# Patient Record
Sex: FEMALE | Race: WHITE | NOT HISPANIC OR LATINO | Employment: FULL TIME | URBAN - METROPOLITAN AREA
[De-identification: names, ages, dates, MRNs, and addresses within clinical notes are randomized per-mention and may not be internally consistent; named-entity substitution may affect disease eponyms.]

---

## 2017-04-19 ENCOUNTER — HOSPITAL ENCOUNTER (OUTPATIENT)
Dept: URGENT CARE | Facility: CLINIC | Age: 57
Discharge: HOME | End: 2017-04-19
Attending: FAMILY MEDICINE | Admitting: FAMILY MEDICINE

## 2018-08-20 ENCOUNTER — HOSPITAL ENCOUNTER (OUTPATIENT)
Dept: FAMILY MEDICINE CLINIC | Facility: CLINIC | Age: 58
Setting detail: SPECIMEN
Discharge: HOME | End: 2018-08-20
Attending: FAMILY MEDICINE | Admitting: FAMILY MEDICINE

## 2018-08-20 LAB
ALBUMIN SERPL-MCNC: 4.2 G/DL (ref 3.5–4.8)
ALBUMIN/GLOB SERPL: 1.3 {RATIO} (ref 1–1.7)
ALP SERPL-CCNC: 62 IU/L (ref 32–91)
ALT SERPL-CCNC: 22 IU/L (ref 14–54)
ANION GAP SERPL CALC-SCNC: 13.9 MMOL/L (ref 10–20)
AST SERPL-CCNC: 25 IU/L (ref 15–41)
BASOPHILS # BLD AUTO: 0.1 10*3/UL (ref 0–0.2)
BASOPHILS NFR BLD AUTO: 1 % (ref 0–2)
BILIRUB SERPL-MCNC: 0.8 MG/DL (ref 0.3–1.2)
BUN SERPL-MCNC: 16 MG/DL (ref 8–20)
BUN/CREAT SERPL: 16 (ref 5.4–26.2)
CALCIUM SERPL-MCNC: 9.8 MG/DL (ref 8.9–10.3)
CHLORIDE SERPL-SCNC: 102 MMOL/L (ref 101–111)
CHOLEST SERPL-MCNC: 222 MG/DL
CHOLEST/HDLC SERPL: 3 {RATIO}
CHROMATIN AB SERPL-ACNC: <20 [IU]/ML (ref 0–20)
CONV CO2: 27 MMOL/L (ref 22–32)
CONV LDL CHOLESTEROL DIRECT: 129 MG/DL (ref 0–100)
CONV TOTAL PROTEIN: 7.4 G/DL (ref 6.1–7.9)
CREAT UR-MCNC: 1 MG/DL (ref 0.4–1)
DIFFERENTIAL METHOD BLD: (no result)
EOSINOPHIL # BLD AUTO: 0.2 10*3/UL (ref 0–0.3)
EOSINOPHIL # BLD AUTO: 3 % (ref 0–3)
ERYTHROCYTE [DISTWIDTH] IN BLOOD BY AUTOMATED COUNT: 13.8 % (ref 11.5–14.5)
GLOBULIN UR ELPH-MCNC: 3.2 G/DL (ref 2.5–3.8)
GLUCOSE SERPL-MCNC: 96 MG/DL (ref 65–99)
HCT VFR BLD AUTO: 45.4 % (ref 35–49)
HDLC SERPL-MCNC: 75 MG/DL
HGB BLD-MCNC: 15 G/DL (ref 12–15)
LDLC/HDLC SERPL: 1.7 {RATIO}
LIPID INTERPRETATION: ABNORMAL
LYMPHOCYTES # BLD AUTO: 1.7 10*3/UL (ref 0.8–4.8)
LYMPHOCYTES NFR BLD AUTO: 34 % (ref 18–42)
MCH RBC QN AUTO: 28.6 PG (ref 26–32)
MCHC RBC AUTO-ENTMCNC: 32.9 G/DL (ref 32–36)
MCV RBC AUTO: 86.8 FL (ref 80–94)
MONOCYTES # BLD AUTO: 0.5 10*3/UL (ref 0.1–1.3)
MONOCYTES NFR BLD AUTO: 9 % (ref 2–11)
NEUTROPHILS # BLD AUTO: 2.6 10*3/UL (ref 2.3–8.6)
NEUTROPHILS NFR BLD AUTO: 53 % (ref 50–75)
NRBC BLD AUTO-RTO: 0 /100{WBCS}
NRBC/RBC NFR BLD MANUAL: 0 10*3/UL
PLATELET # BLD AUTO: 361 10*3/UL (ref 150–450)
PMV BLD AUTO: 7.4 FL (ref 7.4–10.4)
POTASSIUM SERPL-SCNC: 3.9 MMOL/L (ref 3.6–5.1)
RBC # BLD AUTO: 5.23 10*6/UL (ref 4–5.4)
SODIUM SERPL-SCNC: 139 MMOL/L (ref 136–144)
TRIGL SERPL-MCNC: 62 MG/DL
VLDLC SERPL CALC-MCNC: 19 MG/DL
WBC # BLD AUTO: 5 10*3/UL (ref 4.5–11.5)

## 2018-08-22 LAB
ANA SER QL IA: ABNORMAL

## 2018-08-28 LAB
ENA RNP AB SER-ACNC: <1 AI
ENA SCL70 AB SER QL IA: <1 AI
ENA SM AB SER-ACNC: <1 AI
ENA SS-B AB SER-ACNC: <1 AI
RIBOSOMAL P AB SER-ACNC: <1 AI
RIBOSOMAL P AB SER-ACNC: <1 AI
SJOGREN'S ANTI-SS-A: <1 AI

## 2018-11-23 ENCOUNTER — HOSPITAL ENCOUNTER (OUTPATIENT)
Dept: NEUROLOGY | Facility: HOSPITAL | Age: 58
Discharge: HOME | End: 2018-11-23
Attending: INTERNAL MEDICINE | Admitting: INTERNAL MEDICINE

## 2018-11-23 PROCEDURE — 95910 NRV CNDJ TEST 7-8 STUDIES: CPT | Performed by: PSYCHIATRY & NEUROLOGY

## 2018-11-23 PROCEDURE — 95886 MUSC TEST DONE W/N TEST COMP: CPT | Performed by: PSYCHIATRY & NEUROLOGY

## 2018-12-07 ENCOUNTER — OUTSIDE FACILITY SERVICE (OUTPATIENT)
Dept: NEUROLOGY | Facility: CLINIC | Age: 58
End: 2018-12-07

## 2019-10-03 ENCOUNTER — TELEPHONE (OUTPATIENT)
Dept: FAMILY MEDICINE CLINIC | Facility: CLINIC | Age: 59
End: 2019-10-03

## 2019-10-03 ENCOUNTER — LAB (OUTPATIENT)
Dept: FAMILY MEDICINE CLINIC | Facility: CLINIC | Age: 59
End: 2019-10-03

## 2019-10-03 DIAGNOSIS — Z00.00 ENCOUNTER FOR GENERAL ADULT MEDICAL EXAMINATION WITHOUT ABNORMAL FINDINGS: Primary | ICD-10-CM

## 2019-10-03 DIAGNOSIS — H04.123 DRY EYE SYNDROME OF BOTH EYES: ICD-10-CM

## 2019-10-03 DIAGNOSIS — H04.123 DRY EYE SYNDROME OF BOTH EYES: Primary | ICD-10-CM

## 2019-10-03 PROBLEM — M19.90 ARTHRITIS: Status: ACTIVE | Noted: 2018-08-13

## 2019-10-03 PROBLEM — IMO0002 ABNORMAL PAP SMEAR: Status: ACTIVE | Noted: 2019-10-03

## 2019-10-03 PROBLEM — D68.61 ANTIPHOSPHOLIPID SYNDROME: Status: ACTIVE | Noted: 2018-08-27

## 2019-10-03 PROBLEM — M54.2 NECK PAIN, ACUTE: Status: ACTIVE | Noted: 2018-08-08

## 2019-10-03 PROBLEM — R87.619 ABNORMAL PAP SMEAR: Status: ACTIVE | Noted: 2019-10-03

## 2019-10-03 PROBLEM — K80.20 CHOLELITHIASIS: Status: ACTIVE | Noted: 2017-04-21

## 2019-10-03 PROBLEM — Z13.820 SCREENING FOR OSTEOPOROSIS: Status: ACTIVE | Noted: 2017-04-21

## 2019-10-03 LAB
25(OH)D3 SERPL-MCNC: 31.2 NG/ML (ref 30–100)
ALBUMIN SERPL-MCNC: 3.9 G/DL (ref 3.5–4.8)
ALBUMIN/GLOB SERPL: 1.4 G/DL (ref 1–1.7)
ALP SERPL-CCNC: 58 U/L (ref 32–91)
ALT SERPL W P-5'-P-CCNC: 23 U/L (ref 14–54)
ANION GAP SERPL CALCULATED.3IONS-SCNC: 12.7 MMOL/L (ref 5–15)
ARTICHOKE IGE QN: 157 MG/DL (ref 0–100)
AST SERPL-CCNC: 23 U/L (ref 15–41)
BACTERIA UR QL AUTO: ABNORMAL /HPF
BASOPHILS # BLD AUTO: 0.1 10*3/MM3 (ref 0–0.2)
BASOPHILS NFR BLD AUTO: 1 % (ref 0–1.5)
BILIRUB SERPL-MCNC: 0.5 MG/DL (ref 0.3–1.2)
BILIRUB UR QL STRIP: NEGATIVE
BUN BLD-MCNC: 20 MG/DL (ref 8–20)
BUN/CREAT SERPL: 20 (ref 5.4–26.2)
CALCIUM SPEC-SCNC: 9.6 MG/DL (ref 8.9–10.3)
CHLORIDE SERPL-SCNC: 107 MMOL/L (ref 101–111)
CHOLEST SERPL-MCNC: 218 MG/DL
CLARITY UR: CLEAR
CO2 SERPL-SCNC: 26 MMOL/L (ref 22–32)
COLOR UR: YELLOW
CREAT BLD-MCNC: 1 MG/DL (ref 0.4–1)
DEPRECATED RDW RBC AUTO: 44.6 FL (ref 37–54)
EOSINOPHIL # BLD AUTO: 0.1 10*3/MM3 (ref 0–0.4)
EOSINOPHIL NFR BLD AUTO: 2.5 % (ref 0.3–6.2)
ERYTHROCYTE [DISTWIDTH] IN BLOOD BY AUTOMATED COUNT: 14.6 % (ref 12.3–15.4)
GFR SERPL CREATININE-BSD FRML MDRD: 57 ML/MIN/1.73
GLOBULIN UR ELPH-MCNC: 2.7 GM/DL (ref 2.5–3.8)
GLUCOSE BLD-MCNC: 100 MG/DL (ref 65–99)
GLUCOSE UR STRIP-MCNC: NEGATIVE MG/DL
HCT VFR BLD AUTO: 41.9 % (ref 34–46.6)
HDLC SERPL QL: 3.3
HDLC SERPL-MCNC: 66 MG/DL
HGB BLD-MCNC: 13.8 G/DL (ref 12–15.9)
HGB UR QL STRIP.AUTO: NEGATIVE
HYALINE CASTS UR QL AUTO: ABNORMAL /LPF
KETONES UR QL STRIP: NEGATIVE
LDLC/HDLC SERPL: 2.11 {RATIO}
LEUKOCYTE ESTERASE UR QL STRIP.AUTO: ABNORMAL
LYMPHOCYTES # BLD AUTO: 1.7 10*3/MM3 (ref 0.7–3.1)
LYMPHOCYTES NFR BLD AUTO: 33.5 % (ref 19.6–45.3)
MCH RBC QN AUTO: 28.7 PG (ref 26.6–33)
MCHC RBC AUTO-ENTMCNC: 33 G/DL (ref 31.5–35.7)
MCV RBC AUTO: 87 FL (ref 79–97)
MONOCYTES # BLD AUTO: 0.4 10*3/MM3 (ref 0.1–0.9)
MONOCYTES NFR BLD AUTO: 8.1 % (ref 5–12)
NEUTROPHILS # BLD AUTO: 2.8 10*3/MM3 (ref 1.7–7)
NEUTROPHILS NFR BLD AUTO: 54.9 % (ref 42.7–76)
NITRITE UR QL STRIP: NEGATIVE
NRBC BLD AUTO-RTO: 0.1 /100 WBC (ref 0–0.2)
PH UR STRIP.AUTO: 5.5 [PH] (ref 5–8)
PLATELET # BLD AUTO: 348 10*3/MM3 (ref 140–450)
PMV BLD AUTO: 7.1 FL (ref 6–12)
POTASSIUM BLD-SCNC: 4.7 MMOL/L (ref 3.6–5.1)
PROT SERPL-MCNC: 6.6 G/DL (ref 6.1–7.9)
PROT UR QL STRIP: NEGATIVE
RBC # BLD AUTO: 4.82 10*6/MM3 (ref 3.77–5.28)
RBC # UR: ABNORMAL /HPF
REF LAB TEST METHOD: ABNORMAL
SODIUM BLD-SCNC: 141 MMOL/L (ref 136–144)
SP GR UR STRIP: 1.02 (ref 1–1.03)
SQUAMOUS #/AREA URNS HPF: ABNORMAL /HPF
T3 SERPL-MCNC: 0.9 NG/DL (ref 0.9–1.8)
T4 FREE SERPL-MCNC: 1.01 NG/DL (ref 0.58–1.64)
TRIGL SERPL-MCNC: 64 MG/DL
TSH SERPL DL<=0.05 MIU/L-ACNC: 2.01 UIU/ML (ref 0.34–5.6)
UROBILINOGEN UR QL STRIP: ABNORMAL
VIT B12 BLD-MCNC: 599 PG/ML (ref 180–914)
VLDLC SERPL-MCNC: 12.8 MG/DL
WBC NRBC COR # BLD: 5.1 10*3/MM3 (ref 3.4–10.8)
WBC UR QL AUTO: ABNORMAL /HPF

## 2019-10-03 PROCEDURE — 85025 COMPLETE CBC W/AUTO DIFF WBC: CPT | Performed by: FAMILY MEDICINE

## 2019-10-03 PROCEDURE — 84480 ASSAY TRIIODOTHYRONINE (T3): CPT | Performed by: FAMILY MEDICINE

## 2019-10-03 PROCEDURE — 84439 ASSAY OF FREE THYROXINE: CPT | Performed by: FAMILY MEDICINE

## 2019-10-03 PROCEDURE — 36415 COLL VENOUS BLD VENIPUNCTURE: CPT | Performed by: FAMILY MEDICINE

## 2019-10-03 PROCEDURE — 82607 VITAMIN B-12: CPT | Performed by: FAMILY MEDICINE

## 2019-10-03 PROCEDURE — 82306 VITAMIN D 25 HYDROXY: CPT | Performed by: FAMILY MEDICINE

## 2019-10-03 PROCEDURE — 86376 MICROSOMAL ANTIBODY EACH: CPT | Performed by: FAMILY MEDICINE

## 2019-10-03 PROCEDURE — 80053 COMPREHEN METABOLIC PANEL: CPT | Performed by: FAMILY MEDICINE

## 2019-10-03 PROCEDURE — 80061 LIPID PANEL: CPT | Performed by: FAMILY MEDICINE

## 2019-10-03 PROCEDURE — 81001 URINALYSIS AUTO W/SCOPE: CPT | Performed by: FAMILY MEDICINE

## 2019-10-03 PROCEDURE — 84443 ASSAY THYROID STIM HORMONE: CPT | Performed by: FAMILY MEDICINE

## 2019-10-04 LAB — THYROID PEROXIDASE: <1 IU/ML (ref 0–9)

## 2019-10-30 RX ORDER — CYCLOBENZAPRINE HCL 5 MG
TABLET ORAL
Qty: 30 TABLET | Refills: 0 | OUTPATIENT
Start: 2019-10-30

## 2020-02-14 ENCOUNTER — OFFICE VISIT (OUTPATIENT)
Dept: FAMILY MEDICINE CLINIC | Facility: CLINIC | Age: 60
End: 2020-02-14

## 2020-02-14 VITALS
RESPIRATION RATE: 16 BRPM | WEIGHT: 140 LBS | SYSTOLIC BLOOD PRESSURE: 100 MMHG | TEMPERATURE: 98.5 F | HEART RATE: 108 BPM | DIASTOLIC BLOOD PRESSURE: 51 MMHG | OXYGEN SATURATION: 97 %

## 2020-02-14 DIAGNOSIS — J01.00 SUBACUTE MAXILLARY SINUSITIS: Primary | ICD-10-CM

## 2020-02-14 PROCEDURE — 99213 OFFICE O/P EST LOW 20 MIN: CPT | Performed by: FAMILY MEDICINE

## 2020-02-14 RX ORDER — ACETAMINOPHEN 160 MG
1 TABLET,DISINTEGRATING ORAL EVERY 24 HOURS
COMMUNITY
Start: 2018-08-27 | End: 2021-07-14

## 2020-02-14 RX ORDER — BENZONATATE 200 MG/1
200 CAPSULE ORAL 3 TIMES DAILY PRN
Qty: 30 CAPSULE | Refills: 0 | Status: SHIPPED | OUTPATIENT
Start: 2020-02-14 | End: 2020-02-24

## 2020-02-14 RX ORDER — AZITHROMYCIN 250 MG/1
250 TABLET, FILM COATED ORAL DAILY
Qty: 6 TABLET | Refills: 0 | Status: SHIPPED | OUTPATIENT
Start: 2020-02-14 | End: 2020-02-19

## 2020-02-14 NOTE — PROGRESS NOTES
Subjective   Chief Complaint   Patient presents with   • Cough   • YAMILE Paulino is a 60 y.o. female.     Patient Care Team:  Analy San MD as PCP - General  Magalis Galindo MD as Consulting Physician (Obstetrics and Gynecology)    She is coming in today due to upper respiratory symptoms.  She reports being sick for about 2 weeks with cough and congestion.  She has a lot of drainage in the back of her throat and at times she coughs up colorful sputum.  She also has a lot of pressure in her sinuses.  She however denies any difficulty breathing or chest discomfort.  She did not get a flu shot this season.  She denies any nausea, vomiting, or diarrhea.       The following portions of the patient's history were reviewed and updated as appropriate: allergies, current medications, past family history, past medical history, past social history, past surgical history and problem list.  Past Medical History:   Diagnosis Date   • Arthritis    • Cholelithiasis    • Hearing loss in right ear     Possible Menier's disease. Dr. Orr   • Neck pain, acute    • Pituitary mass (CMS/HCC) 2006    [facility] in [facility]; she gets MRI's every 2 years   • Positive VEENA (antinuclear antibody)      Past Surgical History:   Procedure Laterality Date   • COLONOSCOPY  2014    negative- 10 yr repeat   • CYST REMOVAL Right     wrist   • DILATATION AND CURETTAGE     • OTHER SURGICAL HISTORY      Laparoscopy   • VAGINAL DELIVERY       -  X3   • WISDOM TOOTH EXTRACTION       The patient has a family history of  Family History   Problem Relation Age of Onset   • Hypertension Mother    • Lung cancer Father    • Other Other         FH OF RA- SEVERAL FAMILY MEMBERS     Social History     Socioeconomic History   • Marital status:      Spouse name: Not on file   • Number of children: Not on file   • Years of education: Not on file   • Highest education level: Not on file   Tobacco Use   • Smoking status: Never Smoker   •  Smokeless tobacco: Never Used   Substance and Sexual Activity   • Alcohol use: Yes   • Drug use: Never   • Sexual activity: Yes       Review of Systems   Constitutional: Negative for activity change, appetite change, chills and fever.   HENT: Positive for congestion, postnasal drip, rhinorrhea and sinus pressure. Negative for ear pain, sore throat and swollen glands.    Respiratory: Positive for cough. Negative for choking, chest tightness, shortness of breath, wheezing and stridor.    Cardiovascular: Negative for chest pain.   Skin: Negative for dry skin and rash.     Visit Vitals  /51 (BP Location: Left arm, Patient Position: Sitting, Cuff Size: Adult)   Pulse 108   Temp 98.5 °F (36.9 °C) (Oral)   Resp 16   Wt 63.5 kg (140 lb)   SpO2 97%       Current Outpatient Medications:   •  Cholecalciferol (VITAMIN D3) 50 MCG (2000 UT) capsule, 1 capsule Daily., Disp: , Rfl:   •  azithromycin (ZITHROMAX) 250 MG tablet, Take 1 tablet by mouth Daily for 5 days. Take 2 tablets the first day, then 1 tablet daily for 4 days., Disp: 6 tablet, Rfl: 0  •  benzonatate (TESSALON) 200 MG capsule, Take 1 capsule by mouth 3 (Three) Times a Day As Needed for Cough for up to 10 days., Disp: 30 capsule, Rfl: 0    Objective   Physical Exam   Constitutional: She appears well-developed and well-nourished. No distress.   HENT:   Head: Normocephalic and atraumatic.   Right Ear: External ear normal.   Left Ear: External ear normal.   Mouth/Throat: Oropharynx is clear and moist. No oropharyngeal exudate.   Palpation tenderness to both maxillary sinuses noted. Congestion noted.   Eyes: Pupils are equal, round, and reactive to light. Conjunctivae and EOM are normal.   Neck: Normal range of motion. Neck supple.   Cardiovascular: Normal rate, regular rhythm, normal heart sounds and intact distal pulses.   Pulmonary/Chest: Effort normal and breath sounds normal. No respiratory distress. She has no wheezes. She has no rales.   Skin: Skin is warm  and dry. No rash noted.   Nursing note and vitals reviewed.           Abstract on 02/14/2020   Component Date Value Ref Range Status   • HM Colonoscopy 06/09/2014 SEE REPORT   Final                 Assessment/Plan   Problems Addressed this Visit        Respiratory    Subacute maxillary sinusitis - Primary        I will start her on Z-Oz and I also gave her some cough medicine.  Symptomatic treatment was discussed.  She was advised to monitor her symptoms and call us back if no improvement.             Requested Prescriptions     Signed Prescriptions Disp Refills   • azithromycin (ZITHROMAX) 250 MG tablet 6 tablet 0     Sig: Take 1 tablet by mouth Daily for 5 days. Take 2 tablets the first day, then 1 tablet daily for 4 days.   • benzonatate (TESSALON) 200 MG capsule 30 capsule 0     Sig: Take 1 capsule by mouth 3 (Three) Times a Day As Needed for Cough for up to 10 days.

## 2021-04-15 ENCOUNTER — LAB (OUTPATIENT)
Dept: LAB | Facility: HOSPITAL | Age: 61
End: 2021-04-15

## 2021-04-15 ENCOUNTER — TRANSCRIBE ORDERS (OUTPATIENT)
Dept: ADMINISTRATIVE | Facility: HOSPITAL | Age: 61
End: 2021-04-15

## 2021-04-15 DIAGNOSIS — M51.37 DEGENERATION OF LUMBAR OR LUMBOSACRAL INTERVERTEBRAL DISC: ICD-10-CM

## 2021-04-15 DIAGNOSIS — M06.09 RHEUMATOID ARTHRITIS OF MULTIPLE SITES WITHOUT RHEUMATOID FACTOR (HCC): ICD-10-CM

## 2021-04-15 DIAGNOSIS — M06.09 RHEUMATOID ARTHRITIS OF MULTIPLE SITES WITHOUT RHEUMATOID FACTOR (HCC): Primary | ICD-10-CM

## 2021-04-15 LAB
ALBUMIN SERPL-MCNC: 4.3 G/DL (ref 3.5–5.2)
ALBUMIN/GLOB SERPL: 1.9 G/DL
ALP SERPL-CCNC: 65 U/L (ref 39–117)
ALT SERPL W P-5'-P-CCNC: 23 U/L (ref 1–33)
ANION GAP SERPL CALCULATED.3IONS-SCNC: 8.2 MMOL/L (ref 5–15)
AST SERPL-CCNC: 21 U/L (ref 1–32)
BASOPHILS # BLD AUTO: 0.08 10*3/MM3 (ref 0–0.2)
BASOPHILS NFR BLD AUTO: 1.4 % (ref 0–1.5)
BILIRUB SERPL-MCNC: 0.3 MG/DL (ref 0–1.2)
BUN SERPL-MCNC: 14 MG/DL (ref 8–23)
BUN/CREAT SERPL: 16.9 (ref 7–25)
CALCIUM SPEC-SCNC: 9.7 MG/DL (ref 8.6–10.5)
CHLORIDE SERPL-SCNC: 103 MMOL/L (ref 98–107)
CO2 SERPL-SCNC: 27.8 MMOL/L (ref 22–29)
CREAT SERPL-MCNC: 0.83 MG/DL (ref 0.57–1)
CRP SERPL-MCNC: <0.3 MG/DL (ref 0–0.5)
DEPRECATED RDW RBC AUTO: 45 FL (ref 37–54)
EOSINOPHIL # BLD AUTO: 0.12 10*3/MM3 (ref 0–0.4)
EOSINOPHIL NFR BLD AUTO: 2.1 % (ref 0.3–6.2)
ERYTHROCYTE [DISTWIDTH] IN BLOOD BY AUTOMATED COUNT: 15.3 % (ref 12.3–15.4)
GFR SERPL CREATININE-BSD FRML MDRD: 70 ML/MIN/1.73
GLOBULIN UR ELPH-MCNC: 2.3 GM/DL
GLUCOSE SERPL-MCNC: 86 MG/DL (ref 65–99)
HCT VFR BLD AUTO: 38.5 % (ref 34–46.6)
HGB BLD-MCNC: 12.3 G/DL (ref 12–15.9)
IMM GRANULOCYTES # BLD AUTO: 0.02 10*3/MM3 (ref 0–0.05)
IMM GRANULOCYTES NFR BLD AUTO: 0.3 % (ref 0–0.5)
LYMPHOCYTES # BLD AUTO: 2.12 10*3/MM3 (ref 0.7–3.1)
LYMPHOCYTES NFR BLD AUTO: 36.3 % (ref 19.6–45.3)
MCH RBC QN AUTO: 26.6 PG (ref 26.6–33)
MCHC RBC AUTO-ENTMCNC: 31.9 G/DL (ref 31.5–35.7)
MCV RBC AUTO: 83.3 FL (ref 79–97)
MONOCYTES # BLD AUTO: 0.58 10*3/MM3 (ref 0.1–0.9)
MONOCYTES NFR BLD AUTO: 9.9 % (ref 5–12)
NEUTROPHILS NFR BLD AUTO: 2.92 10*3/MM3 (ref 1.7–7)
NEUTROPHILS NFR BLD AUTO: 50 % (ref 42.7–76)
NRBC BLD AUTO-RTO: 0 /100 WBC (ref 0–0.2)
PLATELET # BLD AUTO: 384 10*3/MM3 (ref 140–450)
PMV BLD AUTO: 9.1 FL (ref 6–12)
POTASSIUM SERPL-SCNC: 4 MMOL/L (ref 3.5–5.2)
PROT SERPL-MCNC: 6.6 G/DL (ref 6–8.5)
RBC # BLD AUTO: 4.62 10*6/MM3 (ref 3.77–5.28)
SODIUM SERPL-SCNC: 139 MMOL/L (ref 136–145)
WBC # BLD AUTO: 5.84 10*3/MM3 (ref 3.4–10.8)

## 2021-04-15 PROCEDURE — 85025 COMPLETE CBC W/AUTO DIFF WBC: CPT

## 2021-04-15 PROCEDURE — 80053 COMPREHEN METABOLIC PANEL: CPT

## 2021-04-15 PROCEDURE — 86140 C-REACTIVE PROTEIN: CPT

## 2021-04-15 PROCEDURE — 36415 COLL VENOUS BLD VENIPUNCTURE: CPT

## 2021-06-01 ENCOUNTER — TELEPHONE (OUTPATIENT)
Dept: FAMILY MEDICINE CLINIC | Facility: CLINIC | Age: 61
End: 2021-06-01
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
## 2021-06-02 NOTE — TELEPHONE ENCOUNTER
Please advise the patient that I am out of the office this week on vacation. She needs to go to the urgent care for evaluation. Thank you.

## 2021-06-02 NOTE — TELEPHONE ENCOUNTER
Caller: Ela Paulino    Relationship to patient: Self    Best call back number: [phone]    Patient is needing: PATIENT CALLING BACK TO FOLLOW UP ON XRAY ORDERS. PLEASE INFORM PATIENT WHEN THE ORDER IS IN.

## 2021-06-04 ENCOUNTER — OFFICE VISIT (OUTPATIENT)
Dept: ORTHOPEDIC SURGERY | Facility: CLINIC | Age: 61
End: 2021-06-04

## 2021-06-04 VITALS
BODY MASS INDEX: 24.84 KG/M2 | DIASTOLIC BLOOD PRESSURE: 73 MMHG | SYSTOLIC BLOOD PRESSURE: 110 MMHG | WEIGHT: 135 LBS | HEART RATE: 80 BPM | HEIGHT: 62 IN

## 2021-06-04 DIAGNOSIS — S52.531A CLOSED COLLES' FRACTURE OF RIGHT RADIUS, INITIAL ENCOUNTER: Primary | ICD-10-CM

## 2021-06-04 PROCEDURE — 99203 OFFICE O/P NEW LOW 30 MIN: CPT | Performed by: FAMILY MEDICINE
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
## 2021-07-02 ENCOUNTER — OFFICE VISIT (OUTPATIENT)
Dept: ORTHOPEDIC SURGERY | Facility: CLINIC | Age: 61
End: 2021-07-02

## 2021-07-02 VITALS
SYSTOLIC BLOOD PRESSURE: 110 MMHG | HEIGHT: 62 IN | BODY MASS INDEX: 24.84 KG/M2 | HEART RATE: 68 BPM | WEIGHT: 135 LBS | DIASTOLIC BLOOD PRESSURE: 72 MMHG

## 2021-07-02 DIAGNOSIS — M19.031 LOCALIZED PRIMARY OSTEOARTHRITIS OF CARPOMETACARPAL (CMC) JOINT OF RIGHT WRIST: Primary | ICD-10-CM

## 2021-07-02 DIAGNOSIS — S52.531S CLOSED COLLES' FRACTURE OF RIGHT RADIUS, SEQUELA: ICD-10-CM

## 2021-07-02 PROCEDURE — 99213 OFFICE O/P EST LOW 20 MIN: CPT | Performed by: FAMILY MEDICINE

## 2021-07-02 NOTE — PROGRESS NOTES
Primary Care Sports Medicine Office Visit Note     Patient ID: Ela Paulino is a 61 y.o. female.    Chief Complaint:  Chief Complaint   Patient presents with   • Right Wrist - Follow-up     HPI:    Ms. Ela Paulino is a 61 y.o. female who presents to the clinic today for follow-up of questionable Colles' fracture of the right distal radius.  She previously was seen 4 weeks ago, and there was concern for fracture, questionable on imaging.  The patient since that time has worn fracture brace as instructed.  Today, she states she is completely asymptomatic.  She did not have much pain or problem with wrist for near 1 week.  Since that time, for the last 3 weeks has had no problems.  She denies any pain or tenderness to the wrist today.  She otherwise seems to be doing well without complaint or problem today.    Past Medical History:   Diagnosis Date   • Arthritis    • Cholelithiasis    • Hearing loss in right ear     Possible Menier's disease. Dr. Orr   • Neck pain, acute    • Pituitary mass (CMS/HCC) 2006    [facility]; she gets MRI's every 2 years   • Positive VEENA (antinuclear antibody)        Past Surgical History:   Procedure Laterality Date   • COLONOSCOPY  2014    negative- 10 yr repeat   • CYST REMOVAL Right     wrist   • DILATATION AND CURETTAGE     • OTHER SURGICAL HISTORY      Laparoscopy   • VAGINAL DELIVERY       -  X3   • WISDOM TOOTH EXTRACTION         Family History   Problem Relation Age of Onset   • Hypertension Mother    • Lung cancer Father    • Other Other         FH OF RA- SEVERAL FAMILY MEMBERS     Social History     Occupational History   • Not on file   Tobacco Use   • Smoking status: Never Smoker   • Smokeless tobacco: Never Used   Vaping Use   • Vaping Use: Never used   Substance and Sexual Activity   • Alcohol use: Yes   • Drug use: Never   • Sexual activity: Yes      Review of Systems   Constitutional: Negative for activity change and fever.   Musculoskeletal:  "Negative for arthralgias.   Skin: Negative for color change and rash.   Neurological: Negative for weakness.       Objective:    /72   Pulse 68   Ht 157.5 cm (62\")   Wt 61.2 kg (135 lb)   BMI 24.69 kg/m²     Physical Examination:  Physical Exam  Vitals and nursing note reviewed.   Constitutional:       General: She is not in acute distress.     Appearance: She is well-developed. She is not diaphoretic.   HENT:      Head: Normocephalic and atraumatic.   Eyes:      Conjunctiva/sclera: Conjunctivae normal.   Pulmonary:      Effort: Pulmonary effort is normal. No respiratory distress.   Skin:     General: Skin is warm.      Capillary Refill: Capillary refill takes less than 2 seconds.   Neurological:      Mental Status: She is alert.       Right Hand Exam     Tenderness   Right hand tenderness location: CMC joint.    Range of Motion   Wrist   Extension: normal   Flexion: normal   Pronation: normal   Supination: normal     Muscle Strength   Wrist extension: 5/5   Wrist flexion: 5/5   : 5/5     Other   Erythema: absent  Sensation: normal  Pulse: present        Imaging and other tests:  Three-view x-ray of the right wrist yields no acute bony pathology.    Assessment and Plan:    1. Localized primary osteoarthritis of carpometacarpal (CMC) joint of right wrist    I discussed pathology, XR findings, and treatment options.  I do not feel this patient sustained a fracture, as we are concerned for occult fracture or previous evaluation.  She can discontinue fracture brace today.  Patient verbalized understanding and agrees with treatment plan.  She was given Rx alternatives topical pain cream.  RTC as needed.    Maicol WALKER \"Chance\" Ranulfo VALDEZ DO CAQSM  07/12/21  16:12 EDT    Disclaimer: Please note that areas of this note were completed with computer voice recognition software.  Quite often unanticipated grammatical, syntax, homophones, and other interpretive errors are inadvertently transcribed by the computer " software. Please excuse any errors that have escaped final proofreading.

## 2021-07-06 ENCOUNTER — TELEPHONE (OUTPATIENT)
Dept: FAMILY MEDICINE CLINIC | Facility: CLINIC | Age: 61
End: 2021-07-06

## 2021-07-14 ENCOUNTER — LAB (OUTPATIENT)
Dept: FAMILY MEDICINE CLINIC | Facility: CLINIC | Age: 61
End: 2021-07-14

## 2021-07-14 ENCOUNTER — OFFICE VISIT (OUTPATIENT)
Dept: FAMILY MEDICINE CLINIC | Facility: CLINIC | Age: 61
End: 2021-07-14

## 2021-07-14 VITALS
OXYGEN SATURATION: 97 % | DIASTOLIC BLOOD PRESSURE: 72 MMHG | RESPIRATION RATE: 16 BRPM | SYSTOLIC BLOOD PRESSURE: 115 MMHG | BODY MASS INDEX: 25.21 KG/M2 | TEMPERATURE: 97.3 F | HEIGHT: 62 IN | WEIGHT: 137 LBS | HEART RATE: 70 BPM

## 2021-07-14 DIAGNOSIS — M06.00 SERONEGATIVE RHEUMATOID ARTHRITIS (HCC): ICD-10-CM

## 2021-07-14 DIAGNOSIS — M06.00 SERONEGATIVE RHEUMATOID ARTHRITIS (HCC): Primary | ICD-10-CM

## 2021-07-14 PROBLEM — J01.00 SUBACUTE MAXILLARY SINUSITIS: Status: RESOLVED | Noted: 2020-02-14 | Resolved: 2021-07-14

## 2021-07-14 PROCEDURE — 85652 RBC SED RATE AUTOMATED: CPT | Performed by: FAMILY MEDICINE

## 2021-07-14 PROCEDURE — 86200 CCP ANTIBODY: CPT | Performed by: FAMILY MEDICINE

## 2021-07-14 PROCEDURE — 85025 COMPLETE CBC W/AUTO DIFF WBC: CPT | Performed by: FAMILY MEDICINE

## 2021-07-14 PROCEDURE — 99213 OFFICE O/P EST LOW 20 MIN: CPT | Performed by: FAMILY MEDICINE

## 2021-07-14 PROCEDURE — 36415 COLL VENOUS BLD VENIPUNCTURE: CPT | Performed by: FAMILY MEDICINE

## 2021-07-14 PROCEDURE — 84550 ASSAY OF BLOOD/URIC ACID: CPT | Performed by: FAMILY MEDICINE

## 2021-07-14 PROCEDURE — 86431 RHEUMATOID FACTOR QUANT: CPT | Performed by: FAMILY MEDICINE

## 2021-07-14 PROCEDURE — 80053 COMPREHEN METABOLIC PANEL: CPT | Performed by: FAMILY MEDICINE

## 2021-07-14 PROCEDURE — 86038 ANTINUCLEAR ANTIBODIES: CPT | Performed by: FAMILY MEDICINE

## 2021-07-14 RX ORDER — LEFLUNOMIDE 20 MG/1
20 TABLET ORAL DAILY
COMMUNITY
End: 2021-08-23 | Stop reason: SDUPTHER

## 2021-07-14 RX ORDER — MULTIPLE VITAMINS W/ MINERALS TAB 9MG-400MCG
1 TAB ORAL DAILY
COMMUNITY
End: 2022-09-16

## 2021-07-14 RX ORDER — ACETAMINOPHEN 160 MG
2000 TABLET,DISINTEGRATING ORAL DAILY
COMMUNITY
End: 2021-10-22
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
## 2021-07-15 LAB
ALBUMIN SERPL-MCNC: 4.4 G/DL (ref 3.5–5.2)
ALBUMIN/GLOB SERPL: 1.6 G/DL
ALP SERPL-CCNC: 63 U/L (ref 39–117)
ALT SERPL W P-5'-P-CCNC: 23 U/L (ref 1–33)
ANION GAP SERPL CALCULATED.3IONS-SCNC: 10.1 MMOL/L (ref 5–15)
AST SERPL-CCNC: 12 U/L (ref 1–32)
BASOPHILS # BLD AUTO: 0.1 10*3/MM3 (ref 0–0.2)
BASOPHILS NFR BLD AUTO: 1.7 % (ref 0–1.5)
BILIRUB SERPL-MCNC: 0.4 MG/DL (ref 0–1.2)
BUN SERPL-MCNC: 24 MG/DL (ref 8–23)
BUN/CREAT SERPL: 30 (ref 7–25)
CALCIUM SPEC-SCNC: 9.6 MG/DL (ref 8.6–10.5)
CHLORIDE SERPL-SCNC: 105 MMOL/L (ref 98–107)
CHROMATIN AB SERPL-ACNC: <10 IU/ML (ref 0–14)
CO2 SERPL-SCNC: 24.9 MMOL/L (ref 22–29)
CREAT SERPL-MCNC: 0.8 MG/DL (ref 0.57–1)
DEPRECATED RDW RBC AUTO: 44 FL (ref 37–54)
EOSINOPHIL # BLD AUTO: 0.15 10*3/MM3 (ref 0–0.4)
EOSINOPHIL NFR BLD AUTO: 2.6 % (ref 0.3–6.2)
ERYTHROCYTE [DISTWIDTH] IN BLOOD BY AUTOMATED COUNT: 13.9 % (ref 12.3–15.4)
ERYTHROCYTE [SEDIMENTATION RATE] IN BLOOD: 18 MM/HR (ref 0–30)
GFR SERPL CREATININE-BSD FRML MDRD: 73 ML/MIN/1.73
GLOBULIN UR ELPH-MCNC: 2.8 GM/DL
GLUCOSE SERPL-MCNC: 90 MG/DL (ref 65–99)
HCT VFR BLD AUTO: 43.9 % (ref 34–46.6)
HGB BLD-MCNC: 14.2 G/DL (ref 12–15.9)
IMM GRANULOCYTES # BLD AUTO: 0.02 10*3/MM3 (ref 0–0.05)
IMM GRANULOCYTES NFR BLD AUTO: 0.3 % (ref 0–0.5)
LYMPHOCYTES # BLD AUTO: 1.7 10*3/MM3 (ref 0.7–3.1)
LYMPHOCYTES NFR BLD AUTO: 29 % (ref 19.6–45.3)
MCH RBC QN AUTO: 28.3 PG (ref 26.6–33)
MCHC RBC AUTO-ENTMCNC: 32.3 G/DL (ref 31.5–35.7)
MCV RBC AUTO: 87.5 FL (ref 79–97)
MONOCYTES # BLD AUTO: 0.72 10*3/MM3 (ref 0.1–0.9)
MONOCYTES NFR BLD AUTO: 12.3 % (ref 5–12)
NEUTROPHILS NFR BLD AUTO: 3.17 10*3/MM3 (ref 1.7–7)
NEUTROPHILS NFR BLD AUTO: 54.1 % (ref 42.7–76)
NRBC BLD AUTO-RTO: 0 /100 WBC (ref 0–0.2)
PLATELET # BLD AUTO: 353 10*3/MM3 (ref 140–450)
PMV BLD AUTO: 9.8 FL (ref 6–12)
POTASSIUM SERPL-SCNC: 4.4 MMOL/L (ref 3.5–5.2)
PROT SERPL-MCNC: 7.2 G/DL (ref 6–8.5)
RBC # BLD AUTO: 5.02 10*6/MM3 (ref 3.77–5.28)
SODIUM SERPL-SCNC: 140 MMOL/L (ref 136–145)
URATE SERPL-MCNC: 3 MG/DL (ref 2.4–5.7)
WBC # BLD AUTO: 5.86 10*3/MM3 (ref 3.4–10.8)

## 2021-07-16 LAB
ANA SER QL: NEGATIVE
CCP IGA+IGG SERPL IA-ACNC: 8 UNITS (ref 0–19)

## 2021-07-19 RX ORDER — LEFLUNOMIDE 20 MG/1
TABLET ORAL
Qty: 30 TABLET | Refills: 1 | Status: CANCELLED | OUTPATIENT
Start: 2021-07-19

## 2021-07-19 RX ORDER — LEFLUNOMIDE 20 MG/1
TABLET ORAL
Qty: 30 TABLET | Refills: 0 | Status: SHIPPED | OUTPATIENT
Start: 2021-07-19 | End: 2021-08-23 | Stop reason: SDUPTHER

## 2021-08-23 RX ORDER — LEFLUNOMIDE 20 MG/1
TABLET ORAL
Qty: 30 TABLET | Refills: 0 | Status: SHIPPED | OUTPATIENT
Start: 2021-08-23 | End: 2021-10-22

## 2021-09-21 RX ORDER — LEFLUNOMIDE 20 MG/1
TABLET ORAL
Qty: 30 TABLET | Refills: 0 | OUTPATIENT
Start: 2021-09-21

## 2021-09-27 ENCOUNTER — TELEPHONE (OUTPATIENT)
Dept: FAMILY MEDICINE CLINIC | Facility: CLINIC | Age: 61
End: 2021-09-27

## 2021-09-27 RX ORDER — CYCLOBENZAPRINE HCL 10 MG
10 TABLET ORAL NIGHTLY PRN
Qty: 20 TABLET | Refills: 0 | Status: SHIPPED | OUTPATIENT
Start: 2021-09-27 | End: 2021-10-22

## 2021-09-27 NOTE — TELEPHONE ENCOUNTER
Please call the patient as it looks like she has some questions.  If her pain is that severe and it is a new issue I do recommend for her to make an appointment for evaluation.  Thank you.

## 2021-09-27 NOTE — TELEPHONE ENCOUNTER
Patient says she apologizes it is prednisone 5 mg 3 tablets once a day. She would like a muscle relaxer as well called into her pharmacy [facility].

## 2021-09-27 NOTE — TELEPHONE ENCOUNTER
PT IS REQUESTING A CALL BACK TO DISCUSS A SEVERE PAIN SHE HAS IN HER BACK. BELIEVES SHE PULLED A MUSCLE.

## 2021-09-27 NOTE — TELEPHONE ENCOUNTER
PATIENT STATES SHE PULLED HER LOWER BACK MUSCLE WHEN MOVED BOXES YESTERDAY AT HOME. THE PAIN IS BURNING ON LEFT SIDE OF BACK. SHE IS NAUSEATED. SHE HAS PREDNISONE 5 MG TAKE THREE TABLETS THREE TIMES DAILY FROM HER RHEUMATOLOGIST. CAN SHE TRY THAT? SAYS PAIN IS NOT SEVERE

## 2021-09-27 NOTE — TELEPHONE ENCOUNTER
Is she taking 5 mg tablets, 3 of these 3 times a day?  That sounds like a pretty high dose for the maintenance unless they are tapering her down.  If she is already on this dose of prednisone that should be sufficient for the inflammation in the back.  I can also send a prescription for a muscle relaxant.  However if the pain is not easing up I would like her to make an appointment.  Thank you.

## 2021-10-22 ENCOUNTER — OFFICE VISIT (OUTPATIENT)
Dept: FAMILY MEDICINE CLINIC | Facility: CLINIC | Age: 61
End: 2021-10-22

## 2021-10-22 ENCOUNTER — LAB (OUTPATIENT)
Dept: FAMILY MEDICINE CLINIC | Facility: CLINIC | Age: 61
End: 2021-10-22

## 2021-10-22 VITALS
HEIGHT: 62 IN | OXYGEN SATURATION: 97 % | DIASTOLIC BLOOD PRESSURE: 79 MMHG | RESPIRATION RATE: 16 BRPM | WEIGHT: 137.4 LBS | BODY MASS INDEX: 25.28 KG/M2 | SYSTOLIC BLOOD PRESSURE: 113 MMHG | TEMPERATURE: 97.1 F | HEART RATE: 115 BPM

## 2021-10-22 DIAGNOSIS — R41.3 MEMORY CHANGES: ICD-10-CM

## 2021-10-22 DIAGNOSIS — R00.0 SINUS TACHYCARDIA: ICD-10-CM

## 2021-10-22 DIAGNOSIS — R53.83 OTHER FATIGUE: ICD-10-CM

## 2021-10-22 DIAGNOSIS — R25.1 TREMOR OF BOTH HANDS: Primary | ICD-10-CM

## 2021-10-22 DIAGNOSIS — R25.1 TREMOR OF BOTH HANDS: ICD-10-CM

## 2021-10-22 PROCEDURE — 81003 URINALYSIS AUTO W/O SCOPE: CPT | Performed by: FAMILY MEDICINE

## 2021-10-22 PROCEDURE — 86622 BRUCELLA ANTIBODY: CPT | Performed by: FAMILY MEDICINE

## 2021-10-22 PROCEDURE — 99214 OFFICE O/P EST MOD 30 MIN: CPT | Performed by: FAMILY MEDICINE

## 2021-10-22 PROCEDURE — 84439 ASSAY OF FREE THYROXINE: CPT | Performed by: FAMILY MEDICINE

## 2021-10-22 PROCEDURE — 86757 RICKETTSIA ANTIBODY: CPT | Performed by: FAMILY MEDICINE

## 2021-10-22 PROCEDURE — 86618 LYME DISEASE ANTIBODY: CPT | Performed by: FAMILY MEDICINE

## 2021-10-22 PROCEDURE — 80053 COMPREHEN METABOLIC PANEL: CPT | Performed by: FAMILY MEDICINE

## 2021-10-22 PROCEDURE — 82607 VITAMIN B-12: CPT | Performed by: FAMILY MEDICINE

## 2021-10-22 PROCEDURE — 84443 ASSAY THYROID STIM HORMONE: CPT | Performed by: FAMILY MEDICINE

## 2021-10-22 PROCEDURE — 86666 EHRLICHIA ANTIBODY: CPT | Performed by: FAMILY MEDICINE

## 2021-10-22 PROCEDURE — 93000 ELECTROCARDIOGRAM COMPLETE: CPT | Performed by: FAMILY MEDICINE

## 2021-10-22 PROCEDURE — 36415 COLL VENOUS BLD VENIPUNCTURE: CPT | Performed by: FAMILY MEDICINE

## 2021-10-22 PROCEDURE — 85025 COMPLETE CBC W/AUTO DIFF WBC: CPT | Performed by: FAMILY MEDICINE

## 2021-10-22 NOTE — PROGRESS NOTES
Subjective   Chief Complaint   Patient presents with   • Tremors   • Memory Loss   • Fatigue   • Restless Legs Syndrome     Ela Paulino is a 61 y.o. female.     Patient Care Team:  Analy San MD as PCP - General  Magalis Galindo MD as Consulting Physician (Obstetrics and Gynecology)  Alice Garcia MD as Consulting Physician (Rheumatology)    She is coming in today due to experiencing some new symptoms for the last month.  She tells me that for the last month or so she has been feeling shaky inside of her body.  At times it is her hands and arms and also her legs.  She also has been having this shaky feeling in her chest.  But she denies any rapid heartbeat feeling.  She has been more tired and fatigued and she also noted some memory problems.  She denies any chest pains, shortness of breath, nausea, or vomiting.  She is not on any new medications.  At times her legs almost feel restless, but her symptoms are not only limited to her legs.  No one-sided body weakness reported.       The following portions of the patient's history were reviewed and updated as appropriate: allergies, current medications, past family history, past medical history, past social history, past surgical history and problem list.  Past Medical History:   Diagnosis Date   • Anemia     As a kid   • Arthritis    • Cholelithiasis    • Hearing loss in right ear     Possible Menier's disease. Dr. Orr   • Low back pain    • Neck pain, acute    • Pituitary mass (HCC) 2006    [facility]; she gets MRI's every 2 years   • Positive VEENA (antinuclear antibody)      Past Surgical History:   Procedure Laterality Date   • COLONOSCOPY  2014    negative- 10 yr repeat   • CYST REMOVAL Right     wrist   • DILATATION AND CURETTAGE     • EYE SURGERY     • OTHER SURGICAL HISTORY      Laparoscopy   • VAGINAL DELIVERY       -  X3   • WISDOM TOOTH EXTRACTION       The patient has a family history of  Family History   Problem  "Relation Age of Onset   • Hypertension Mother    • Arthritis Mother    • Lung cancer Father    • Other Other         FH OF RA- SEVERAL FAMILY MEMBERS   • Heart disease Maternal Grandmother    • Mental illness Son         Autism     Social History     Socioeconomic History   • Marital status:    Tobacco Use   • Smoking status: Never Smoker   • Smokeless tobacco: Never Used   Vaping Use   • Vaping Use: Never used   Substance and Sexual Activity   • Alcohol use: Yes     Alcohol/week: 0.0 standard drinks   • Drug use: Never   • Sexual activity: Yes     Partners: Male     Birth control/protection: Post-menopausal       Review of Systems   Constitutional: Positive for fatigue. Negative for activity change and fever.   Respiratory: Negative for shortness of breath and wheezing.    Cardiovascular: Negative for chest pain, palpitations and leg swelling.   Musculoskeletal: Negative for arthralgias and back pain.   Skin: Negative for rash.   Neurological: Positive for tremors and memory problem. Negative for dizziness, weakness and headache.     Visit Vitals  /79 (BP Location: Left arm, Patient Position: Sitting, Cuff Size: Adult)   Pulse 115   Temp 97.1 °F (36.2 °C)   Resp 16   Ht 157.5 cm (62\")   Wt 62.3 kg (137 lb 6.4 oz)   SpO2 97%   BMI 25.13 kg/m²       Current Outpatient Medications:   •  multivitamin with minerals tablet tablet, Take 1 tablet by mouth Daily., Disp: , Rfl:     Objective   Physical Exam  Vitals and nursing note reviewed.   Constitutional:       General: She is not in acute distress.     Appearance: Normal appearance. She is well-developed. She is not ill-appearing.   HENT:      Head: Normocephalic and atraumatic.   Cardiovascular:      Rate and Rhythm: Normal rate and regular rhythm.      Heart sounds: Normal heart sounds. No murmur heard.  No gallop.    Pulmonary:      Effort: Pulmonary effort is normal. No respiratory distress.      Breath sounds: Normal breath sounds. No wheezing, " rhonchi or rales.   Chest:      Chest wall: No tenderness.   Musculoskeletal:      Cervical back: Normal range of motion and neck supple.   Neurological:      General: No focal deficit present.      Mental Status: She is alert and oriented to person, place, and time. Mental status is at baseline.   Psychiatric:         Mood and Affect: Mood normal.         ECG 12 Lead    Date/Time: 10/22/2021 1:48 PM  Performed by: Analy San MD  Authorized by: Analy San MD   Comparison: not compared with previous ECG   Previous ECG: no previous ECG available  Rhythm: sinus rhythm and sinus tachycardia  Rate: tachycardic  Conduction: conduction normal  ST Segments: ST segments normal  T Waves: T waves normal  QRS axis: normal  Other: no other findings    Clinical impression: normal ECG            Assessment/Plan   Diagnoses and all orders for this visit:    1. Tremor of both hands (Primary)  -     CBC Auto Differential  -     Comprehensive Metabolic Panel  -     TSH  -     T4, Free  -     Vitamin B12  -     Urinalysis With Culture If Indicated - Urine, Clean Catch  -     Tick Panel - Blood, Blood, Venous Line; Future    2. Other fatigue  -     CBC Auto Differential  -     Comprehensive Metabolic Panel  -     TSH  -     T4, Free  -     Vitamin B12  -     Urinalysis With Culture If Indicated - Urine, Clean Catch  -     Tick Panel - Blood, Blood, Venous Line; Future    3. Memory changes  -     CBC Auto Differential  -     Comprehensive Metabolic Panel  -     TSH  -     T4, Free  -     Vitamin B12  -     Urinalysis With Culture If Indicated - Urine, Clean Catch  -     Tick Panel - Blood, Blood, Venous Line; Future    4. Sinus tachycardia  -     ECG 12 Lead      I reviewed her symptoms and concerns.  Her exam today shows mild tachycardia.  EKG was done today and it did not show any ischemic changes.  I am not sure about the cause of her symptoms at this point, but she definitely needs further testing and evaluation.  I  will be starting with the blood work in urinalysis  To rule out medical causes.  She possibly might need further evaluation with brain imaging or possibly even need to see the neurologist.  All this was discussed with the patient today.  I will advise further once the results of the blood work are available.            Return if symptoms worsen or fail to improve, for Recheck.    Requested Prescriptions      No prescriptions requested or ordered in this encounter

## 2021-10-23 LAB
ALBUMIN SERPL-MCNC: 4.6 G/DL (ref 3.5–5.2)
ALBUMIN/GLOB SERPL: 1.6 G/DL
ALP SERPL-CCNC: 69 U/L (ref 39–117)
ALT SERPL W P-5'-P-CCNC: 22 U/L (ref 1–33)
ANION GAP SERPL CALCULATED.3IONS-SCNC: 9.5 MMOL/L (ref 5–15)
AST SERPL-CCNC: 21 U/L (ref 1–32)
BASOPHILS # BLD AUTO: 0.07 10*3/MM3 (ref 0–0.2)
BASOPHILS NFR BLD AUTO: 1.4 % (ref 0–1.5)
BILIRUB SERPL-MCNC: 0.3 MG/DL (ref 0–1.2)
BILIRUB UR QL STRIP: NEGATIVE
BUN SERPL-MCNC: 13 MG/DL (ref 8–23)
BUN/CREAT SERPL: 15.9 (ref 7–25)
CALCIUM SPEC-SCNC: 9.7 MG/DL (ref 8.6–10.5)
CHLORIDE SERPL-SCNC: 103 MMOL/L (ref 98–107)
CLARITY UR: CLEAR
CO2 SERPL-SCNC: 27.5 MMOL/L (ref 22–29)
COLOR UR: YELLOW
CREAT SERPL-MCNC: 0.82 MG/DL (ref 0.57–1)
DEPRECATED RDW RBC AUTO: 42.4 FL (ref 37–54)
EOSINOPHIL # BLD AUTO: 0.11 10*3/MM3 (ref 0–0.4)
EOSINOPHIL NFR BLD AUTO: 2.2 % (ref 0.3–6.2)
ERYTHROCYTE [DISTWIDTH] IN BLOOD BY AUTOMATED COUNT: 13.5 % (ref 12.3–15.4)
GFR SERPL CREATININE-BSD FRML MDRD: 71 ML/MIN/1.73
GLOBULIN UR ELPH-MCNC: 2.9 GM/DL
GLUCOSE SERPL-MCNC: 77 MG/DL (ref 65–99)
GLUCOSE UR STRIP-MCNC: NEGATIVE MG/DL
HCT VFR BLD AUTO: 40.4 % (ref 34–46.6)
HGB BLD-MCNC: 13.3 G/DL (ref 12–15.9)
HGB UR QL STRIP.AUTO: NEGATIVE
IMM GRANULOCYTES # BLD AUTO: 0.01 10*3/MM3 (ref 0–0.05)
IMM GRANULOCYTES NFR BLD AUTO: 0.2 % (ref 0–0.5)
KETONES UR QL STRIP: NEGATIVE
LEUKOCYTE ESTERASE UR QL STRIP.AUTO: NEGATIVE
LYMPHOCYTES # BLD AUTO: 1.4 10*3/MM3 (ref 0.7–3.1)
LYMPHOCYTES NFR BLD AUTO: 28.3 % (ref 19.6–45.3)
MCH RBC QN AUTO: 28.6 PG (ref 26.6–33)
MCHC RBC AUTO-ENTMCNC: 32.9 G/DL (ref 31.5–35.7)
MCV RBC AUTO: 86.9 FL (ref 79–97)
MONOCYTES # BLD AUTO: 0.56 10*3/MM3 (ref 0.1–0.9)
MONOCYTES NFR BLD AUTO: 11.3 % (ref 5–12)
NEUTROPHILS NFR BLD AUTO: 2.8 10*3/MM3 (ref 1.7–7)
NEUTROPHILS NFR BLD AUTO: 56.6 % (ref 42.7–76)
NITRITE UR QL STRIP: NEGATIVE
NRBC BLD AUTO-RTO: 0 /100 WBC (ref 0–0.2)
PH UR STRIP.AUTO: 6 [PH] (ref 5–8)
PLATELET # BLD AUTO: 356 10*3/MM3 (ref 140–450)
PMV BLD AUTO: 9.7 FL (ref 6–12)
POTASSIUM SERPL-SCNC: 3.7 MMOL/L (ref 3.5–5.2)
PROT SERPL-MCNC: 7.5 G/DL (ref 6–8.5)
PROT UR QL STRIP: NEGATIVE
RBC # BLD AUTO: 4.65 10*6/MM3 (ref 3.77–5.28)
SODIUM SERPL-SCNC: 140 MMOL/L (ref 136–145)
SP GR UR STRIP: <1.005 (ref 1–1.03)
T4 FREE SERPL-MCNC: 1.18 NG/DL (ref 0.93–1.7)
TSH SERPL DL<=0.05 MIU/L-ACNC: 1 UIU/ML (ref 0.27–4.2)
UROBILINOGEN UR QL STRIP: ABNORMAL
VIT B12 BLD-MCNC: 630 PG/ML (ref 211–946)
WBC # BLD AUTO: 4.95 10*3/MM3 (ref 3.4–10.8)

## 2021-10-25 LAB — B BURGDOR IGG SER QL: NEGATIVE

## 2021-10-26 ENCOUNTER — TELEPHONE (OUTPATIENT)
Dept: FAMILY MEDICINE CLINIC | Facility: CLINIC | Age: 61
End: 2021-10-26

## 2021-10-26 DIAGNOSIS — R00.2 PALPITATIONS: Primary | ICD-10-CM

## 2021-10-26 DIAGNOSIS — R25.1 TREMOR: ICD-10-CM

## 2021-10-26 LAB
A PHAGOCYTOPH IGG TITR SER IF: NEGATIVE {TITER}
A PHAGOCYTOPH IGM TITR SER IF: NEGATIVE {TITER}
BRUCELLA IGG SER QL IA: NEGATIVE
BRUCELLA IGM SER QL IA: NEGATIVE
E CHAFFEENSIS IGG TITR SER IF: NEGATIVE {TITER}
E CHAFFEENSIS IGM TITR SER IF: NEGATIVE {TITER}
RICK SF IGG TITR SER IF: NORMAL {TITER}
RICK SF IGM TITR SER IF: NORMAL {TITER}
RICK TYPHUS IGG TITR SER IF: NORMAL {TITER}
RICK TYPHUS IGM TITR SER IF: NORMAL {TITER}

## 2021-10-26 NOTE — TELEPHONE ENCOUNTER
PATIENT IS WANTING A REFERRAL FOR NEUROLOGY. SHE SAID SHE SPOKE WITH YOU ABOUT IT AT HER APPT. SHE DOESN'T HAVE ANYONE IN MIND, SO WHOEVER YOU THINK AND SHE'S ALSO BEEN CONCERNED REGARDING HER HIGH HEART RATE AND THE EKG YOU DID ON HER AT HER APPOINTMENT. SHE THINKS MAYBE SHE SHOULD SEE A CARDIOLOGIST. SHE WOULD LIKE A REFERRAL TO DR. WESTON.

## 2021-10-26 NOTE — TELEPHONE ENCOUNTER
I CALLED PATIENT AND LEFT HER A VOICEMAIL STATING I WILL NOT BE AFTER 12 TODAY AS SHE IS REQUESTING A CALL BACK AT 3:30  REGARDING HER LABS AND REFERRAL.

## 2021-11-05 ENCOUNTER — TELEPHONE (OUTPATIENT)
Dept: FAMILY MEDICINE CLINIC | Facility: CLINIC | Age: 61
End: 2021-11-05

## 2021-11-05 NOTE — TELEPHONE ENCOUNTER
Caller: Ela Paulino    Relationship: Self    Best call back number: [phone]    What specialty or service is being requested: CARDIOLOGIST    What is the provider, practice or medical service name: DR WESTON    What is the office phone number: [phone]    Any additional details:PATIENT ASKS THAT CARDIOLOGY  REFERRAL BE SENT TO DR WESTON   FAX: [phone]

## 2021-11-18 ENCOUNTER — TELEPHONE (OUTPATIENT)
Dept: CARDIOLOGY | Facility: CLINIC | Age: 61
End: 2021-11-18

## 2021-11-18 NOTE — TELEPHONE ENCOUNTER
Dr. Luevano's office contacted, spoke Devika, asked her to scan the EKG from 10/22/21 into the chart.

## 2021-11-29 ENCOUNTER — OFFICE VISIT (OUTPATIENT)
Dept: CARDIOLOGY | Facility: CLINIC | Age: 61
End: 2021-11-29

## 2021-11-29 VITALS
SYSTOLIC BLOOD PRESSURE: 110 MMHG | OXYGEN SATURATION: 99 % | BODY MASS INDEX: 25.4 KG/M2 | DIASTOLIC BLOOD PRESSURE: 74 MMHG | HEART RATE: 78 BPM | HEIGHT: 62 IN | WEIGHT: 138 LBS

## 2021-11-29 DIAGNOSIS — R00.2 PALPITATIONS: Primary | ICD-10-CM

## 2021-11-29 DIAGNOSIS — R53.83 FATIGUE, UNSPECIFIED TYPE: ICD-10-CM

## 2021-11-29 PROCEDURE — 99203 OFFICE O/P NEW LOW 30 MIN: CPT | Performed by: INTERNAL MEDICINE

## 2021-12-03 ENCOUNTER — TELEPHONE (OUTPATIENT)
Dept: CARDIOLOGY | Facility: CLINIC | Age: 61
End: 2021-12-03

## 2021-12-03 ENCOUNTER — PATIENT ROUNDING (BHMG ONLY) (OUTPATIENT)
Dept: CARDIOLOGY | Facility: CLINIC | Age: 61
End: 2021-12-03

## 2021-12-03 ENCOUNTER — HOSPITAL ENCOUNTER (OUTPATIENT)
Dept: CARDIOLOGY | Facility: HOSPITAL | Age: 61
Discharge: HOME | End: 2021-12-03
Admitting: INTERNAL MEDICINE

## 2021-12-03 VITALS — HEIGHT: 62 IN | BODY MASS INDEX: 25.4 KG/M2 | WEIGHT: 138 LBS

## 2021-12-03 DIAGNOSIS — R53.83 FATIGUE, UNSPECIFIED TYPE: ICD-10-CM

## 2021-12-03 DIAGNOSIS — R00.2 PALPITATIONS: ICD-10-CM

## 2021-12-03 LAB
BH CV ECHO MEAS - AO MAX PG (FULL): 0.14 MMHG
BH CV ECHO MEAS - AO MAX PG: 5.3 MMHG
BH CV ECHO MEAS - AO MEAN PG (FULL): -0.04 MMHG
BH CV ECHO MEAS - AO MEAN PG: 3 MMHG
BH CV ECHO MEAS - AO ROOT AREA (BSA CORRECTED): 1.6
BH CV ECHO MEAS - AO ROOT AREA: 5 CM^2
BH CV ECHO MEAS - AO ROOT DIAM: 2.5 CM
BH CV ECHO MEAS - AO V2 MAX: 114.6 CM/SEC
BH CV ECHO MEAS - AO V2 MEAN: 83.1 CM/SEC
BH CV ECHO MEAS - AO V2 VTI: 21.8 CM
BH CV ECHO MEAS - AVA(I,A): 2.1 CM^2
BH CV ECHO MEAS - AVA(I,D): 2.1 CM^2
BH CV ECHO MEAS - AVA(V,A): 2.5 CM^2
BH CV ECHO MEAS - AVA(V,D): 2.5 CM^2
BH CV ECHO MEAS - BSA(HAYCOCK): 1.6 M^2
BH CV ECHO MEAS - BSA: 1.6 M^2
BH CV ECHO MEAS - BZI_BMI: 24 KILOGRAMS/M^2
BH CV ECHO MEAS - BZI_METRIC_HEIGHT: 157.5 CM
BH CV ECHO MEAS - BZI_METRIC_WEIGHT: 59.4 KG
BH CV ECHO MEAS - EDV(CUBED): 53.1 ML
BH CV ECHO MEAS - EDV(MOD-SP4): 37.2 ML
BH CV ECHO MEAS - EDV(TEICH): 60.3 ML
BH CV ECHO MEAS - EF(CUBED): 74.8 %
BH CV ECHO MEAS - EF(MOD-SP4): 71.5 %
BH CV ECHO MEAS - EF(TEICH): 67.5 %
BH CV ECHO MEAS - ESV(CUBED): 13.4 ML
BH CV ECHO MEAS - ESV(MOD-SP4): 10.6 ML
BH CV ECHO MEAS - ESV(TEICH): 19.6 ML
BH CV ECHO MEAS - FS: 36.9 %
BH CV ECHO MEAS - IVS/LVPW: 0.65
BH CV ECHO MEAS - IVSD: 0.62 CM
BH CV ECHO MEAS - LV DIASTOLIC VOL/BSA (35-75): 23.3 ML/M^2
BH CV ECHO MEAS - LV MASS(C)D: 83.4 GRAMS
BH CV ECHO MEAS - LV MASS(C)DI: 52.2 GRAMS/M^2
BH CV ECHO MEAS - LV MAX PG: 5.1 MMHG
BH CV ECHO MEAS - LV MEAN PG: 3 MMHG
BH CV ECHO MEAS - LV SYSTOLIC VOL/BSA (12-30): 6.6 ML/M^2
BH CV ECHO MEAS - LV V1 MAX: 113 CM/SEC
BH CV ECHO MEAS - LV V1 MEAN: 82.9 CM/SEC
BH CV ECHO MEAS - LV V1 VTI: 18 CM
BH CV ECHO MEAS - LVIDD: 3.8 CM
BH CV ECHO MEAS - LVIDS: 2.4 CM
BH CV ECHO MEAS - LVOT AREA: 2.5 CM^2
BH CV ECHO MEAS - LVOT DIAM: 1.8 CM
BH CV ECHO MEAS - LVPWD: 0.96 CM
BH CV ECHO MEAS - MV A MAX VEL: 66.9 CM/SEC
BH CV ECHO MEAS - MV DEC SLOPE: 262.3 CM/SEC^2
BH CV ECHO MEAS - MV DEC TIME: 0.24 SEC
BH CV ECHO MEAS - MV E MAX VEL: 62.1 CM/SEC
BH CV ECHO MEAS - MV E/A: 0.93
BH CV ECHO MEAS - MV MAX PG: 2.9 MMHG
BH CV ECHO MEAS - MV MEAN PG: 1.2 MMHG
BH CV ECHO MEAS - MV V2 MAX: 85.6 CM/SEC
BH CV ECHO MEAS - MV V2 MEAN: 52 CM/SEC
BH CV ECHO MEAS - MV V2 VTI: 18.4 CM
BH CV ECHO MEAS - MVA(VTI): 2.5 CM^2
BH CV ECHO MEAS - RVDD: 1.5 CM
BH CV ECHO MEAS - SI(AO): 68.9 ML/M^2
BH CV ECHO MEAS - SI(CUBED): 24.9 ML/M^2
BH CV ECHO MEAS - SI(LVOT): 28.5 ML/M^2
BH CV ECHO MEAS - SI(MOD-SP4): 16.7 ML/M^2
BH CV ECHO MEAS - SI(TEICH): 25.5 ML/M^2
BH CV ECHO MEAS - SV(AO): 110 ML
BH CV ECHO MEAS - SV(CUBED): 39.7 ML
BH CV ECHO MEAS - SV(LVOT): 45.4 ML
BH CV ECHO MEAS - SV(MOD-SP4): 26.6 ML
BH CV ECHO MEAS - SV(TEICH): 40.7 ML
MAXIMAL PREDICTED HEART RATE: 160 BPM
STRESS TARGET HR: 136 BPM

## 2021-12-03 PROCEDURE — 93306 TTE W/DOPPLER COMPLETE: CPT | Performed by: INTERNAL MEDICINE

## 2021-12-03 PROCEDURE — 93306 TTE W/DOPPLER COMPLETE: CPT

## 2021-12-03 NOTE — PROGRESS NOTES
December 3, 2021    Hello, may I speak with Ela Paulino?    My name is BRIAN      I am  with [facility] - [place]  [address] [facility] [place] [address].    Before we get started may I verify your date of birth? 1960    I am calling to officially welcome you to our practice and ask about your recent visit. Is this a good time to talk? yes    Tell me about your visit with us. What things went well?  EVERYTHING WENT VERY WELL       We're always looking for ways to make our patients' experiences even better. Do you have recommendations on ways we may improve?  no    Overall were you satisfied with your first visit to our practice? yes       I appreciate you taking the time to speak with me today. Is there anything else I can do for you? no      Thank you, and have a great day.

## 2022-02-19 ENCOUNTER — TELEPHONE (OUTPATIENT)
Dept: FAMILY MEDICINE CLINIC | Facility: CLINIC | Age: 62
End: 2022-02-19

## 2022-02-19 NOTE — TELEPHONE ENCOUNTER
Please call the patient to find out when and where her last mammogram was as I would like to get a copy of it.  Thank you.

## 2022-02-22 NOTE — PROGRESS NOTES
Chief Complaint  Tremors    Subjective            Ela Paulino presents to [facility] for Tremors  History of Present Illness    New patient referred by Dr. San for tremors (bilateral arm/leg/foot)   patient states it feels like its going through her whole body   y and feels like a numbness,   she also  states her rt hand shakes occ.  Patient states tremors started 2021   she states her tremors is about the same  Nothing makes it better or worse,  This feeling is present any time during the day.   she states her father had tremors.   The tremor is an action tremor and sometimes notes a tremor at rest.    Gait unchanged    Pt notes some trouble with memory,  Not effected work     Paternal grandmother had Alzheimer disease, onset 60's  She  in her 70's,  in [place]. Paternal aunts and uncles -- but history not known.   Two sibs, brother and sister seem ok.     Labs have been normal TSH,     Has not noted change in sense of smell or taste, no constipation.   No dream related activity.     Sleeps poorly wakes frequent.      ====previous ov 10/26/21 dr san======  She is coming in today due to experiencing some new symptoms for the last month.  She tells me that for the last month or so she has been feeling shaky inside of her body.  At times it is her hands and arms and also her legs.  She also has been having this shaky feeling in her chest.  But she denies any rapid heartbeat feeling.  She has been more tired and fatigued and she also noted some memory problems.  She denies any chest pains, shortness of breath, nausea, or vomiting.  She is not on any new medications.  At times her legs almost feel restless, but her symptoms are not only limited to her legs.  No one-sided body weakness reported.    ECG 12 Lead     Date/Time: 10/22/2021 1:48 PM  Performed by: Analy San MD  Authorized by: Analy San MD   Comparison: not compared with previous ECG   Previous ECG:  no previous ECG available  Rhythm: sinus rhythm and sinus tachycardia  Rate: tachycardic  Conduction: conduction normal  ST Segments: ST segments normal  T Waves: T waves normal  QRS axis: normal  Other: no other findings    Clinical impression: normal ECG      Family History   Problem Relation Age of Onset   • Hypertension Mother    • Arthritis Mother    • Heart disease Mother    • Lung cancer Father    • Other Other         FH OF RA- SEVERAL FAMILY MEMBERS   • Heart disease Maternal Grandmother    • Mental illness Son         Autism       Past Medical History:   Diagnosis Date   • Abnormal ECG    • Anemia     As a kid   • Arthritis    • Cholelithiasis    • Hearing loss in right ear     Possible Menier's disease. Dr. Orr   • Low back pain    • Neck pain, acute    • Pituitary mass (HCC) 2006    [facility]; she gets MRI's every 2 years   • Positive VEENA (antinuclear antibody)        Social History     Socioeconomic History   • Marital status:    Tobacco Use   • Smoking status: Never Smoker   • Smokeless tobacco: Never Used   Vaping Use   • Vaping Use: Never used   Substance and Sexual Activity   • Alcohol use: Not Currently     Alcohol/week: 0.0 standard drinks   • Drug use: Never   • Sexual activity: Yes     Partners: Male     Birth control/protection: Post-menopausal         Current Outpatient Medications:   •  cyclobenzaprine (FLEXERIL) 10 MG tablet, Take 10 mg by mouth 3 (Three) Times a Day As Needed for Muscle Spasms., Disp: , Rfl:   •  multivitamin with minerals tablet tablet, Take 1 tablet by mouth Daily., Disp: , Rfl:     Review of Systems   Constitutional: Positive for fatigue.   Endocrine: Positive for cold intolerance.   Musculoskeletal: Positive for back pain, neck pain and neck stiffness.   Neurological: Positive for dizziness, tremors, weakness, light-headedness and numbness.   Psychiatric/Behavioral: Positive for sleep disturbance. The patient is nervous/anxious.    All other systems  "reviewed and are negative.           Objective   Vital Signs:   /74   Pulse 88   Temp 97.1 °F (36.2 °C) (Temporal)   Ht 157.5 cm (62\")   Wt 62.6 kg (138 lb)   BMI 25.24 kg/m²     Physical Exam  Vitals reviewed.   Constitutional:       Appearance: Normal appearance.   HENT:      Head: Normocephalic.      Nose: Nose normal.   Eyes:      Extraocular Movements: Extraocular movements intact.      Pupils: Pupils are equal, round, and reactive to light.   Cardiovascular:      Pulses: Normal pulses.   Pulmonary:      Effort: Pulmonary effort is normal. No respiratory distress.   Neurological:      Mental Status: She is alert and oriented to person, place, and time.      Coordination: Finger-Nose-Finger Test normal.      Gait: Gait is intact.      Deep Tendon Reflexes: Strength normal.      Reflex Scores:       Bicep reflexes are 2+ on the right side and 2+ on the left side.       Patellar reflexes are 2+ on the right side and 2+ on the left side.  Psychiatric:         Mood and Affect: Mood normal.        Result Review :                Neurologic Exam     Mental Status   Oriented to person, place, and time.   Level of consciousness: alert  Knowledge: good.     Cranial Nerves   Cranial nerves II through XII intact.     CN III, IV, VI   Pupils are equal, round, and reactive to light.    Motor Exam   Overall muscle tone: normal  Right arm pronator drift: absent  Left arm pronator drift: absent    Strength   Strength 5/5 throughout.     Sensory Exam   Light touch normal.   Vibration normal.     Gait, Coordination, and Reflexes     Gait  Gait: normal    Coordination   Finger to nose coordination: normal    Tremor   Resting tremor: slight right arm resting tremor.  Action tremor: right arm    Reflexes   Right biceps: 2+  Left biceps: 2+  Right patellar: 2+  Left patellar: 2+             Assessment and Plan    Diagnoses and all orders for this visit:    1. Tremor of both hands (Primary)  -     NM Brain DaTScan; " Future    2. Tremor of right hand    pt has resting tremor of right hand only suggesting a parkinson tremor, but feels a tremor through out the arms and legs  Will obtain JALEEL scan and then consider medications    Follow Up   Return in about 1 year (around 2/23/2023).  Patient was given instructions and counseling regarding her condition or for health maintenance advice. Please see specific information pulled into the AVS if appropriate.         This document has been electronically signed by Joseph Seipel, MD on February 23, 2022 15:43 EST

## 2022-02-23 ENCOUNTER — OFFICE VISIT (OUTPATIENT)
Dept: NEUROLOGY | Facility: CLINIC | Age: 62
End: 2022-02-23

## 2022-02-23 VITALS
DIASTOLIC BLOOD PRESSURE: 74 MMHG | SYSTOLIC BLOOD PRESSURE: 105 MMHG | WEIGHT: 138 LBS | HEART RATE: 88 BPM | HEIGHT: 62 IN | TEMPERATURE: 97.1 F | BODY MASS INDEX: 25.4 KG/M2

## 2022-02-23 DIAGNOSIS — R25.1 TREMOR OF RIGHT HAND: ICD-10-CM

## 2022-02-23 DIAGNOSIS — R25.1 TREMOR OF BOTH HANDS: Primary | ICD-10-CM

## 2022-02-23 PROCEDURE — 99204 OFFICE O/P NEW MOD 45 MIN: CPT | Performed by: PSYCHIATRY & NEUROLOGY

## 2022-02-23 RX ORDER — CYCLOBENZAPRINE HCL 10 MG
10 TABLET ORAL 3 TIMES DAILY PRN
COMMUNITY
End: 2022-03-31

## 2022-03-25 ENCOUNTER — TELEPHONE (OUTPATIENT)
Dept: FAMILY MEDICINE CLINIC | Facility: CLINIC | Age: 62
End: 2022-03-25

## 2022-03-25 RX ORDER — FLUCONAZOLE 150 MG/1
150 TABLET ORAL ONCE
Qty: 1 TABLET | Refills: 0 | Status: SHIPPED | OUTPATIENT
Start: 2022-03-25 | End: 2022-03-29

## 2022-03-25 NOTE — TELEPHONE ENCOUNTER
Caller: Ela Paulino    Relationship: Self    Best call back number: [phone]    What medication are you requesting: WHATEVER HER PCP RECOMMENDS - THE PATIENT HAS BEEN ON AN ANTIBIOTIC FOR AN EARACHE/SINUS INFECTION    What are your current symptoms: VAGINAL ITCHING, SLIGHT PAIN WHEN URINATING    How long have you been experiencing symptoms: 3-4 DAYS    Have you had these symptoms before:    [x] Yes  [] No    Have you been treated for these symptoms before:   [x] Yes  [] No    If a prescription is needed, what is your preferred pharmacy and phone number: [facility]

## 2022-03-25 NOTE — TELEPHONE ENCOUNTER
Her symptoms might be due to vaginal yeast infection.  Please advise the patient that I sent prescription for Diflucan to her pharmacy.  If her symptoms do not resolve she will need to make an appointment for evaluation.  Thank you.

## 2022-03-31 ENCOUNTER — OFFICE VISIT (OUTPATIENT)
Dept: FAMILY MEDICINE CLINIC | Facility: CLINIC | Age: 62
End: 2022-03-31

## 2022-03-31 ENCOUNTER — LAB (OUTPATIENT)
Dept: LAB | Facility: HOSPITAL | Age: 62
End: 2022-03-31

## 2022-03-31 VITALS
SYSTOLIC BLOOD PRESSURE: 115 MMHG | BODY MASS INDEX: 25.01 KG/M2 | HEART RATE: 100 BPM | DIASTOLIC BLOOD PRESSURE: 75 MMHG | HEIGHT: 62 IN | RESPIRATION RATE: 16 BRPM | OXYGEN SATURATION: 95 % | WEIGHT: 135.9 LBS | TEMPERATURE: 98 F

## 2022-03-31 DIAGNOSIS — J06.9 UPPER RESPIRATORY TRACT INFECTION, UNSPECIFIED TYPE: ICD-10-CM

## 2022-03-31 DIAGNOSIS — R25.2 MUSCLE CRAMP, NOCTURNAL: Primary | ICD-10-CM

## 2022-03-31 DIAGNOSIS — J10.1 INFLUENZA A: ICD-10-CM

## 2022-03-31 DIAGNOSIS — R50.9 FEVER AND CHILLS: ICD-10-CM

## 2022-03-31 LAB
EXPIRATION DATE: ABNORMAL
FLUAV AG UPPER RESP QL IA.RAPID: DETECTED
FLUBV AG UPPER RESP QL IA.RAPID: NOT DETECTED
INTERNAL CONTROL: ABNORMAL
Lab: ABNORMAL
SARS-COV-2 AG UPPER RESP QL IA.RAPID: NOT DETECTED

## 2022-03-31 PROCEDURE — 36415 COLL VENOUS BLD VENIPUNCTURE: CPT | Performed by: FAMILY MEDICINE

## 2022-03-31 PROCEDURE — 99214 OFFICE O/P EST MOD 30 MIN: CPT | Performed by: FAMILY MEDICINE

## 2022-03-31 PROCEDURE — 82607 VITAMIN B-12: CPT | Performed by: FAMILY MEDICINE

## 2022-03-31 PROCEDURE — U0004 COV-19 TEST NON-CDC HGH THRU: HCPCS | Performed by: FAMILY MEDICINE

## 2022-03-31 PROCEDURE — 87428 SARSCOV & INF VIR A&B AG IA: CPT | Performed by: FAMILY MEDICINE

## 2022-03-31 PROCEDURE — 80053 COMPREHEN METABOLIC PANEL: CPT | Performed by: FAMILY MEDICINE

## 2022-03-31 PROCEDURE — 83735 ASSAY OF MAGNESIUM: CPT | Performed by: FAMILY MEDICINE

## 2022-03-31 RX ORDER — OSELTAMIVIR PHOSPHATE 75 MG/1
75 CAPSULE ORAL 2 TIMES DAILY
Qty: 10 CAPSULE | Refills: 0 | Status: SHIPPED | OUTPATIENT
Start: 2022-03-31 | End: 2022-04-05

## 2022-03-31 RX ORDER — OMEPRAZOLE 20 MG/1
CAPSULE, DELAYED RELEASE ORAL
COMMUNITY
Start: 2022-03-02 | End: 2022-09-12

## 2022-03-31 RX ORDER — CYCLOBENZAPRINE HCL 10 MG
10 TABLET ORAL 3 TIMES DAILY PRN
COMMUNITY
End: 2022-03-31 | Stop reason: SDUPTHER

## 2022-03-31 RX ORDER — CYCLOBENZAPRINE HCL 10 MG
10 TABLET ORAL NIGHTLY PRN
Qty: 30 TABLET | Refills: 0 | Status: SHIPPED | OUTPATIENT
Start: 2022-03-31 | End: 2022-04-28 | Stop reason: SDUPTHER

## 2022-03-31 NOTE — PROGRESS NOTES
Subjective   Chief Complaint   Patient presents with   • Cough   • Fever     Last pm   • Nasal Congestion   • leg cramps     Ela Paulino is a 62 y.o. female.     Patient Care Team:  Analy San MD as PCP - General  Magalis Galindo MD as Consulting Physician (Obstetrics and Gynecology)  Alice Garcia MD as Consulting Physician (Rheumatology)  Feng Vásquez MD as Consulting Physician (Cardiology)    She is coming in today to discuss some of her symptoms and concerns.  She tells me that for the last 2 weeks she has been experiencing pretty bad leg cramps affecting her feet and calfs, it happens at night and keeps her up and wakes up her up at night.  Lately she also noted some cramping much milder however through the day.  She typically takes cyclobenzaprine as needed for muscle cramps, which was given to her a while back and she wonders if she can get refill on it.  She also wants to talk about the upper respiratory symptoms, which started yesterday.  She started with some congestion, chest tightness, some cough and even headache.  She even had elevated temperature at 100.2 last night.  She was treated for sinus infection by her ENT about 3 weeks ago and at that time she was given cefdinir.       The following portions of the patient's history were reviewed and updated as appropriate: allergies, current medications, past family history, past medical history, past social history, past surgical history and problem list.  Past Medical History:   Diagnosis Date   • Abnormal ECG    • Anemia     As a kid   • Arthritis    • Cholelithiasis    • Hearing loss in right ear     Possible Menier's disease. Dr. Orr   • Low back pain    • Neck pain, acute    • Pituitary mass (HCC) 2006    [facility]; she gets MRI's every 2 years   • Positive VEENA (antinuclear antibody)      Past Surgical History:   Procedure Laterality Date   • COLONOSCOPY  06/09/2014    negative- 10 yr repeat   • CYST REMOVAL  "Right     wrist   • DILATATION AND CURETTAGE     • EYE SURGERY     • OTHER SURGICAL HISTORY      Laparoscopy   • VAGINAL DELIVERY       -  X3   • WISDOM TOOTH EXTRACTION       The patient has a family history of  Family History   Problem Relation Age of Onset   • Hypertension Mother    • Arthritis Mother    • Heart disease Mother    • Lung cancer Father    • Other Other         FH OF RA- SEVERAL FAMILY MEMBERS   • Heart disease Maternal Grandmother    • Mental illness Son         Autism   • Stroke Paternal Grandmother      Social History     Socioeconomic History   • Marital status:    Tobacco Use   • Smoking status: Never Smoker   • Smokeless tobacco: Never Used   Vaping Use   • Vaping Use: Never used   Substance and Sexual Activity   • Alcohol use: Not Currently   • Drug use: Never   • Sexual activity: Yes     Partners: Male     Birth control/protection: Post-menopausal       Review of Systems   Constitutional: Positive for fatigue and fever. Negative for activity change, appetite change and chills.   HENT: Positive for congestion and sore throat. Negative for ear pain, postnasal drip, sinus pressure and swollen glands.    Respiratory: Positive for cough. Negative for choking, chest tightness, shortness of breath, wheezing and stridor.    Cardiovascular: Negative for chest pain.   Musculoskeletal: Positive for myalgias.   Skin: Negative for dry skin and rash.   Neurological: Positive for headache.     Visit Vitals  /75 (BP Location: Left arm, Patient Position: Sitting, Cuff Size: Adult)   Pulse 100   Temp 98 °F (36.7 °C)   Resp 16   Ht 157.5 cm (62.01\")   Wt 61.6 kg (135 lb 14.4 oz)   SpO2 95%   BMI 24.85 kg/m²       Current Outpatient Medications:   •  cyclobenzaprine (FLEXERIL) 10 MG tablet, Take 1 tablet by mouth At Night As Needed for Muscle Spasms., Disp: 30 tablet, Rfl: 0  •  multivitamin with minerals tablet tablet, Take 1 tablet by mouth Daily., Disp: , Rfl:   •  omeprazole (priLOSEC) " 20 MG capsule, , Disp: , Rfl:   •  oseltamivir (TAMIFLU) 75 MG capsule, Take 1 capsule by mouth 2 (Two) Times a Day for 5 days., Disp: 10 capsule, Rfl: 0    Objective   Physical Exam  Vitals and nursing note reviewed.   Constitutional:       General: She is not in acute distress.     Appearance: She is well-developed.   HENT:      Head: Normocephalic and atraumatic.      Comments: Some inflammatory changes in the pharyngeal area with postnasal drainage noted.  Congestion noted.     Right Ear: External ear normal.      Left Ear: External ear normal.      Mouth/Throat:      Pharynx: No oropharyngeal exudate.   Eyes:      Conjunctiva/sclera: Conjunctivae normal.      Pupils: Pupils are equal, round, and reactive to light.   Cardiovascular:      Rate and Rhythm: Normal rate and regular rhythm.      Heart sounds: Normal heart sounds.   Pulmonary:      Effort: Pulmonary effort is normal. No respiratory distress.      Breath sounds: Normal breath sounds. No wheezing or rales.   Musculoskeletal:      Cervical back: Normal range of motion and neck supple.   Skin:     General: Skin is warm and dry.      Findings: No rash.         Assessment/Plan   Diagnoses and all orders for this visit:    1. Muscle cramp, nocturnal (Primary)  -     Comprehensive Metabolic Panel  -     Magnesium  -     Vitamin B12  -     cyclobenzaprine (FLEXERIL) 10 MG tablet; Take 1 tablet by mouth At Night As Needed for Muscle Spasms.  Dispense: 30 tablet; Refill: 0    2. Upper respiratory tract infection, unspecified type  -     COVID-19,APTIMA PANTHER(RANDY), BUDDY/ [facility], NP/OP SWAB IN UTM/VTM/SALINE TRANSPORT MEDIA,24 HR TAT - Swab, Nasopharynx; Future  -     COVID-19,APTIMA PANTHER(RANDY),[facility] BUDDY/[facility], NP/OP SWAB IN UTM/VTM/SALINE TRANSPORT MEDIA,24 HR TAT - Swab, Nasopharynx    3. Fever and chills  -     POCT SARS-CoV-2 Antigen NOEL    4. Influenza A  -     oseltamivir (TAMIFLU) 75 MG capsule; Take 1 capsule by mouth 2 (Two) Times a Day for 5 days.   Dispense: 10 capsule; Refill: 0      If it comes to her muscle spasms I will be getting blood work to check electrolytes.  I also refilled her Flexeril, which he takes as needed.  Her rapid COVID-19 test today was negative, but her flu test was positive for influenza A.  I will be starting her on Tamiflu.  I will be also getting PCR COVID-19 test.  Symptomatic treatment was also recommended, she is to monitor her symptoms and contact us back if no improvement or worsening or new symptoms develop.        Return if symptoms worsen or fail to improve, for Recheck.    Requested Prescriptions     Signed Prescriptions Disp Refills   • cyclobenzaprine (FLEXERIL) 10 MG tablet 30 tablet 0     Sig: Take 1 tablet by mouth At Night As Needed for Muscle Spasms.   • oseltamivir (TAMIFLU) 75 MG capsule 10 capsule 0     Sig: Take 1 capsule by mouth 2 (Two) Times a Day for 5 days.

## 2022-04-01 DIAGNOSIS — R25.2 MUSCLE CRAMP, NOCTURNAL: Primary | ICD-10-CM

## 2022-04-01 LAB
ALBUMIN SERPL-MCNC: 4.8 G/DL (ref 3.5–5.2)
ALBUMIN/GLOB SERPL: 1.7 G/DL
ALP SERPL-CCNC: 70 U/L (ref 39–117)
ALT SERPL W P-5'-P-CCNC: 25 U/L (ref 1–33)
ANION GAP SERPL CALCULATED.3IONS-SCNC: 10 MMOL/L (ref 5–15)
AST SERPL-CCNC: 22 U/L (ref 1–32)
BILIRUB SERPL-MCNC: 0.4 MG/DL (ref 0–1.2)
BUN SERPL-MCNC: 12 MG/DL (ref 8–23)
BUN/CREAT SERPL: 12 (ref 7–25)
CALCIUM SPEC-SCNC: 9.3 MG/DL (ref 8.6–10.5)
CHLORIDE SERPL-SCNC: 97 MMOL/L (ref 98–107)
CO2 SERPL-SCNC: 27 MMOL/L (ref 22–29)
CREAT SERPL-MCNC: 1 MG/DL (ref 0.57–1)
EGFRCR SERPLBLD CKD-EPI 2021: 64.2 ML/MIN/1.73
GLOBULIN UR ELPH-MCNC: 2.9 GM/DL
GLUCOSE SERPL-MCNC: 81 MG/DL (ref 65–99)
MAGNESIUM SERPL-MCNC: 2.2 MG/DL (ref 1.6–2.4)
POTASSIUM SERPL-SCNC: 3.8 MMOL/L (ref 3.5–5.2)
PROT SERPL-MCNC: 7.7 G/DL (ref 6–8.5)
SARS-COV-2 ORF1AB RESP QL NAA+PROBE: NOT DETECTED
SODIUM SERPL-SCNC: 134 MMOL/L (ref 136–145)
VIT B12 BLD-MCNC: 507 PG/ML (ref 211–946)

## 2022-04-01 RX ORDER — POTASSIUM CHLORIDE 750 MG/1
10 TABLET, FILM COATED, EXTENDED RELEASE ORAL 2 TIMES DAILY
Qty: 30 TABLET | Refills: 0 | Status: SHIPPED | OUTPATIENT
Start: 2022-04-01 | End: 2022-04-19 | Stop reason: SDUPTHER

## 2022-04-14 ENCOUNTER — LAB (OUTPATIENT)
Dept: LAB | Facility: HOSPITAL | Age: 62
End: 2022-04-14

## 2022-04-14 LAB
ANION GAP SERPL CALCULATED.3IONS-SCNC: 11 MMOL/L (ref 5–15)
BUN SERPL-MCNC: 17 MG/DL (ref 8–23)
BUN/CREAT SERPL: 18.1 (ref 7–25)
CALCIUM SPEC-SCNC: 9.5 MG/DL (ref 8.6–10.5)
CHLORIDE SERPL-SCNC: 102 MMOL/L (ref 98–107)
CO2 SERPL-SCNC: 25 MMOL/L (ref 22–29)
CREAT SERPL-MCNC: 0.94 MG/DL (ref 0.57–1)
EGFRCR SERPLBLD CKD-EPI 2021: 69.2 ML/MIN/1.73
GLUCOSE SERPL-MCNC: 117 MG/DL (ref 65–99)
POTASSIUM SERPL-SCNC: 4.8 MMOL/L (ref 3.5–5.2)
SODIUM SERPL-SCNC: 138 MMOL/L (ref 136–145)

## 2022-04-14 PROCEDURE — 80048 BASIC METABOLIC PNL TOTAL CA: CPT | Performed by: FAMILY MEDICINE

## 2022-04-14 PROCEDURE — 36415 COLL VENOUS BLD VENIPUNCTURE: CPT | Performed by: FAMILY MEDICINE

## 2022-04-19 DIAGNOSIS — R25.2 MUSCLE CRAMP, NOCTURNAL: ICD-10-CM

## 2022-04-19 RX ORDER — POTASSIUM CHLORIDE 750 MG/1
10 TABLET, FILM COATED, EXTENDED RELEASE ORAL 2 TIMES DAILY
Qty: 30 TABLET | Refills: 0 | Status: SHIPPED | OUTPATIENT
Start: 2022-04-19 | End: 2022-04-28 | Stop reason: SDUPTHER

## 2022-04-28 DIAGNOSIS — R25.2 MUSCLE CRAMP, NOCTURNAL: ICD-10-CM

## 2022-04-28 RX ORDER — POTASSIUM CHLORIDE 750 MG/1
10 TABLET, FILM COATED, EXTENDED RELEASE ORAL 2 TIMES DAILY
Qty: 30 TABLET | Refills: 0 | Status: SHIPPED | OUTPATIENT
Start: 2022-04-28 | End: 2022-05-21 | Stop reason: SDUPTHER

## 2022-04-28 RX ORDER — CYCLOBENZAPRINE HCL 10 MG
10 TABLET ORAL NIGHTLY PRN
Qty: 30 TABLET | Refills: 0 | Status: SHIPPED | OUTPATIENT
Start: 2022-04-28 | End: 2022-05-21 | Stop reason: SDUPTHER

## 2022-05-21 DIAGNOSIS — R25.2 MUSCLE CRAMP, NOCTURNAL: ICD-10-CM

## 2022-05-21 RX ORDER — POTASSIUM CHLORIDE 750 MG/1
10 TABLET, FILM COATED, EXTENDED RELEASE ORAL 2 TIMES DAILY
Qty: 30 TABLET | Refills: 0 | Status: SHIPPED | OUTPATIENT
Start: 2022-05-21 | End: 2022-06-09 | Stop reason: SDUPTHER

## 2022-05-21 RX ORDER — CYCLOBENZAPRINE HCL 10 MG
10 TABLET ORAL NIGHTLY PRN
Qty: 30 TABLET | Refills: 0 | Status: SHIPPED | OUTPATIENT
Start: 2022-05-21 | End: 2022-07-24 | Stop reason: SDUPTHER

## 2022-06-09 DIAGNOSIS — R25.2 MUSCLE CRAMP, NOCTURNAL: ICD-10-CM

## 2022-06-10 ENCOUNTER — TELEPHONE (OUTPATIENT)
Dept: FAMILY MEDICINE CLINIC | Facility: CLINIC | Age: 62
End: 2022-06-10

## 2022-06-10 RX ORDER — POTASSIUM CHLORIDE 750 MG/1
10 TABLET, FILM COATED, EXTENDED RELEASE ORAL 2 TIMES DAILY
Qty: 30 TABLET | Refills: 0 | Status: SHIPPED | OUTPATIENT
Start: 2022-06-10 | End: 2022-06-30 | Stop reason: SDUPTHER

## 2022-06-10 NOTE — TELEPHONE ENCOUNTER
Please advise the patient that she needs to be seen and evaluated for her symptoms.  Unfortunately I do not have any openings today, however I can see her on Monday if this can wait until then.  Otherwise she can go to the urgent care for evaluation.  Thank you.

## 2022-06-10 NOTE — TELEPHONE ENCOUNTER
Caller: Ela Paulino    Relationship: Self    Best call back number: [phone]      What medication are you requesting: ANTIBIOTIC AND STEROID HOLDEN     What are your current symptoms: PAIN IN FACE JAW HURTS FEELS LIKE TEETH ARE GOING TO FALL OUT, EARS ARE HURTING, NO FEVER, A LOT OF DRAINAGE.    How long have you been experiencing symptoms: SINCE Monday.      If a prescription is needed, what is your preferred pharmacy and phone number: [facility] [place]     Additional notes:  PATIENT STATES SHE IS NOT SURE IF DOCTOR CAN JUST SEND SOMETHING IN FOR SYMPTOMS OR DOES SHE AN APPT CAN YOU PLEASE LET HER KNOW.

## 2022-06-30 DIAGNOSIS — R25.2 MUSCLE CRAMP, NOCTURNAL: ICD-10-CM

## 2022-06-30 RX ORDER — POTASSIUM CHLORIDE 750 MG/1
10 TABLET, FILM COATED, EXTENDED RELEASE ORAL 2 TIMES DAILY
Qty: 30 TABLET | Refills: 0 | Status: SHIPPED | OUTPATIENT
Start: 2022-06-30 | End: 2022-10-06

## 2022-07-12 ENCOUNTER — TRANSCRIBE ORDERS (OUTPATIENT)
Dept: ADMINISTRATIVE | Facility: HOSPITAL | Age: 62
End: 2022-07-12

## 2022-07-12 ENCOUNTER — LAB (OUTPATIENT)
Dept: LAB | Facility: HOSPITAL | Age: 62
End: 2022-07-12

## 2022-07-12 DIAGNOSIS — Z71.89 OTHER SPECIFIED COUNSELING: ICD-10-CM

## 2022-07-12 DIAGNOSIS — L82.1 OTHER SEBORRHEIC KERATOSIS: ICD-10-CM

## 2022-07-12 DIAGNOSIS — B00.1 HERPESVIRAL VESICULAR DERMATITIS: ICD-10-CM

## 2022-07-12 DIAGNOSIS — D18.01 HEMANGIOMA OF SKIN AND SUBCUTANEOUS TISSUE: ICD-10-CM

## 2022-07-12 DIAGNOSIS — D69.2 OTHER NONTHROMBOCYTOPENIC PURPURA: ICD-10-CM

## 2022-07-12 DIAGNOSIS — D22.9 MELANOCYTIC NEVUS, UNSPECIFIED LOCATION: ICD-10-CM

## 2022-07-12 DIAGNOSIS — D22.9 MELANOCYTIC NEVUS, UNSPECIFIED LOCATION: Primary | ICD-10-CM

## 2022-07-12 DIAGNOSIS — L81.4 OTHER MELANIN HYPERPIGMENTATION: ICD-10-CM

## 2022-07-12 LAB
ALBUMIN SERPL-MCNC: 4.4 G/DL (ref 3.5–5.2)
ALBUMIN/GLOB SERPL: 1.4 G/DL
ALP SERPL-CCNC: 75 U/L (ref 39–117)
ALT SERPL W P-5'-P-CCNC: 23 U/L (ref 1–33)
ANION GAP SERPL CALCULATED.3IONS-SCNC: 9.9 MMOL/L (ref 5–15)
AST SERPL-CCNC: 22 U/L (ref 1–32)
BASOPHILS # BLD AUTO: 0.07 10*3/MM3 (ref 0–0.2)
BASOPHILS NFR BLD AUTO: 1.1 % (ref 0–1.5)
BILIRUB SERPL-MCNC: 0.4 MG/DL (ref 0–1.2)
BUN SERPL-MCNC: 15 MG/DL (ref 8–23)
BUN/CREAT SERPL: 17.9 (ref 7–25)
CALCIUM SPEC-SCNC: 9.7 MG/DL (ref 8.6–10.5)
CHLORIDE SERPL-SCNC: 105 MMOL/L (ref 98–107)
CO2 SERPL-SCNC: 27.1 MMOL/L (ref 22–29)
CREAT SERPL-MCNC: 0.84 MG/DL (ref 0.57–1)
DEPRECATED RDW RBC AUTO: 41.3 FL (ref 37–54)
EGFRCR SERPLBLD CKD-EPI 2021: 79.2 ML/MIN/1.73
EOSINOPHIL # BLD AUTO: 0.14 10*3/MM3 (ref 0–0.4)
EOSINOPHIL NFR BLD AUTO: 2.2 % (ref 0.3–6.2)
ERYTHROCYTE [DISTWIDTH] IN BLOOD BY AUTOMATED COUNT: 13.7 % (ref 12.3–15.4)
GLOBULIN UR ELPH-MCNC: 3.1 GM/DL
GLUCOSE SERPL-MCNC: 106 MG/DL (ref 65–99)
HCT VFR BLD AUTO: 39.4 % (ref 34–46.6)
HGB BLD-MCNC: 13.4 G/DL (ref 12–15.9)
IMM GRANULOCYTES # BLD AUTO: 0.04 10*3/MM3 (ref 0–0.05)
IMM GRANULOCYTES NFR BLD AUTO: 0.6 % (ref 0–0.5)
LYMPHOCYTES # BLD AUTO: 1.89 10*3/MM3 (ref 0.7–3.1)
LYMPHOCYTES NFR BLD AUTO: 29.6 % (ref 19.6–45.3)
MCH RBC QN AUTO: 28.8 PG (ref 26.6–33)
MCHC RBC AUTO-ENTMCNC: 34 G/DL (ref 31.5–35.7)
MCV RBC AUTO: 84.5 FL (ref 79–97)
MONOCYTES # BLD AUTO: 0.54 10*3/MM3 (ref 0.1–0.9)
MONOCYTES NFR BLD AUTO: 8.5 % (ref 5–12)
NEUTROPHILS NFR BLD AUTO: 3.7 10*3/MM3 (ref 1.7–7)
NEUTROPHILS NFR BLD AUTO: 58 % (ref 42.7–76)
NRBC BLD AUTO-RTO: 0 /100 WBC (ref 0–0.2)
PLATELET # BLD AUTO: 394 10*3/MM3 (ref 140–450)
PMV BLD AUTO: 8.7 FL (ref 6–12)
POTASSIUM SERPL-SCNC: 4.8 MMOL/L (ref 3.5–5.2)
PROT SERPL-MCNC: 7.5 G/DL (ref 6–8.5)
RBC # BLD AUTO: 4.66 10*6/MM3 (ref 3.77–5.28)
SODIUM SERPL-SCNC: 142 MMOL/L (ref 136–145)
WBC NRBC COR # BLD: 6.38 10*3/MM3 (ref 3.4–10.8)

## 2022-07-12 PROCEDURE — 36415 COLL VENOUS BLD VENIPUNCTURE: CPT

## 2022-07-12 PROCEDURE — 80053 COMPREHEN METABOLIC PANEL: CPT

## 2022-07-12 PROCEDURE — 85025 COMPLETE CBC W/AUTO DIFF WBC: CPT

## 2022-07-24 DIAGNOSIS — R25.2 MUSCLE CRAMP, NOCTURNAL: ICD-10-CM

## 2022-07-24 RX ORDER — CYCLOBENZAPRINE HCL 10 MG
10 TABLET ORAL NIGHTLY PRN
Qty: 30 TABLET | Refills: 0 | Status: SHIPPED | OUTPATIENT
Start: 2022-07-24 | End: 2022-09-12 | Stop reason: SDUPTHER

## 2022-08-11 ENCOUNTER — TELEPHONE (OUTPATIENT)
Dept: FAMILY MEDICINE CLINIC | Facility: CLINIC | Age: 62
End: 2022-08-11

## 2022-08-11 NOTE — TELEPHONE ENCOUNTER
I recommend to continue with over-the-counter medication for symptom control like Tylenol and ibuprofen for fever and pain, she may also take Mucinex as needed for cough.  Get plenty of rest and stay hydrated and continue to closely monitor to symptoms.  She does not meet criteria for treatment with Paxlovid as this is only recommended for people older than 65 or people younger than that with certain medical conditions.  If her symptoms do not improve or get worse she will need to get evaluated.  Please let me know if she has any questions.  Thank you.

## 2022-08-11 NOTE — TELEPHONE ENCOUNTER
Caller: Ela Paulino    Relationship to patient: Self    Best call back number: [phone]    Date of positive COVID19 test: 08.10.22    COVID19 symptoms: HEADACHE, CONGESTION, HOARSE VOICE, BODY ACHES, FEVER     Date of initial quarantine: 08.10.22    Additional information or concerns: WOULD LIKE TO KNOW IF THERE'S ANYTHING ELSE SHE NEEDS TO DO REGARDING HER SYMPTOMS     What is the patients preferred pharmacy: [id number], [place] - [address] AT [address] [phone] [place] [phone] FX

## 2022-09-12 ENCOUNTER — OFFICE VISIT (OUTPATIENT)
Dept: FAMILY MEDICINE CLINIC | Facility: CLINIC | Age: 62
End: 2022-09-12

## 2022-09-12 VITALS
OXYGEN SATURATION: 97 % | BODY MASS INDEX: 25.95 KG/M2 | SYSTOLIC BLOOD PRESSURE: 101 MMHG | HEIGHT: 62 IN | WEIGHT: 141 LBS | DIASTOLIC BLOOD PRESSURE: 69 MMHG | HEART RATE: 85 BPM | TEMPERATURE: 97.5 F | RESPIRATION RATE: 16 BRPM

## 2022-09-12 DIAGNOSIS — R23.3 PETECHIAL RASH: Primary | ICD-10-CM

## 2022-09-12 DIAGNOSIS — R25.2 MUSCLE CRAMP, NOCTURNAL: ICD-10-CM

## 2022-09-12 PROBLEM — J06.9 UPPER RESPIRATORY TRACT INFECTION: Status: RESOLVED | Noted: 2022-03-31 | Resolved: 2022-09-12

## 2022-09-12 PROBLEM — R50.9 FEVER AND CHILLS: Status: RESOLVED | Noted: 2022-03-31 | Resolved: 2022-09-12

## 2022-09-12 PROBLEM — J10.1 INFLUENZA A: Status: RESOLVED | Noted: 2022-03-31 | Resolved: 2022-09-12

## 2022-09-12 PROCEDURE — 99214 OFFICE O/P EST MOD 30 MIN: CPT | Performed by: FAMILY MEDICINE

## 2022-09-12 RX ORDER — CYCLOBENZAPRINE HCL 10 MG
10 TABLET ORAL NIGHTLY PRN
Qty: 30 TABLET | Refills: 0 | Status: SHIPPED | OUTPATIENT
Start: 2022-09-12 | End: 2022-10-06

## 2022-09-12 NOTE — PROGRESS NOTES
Subjective   Chief Complaint   Patient presents with   • Bleeding/Bruising   • Muscle cramps     Ela Paulino is a 62 y.o. female.     Patient Care Team:  Analy San MD as PCP - General  Magalis Galindo MD as Consulting Physician (Obstetrics and Gynecology)  Alice Garcia MD as Consulting Physician (Rheumatology)  Feng Vásquez MD as Consulting Physician (Cardiology)    Bruising on and off 2 months  The shins and forearms  She is coming in today due to skin concerns.  She reports that for the last 2 months or maybe even longer she has been experiencing some bruising, which comes and goes.  It is primarily located on her forearms and her shin areas.  She is not aware of any trauma to these areas.  She does not take any blood thinner.  She is not on any new medications.  When it first started she went to see her dermatology office and she had blood work done including CMP and CBC, she was advised that everything was within normal limits.  She denies any other bleedings, she denies any vomiting of blood, black stools, or bright red blood in the stool.  She is currently followed by rheumatologist due to chronic arthritis.  She tells me that she was in the recent past ruled out for rheumatoid arthritis.  She also has been experiencing a lot of crampings in her legs, these are especially worse at night.  She periodically takes cyclobenzaprine as needed, this is however not new for her.       The following portions of the patient's history were reviewed and updated as appropriate: allergies, current medications, past family history, past medical history, past social history, past surgical history and problem list.  Past Medical History:   Diagnosis Date   • Abnormal ECG    • Anemia     As a kid   • Arthritis    • Cholelithiasis    • Depression    • Hearing loss in right ear     Possible Menier's disease. Dr. Orr   • Low back pain    • Neck pain, acute    • Pituitary mass (HCC) 2006  "   [facility]; she gets MRI's every 2 years   • Positive VEENA (antinuclear antibody)      Past Surgical History:   Procedure Laterality Date   • COLONOSCOPY  2014    negative- 10 yr repeat   • CYST REMOVAL Right     wrist   • DILATATION AND CURETTAGE     • EYE SURGERY     • OTHER SURGICAL HISTORY      Laparoscopy   • VAGINAL DELIVERY       -  X3   • WISDOM TOOTH EXTRACTION       The patient has a family history of  Family History   Problem Relation Age of Onset   • Hypertension Mother    • Arthritis Mother    • Heart disease Mother    • Lung cancer Father    • Other Other         FH OF RA- SEVERAL FAMILY MEMBERS   • Heart disease Maternal Grandmother    • Mental illness Son         Autism   • Stroke Paternal Grandmother      Social History     Socioeconomic History   • Marital status:    Tobacco Use   • Smoking status: Never Smoker   • Smokeless tobacco: Never Used   Vaping Use   • Vaping Use: Never used   Substance and Sexual Activity   • Alcohol use: Not Currently   • Drug use: Never   • Sexual activity: Yes     Partners: Male     Birth control/protection: Post-menopausal       Review of Systems   Constitutional: Negative for activity change, fatigue and fever.   Respiratory: Negative for shortness of breath and wheezing.    Cardiovascular: Negative for chest pain, palpitations and leg swelling.   Musculoskeletal: Negative for arthralgias and back pain.   Skin: Negative for rash.   Neurological: Negative for tremors and headache.   Hematological: Bruises/bleeds easily.     Visit Vitals  /69 (BP Location: Left arm, Patient Position: Sitting, Cuff Size: Adult)   Pulse 85   Temp 97.5 °F (36.4 °C) (Temporal)   Resp 16   Ht 157.5 cm (62.01\")   Wt 64 kg (141 lb)   SpO2 97%   BMI 25.78 kg/m²       Current Outpatient Medications:   •  cyclobenzaprine (FLEXERIL) 10 MG tablet, Take 1 tablet by mouth At Night As Needed for Muscle Spasms., Disp: 30 tablet, Rfl: 0  •  multivitamin with minerals " tablet tablet, Take 1 tablet by mouth Daily., Disp: , Rfl:   •  omeprazole (priLOSEC) 20 MG capsule, take 1 capsule (20 mg) by oral route once daily before a meal for 30 days, Disp: 30 capsule, Rfl: 5  •  potassium chloride 10 MEQ CR tablet, Take 1 tablet by mouth 2 (Two) Times a Day., Disp: 30 tablet, Rfl: 0    Objective   Physical Exam  Constitutional:       General: She is not in acute distress.     Appearance: Normal appearance. She is well-developed. She is not ill-appearing or diaphoretic.      Comments: Patient is in no distress, patient has normal voice and speech.  Normal respiratory effort.   HENT:      Head: Normocephalic and atraumatic.   Pulmonary:      Effort: Pulmonary effort is normal.   Musculoskeletal:      Cervical back: Normal range of motion and neck supple.   Skin:     Comments: There is petechial rash noted on both forearms and both shin areas.   Neurological:      General: No focal deficit present.      Mental Status: She is alert and oriented to person, place, and time. Mental status is at baseline.   Psychiatric:         Mood and Affect: Mood normal.         FOLLOWING LABS WERE REVIEWED TODAY:  CBC    CBC 10/22/21 7/12/22   WBC 4.95 6.38   RBC 4.65 4.66   Hemoglobin 13.3 13.4   Hematocrit 40.4 39.4   MCV 86.9 84.5   MCH 28.6 28.8   MCHC 32.9 34.0   RDW 13.5 13.7   Platelets [id number]                 Assessment & Plan   Diagnoses and all orders for this visit:    1. Petechial rash (Primary)  -     CBC Auto Differential  -     Comprehensive Metabolic Panel  -     TSH  -     Tick Panel - Blood, Blood, Venous Line; Future  -     VEENA; Future  -     C-reactive Protein  -     Sedimentation Rate  -     Urinalysis With Culture If Indicated - Urine, Clean Catch    2. Muscle cramp, nocturnal  -     Magnesium  -     cyclobenzaprine (FLEXERIL) 10 MG tablet; Take 1 tablet by mouth At Night As Needed for Muscle Spasms.  Dispense: 30 tablet; Refill: 0      I reviewed her symptoms and concerns.  I will be  getting labs today to evaluate further her petechial rash.  She was also advised to schedule a follow-up appointment with her rheumatologist to address this.  I will advise further once the results of the blood work are available.          Return if symptoms worsen or fail to improve, for Recheck.    Requested Prescriptions     Signed Prescriptions Disp Refills   • cyclobenzaprine (FLEXERIL) 10 MG tablet 30 tablet 0     Sig: Take 1 tablet by mouth At Night As Needed for Muscle Spasms.

## 2022-09-13 ENCOUNTER — LAB (OUTPATIENT)
Dept: LAB | Facility: HOSPITAL | Age: 62
End: 2022-09-13

## 2022-09-13 DIAGNOSIS — R23.3 PETECHIAL RASH: ICD-10-CM

## 2022-09-13 LAB
ALBUMIN SERPL-MCNC: 4.8 G/DL (ref 3.5–5.2)
ALBUMIN/GLOB SERPL: 1.5 G/DL
ALP SERPL-CCNC: 73 U/L (ref 39–117)
ALT SERPL W P-5'-P-CCNC: 21 U/L (ref 1–33)
ANION GAP SERPL CALCULATED.3IONS-SCNC: 10.5 MMOL/L (ref 5–15)
AST SERPL-CCNC: 19 U/L (ref 1–32)
BASOPHILS # BLD AUTO: 0.06 10*3/MM3 (ref 0–0.2)
BASOPHILS NFR BLD AUTO: 0.9 % (ref 0–1.5)
BILIRUB SERPL-MCNC: 0.4 MG/DL (ref 0–1.2)
BILIRUB UR QL STRIP: NEGATIVE
BUN SERPL-MCNC: 15 MG/DL (ref 8–23)
BUN/CREAT SERPL: 15.8 (ref 7–25)
CALCIUM SPEC-SCNC: 9.9 MG/DL (ref 8.6–10.5)
CHLORIDE SERPL-SCNC: 102 MMOL/L (ref 98–107)
CLARITY UR: CLEAR
CO2 SERPL-SCNC: 27.5 MMOL/L (ref 22–29)
COLOR UR: YELLOW
CREAT SERPL-MCNC: 0.95 MG/DL (ref 0.57–1)
CRP SERPL-MCNC: <0.3 MG/DL (ref 0–0.5)
DEPRECATED RDW RBC AUTO: 44.8 FL (ref 37–54)
EGFRCR SERPLBLD CKD-EPI 2021: 68.3 ML/MIN/1.73
EOSINOPHIL # BLD AUTO: 0.13 10*3/MM3 (ref 0–0.4)
EOSINOPHIL NFR BLD AUTO: 2 % (ref 0.3–6.2)
ERYTHROCYTE [DISTWIDTH] IN BLOOD BY AUTOMATED COUNT: 14.1 % (ref 12.3–15.4)
ERYTHROCYTE [SEDIMENTATION RATE] IN BLOOD: 23 MM/HR (ref 0–30)
GLOBULIN UR ELPH-MCNC: 3.3 GM/DL
GLUCOSE SERPL-MCNC: 102 MG/DL (ref 65–99)
GLUCOSE UR STRIP-MCNC: NEGATIVE MG/DL
HCT VFR BLD AUTO: 41.7 % (ref 34–46.6)
HGB BLD-MCNC: 13.4 G/DL (ref 12–15.9)
HGB UR QL STRIP.AUTO: NEGATIVE
IMM GRANULOCYTES # BLD AUTO: 0.03 10*3/MM3 (ref 0–0.05)
IMM GRANULOCYTES NFR BLD AUTO: 0.5 % (ref 0–0.5)
KETONES UR QL STRIP: NEGATIVE
LEUKOCYTE ESTERASE UR QL STRIP.AUTO: NEGATIVE
LYMPHOCYTES # BLD AUTO: 1.85 10*3/MM3 (ref 0.7–3.1)
LYMPHOCYTES NFR BLD AUTO: 28.9 % (ref 19.6–45.3)
MAGNESIUM SERPL-MCNC: 2.4 MG/DL (ref 1.6–2.4)
MCH RBC QN AUTO: 28.1 PG (ref 26.6–33)
MCHC RBC AUTO-ENTMCNC: 32.1 G/DL (ref 31.5–35.7)
MCV RBC AUTO: 87.4 FL (ref 79–97)
MONOCYTES # BLD AUTO: 0.51 10*3/MM3 (ref 0.1–0.9)
MONOCYTES NFR BLD AUTO: 8 % (ref 5–12)
NEUTROPHILS NFR BLD AUTO: 3.82 10*3/MM3 (ref 1.7–7)
NEUTROPHILS NFR BLD AUTO: 59.7 % (ref 42.7–76)
NITRITE UR QL STRIP: NEGATIVE
NRBC BLD AUTO-RTO: 0 /100 WBC (ref 0–0.2)
PH UR STRIP.AUTO: 6 [PH] (ref 5–8)
PLATELET # BLD AUTO: 335 10*3/MM3 (ref 140–450)
PMV BLD AUTO: 9.3 FL (ref 6–12)
POTASSIUM SERPL-SCNC: 4.5 MMOL/L (ref 3.5–5.2)
PROT SERPL-MCNC: 8.1 G/DL (ref 6–8.5)
PROT UR QL STRIP: NEGATIVE
RBC # BLD AUTO: 4.77 10*6/MM3 (ref 3.77–5.28)
SODIUM SERPL-SCNC: 140 MMOL/L (ref 136–145)
SP GR UR STRIP: 1.01 (ref 1–1.03)
TSH SERPL DL<=0.05 MIU/L-ACNC: 1.27 UIU/ML (ref 0.27–4.2)
UROBILINOGEN UR QL STRIP: NORMAL
WBC NRBC COR # BLD: 6.4 10*3/MM3 (ref 3.4–10.8)

## 2022-09-13 PROCEDURE — 86757 RICKETTSIA ANTIBODY: CPT

## 2022-09-13 PROCEDURE — 86235 NUCLEAR ANTIGEN ANTIBODY: CPT

## 2022-09-13 PROCEDURE — 86038 ANTINUCLEAR ANTIBODIES: CPT

## 2022-09-13 PROCEDURE — 86622 BRUCELLA ANTIBODY: CPT

## 2022-09-13 PROCEDURE — 81003 URINALYSIS AUTO W/O SCOPE: CPT | Performed by: FAMILY MEDICINE

## 2022-09-13 PROCEDURE — 86140 C-REACTIVE PROTEIN: CPT | Performed by: FAMILY MEDICINE

## 2022-09-13 PROCEDURE — 86225 DNA ANTIBODY NATIVE: CPT

## 2022-09-13 PROCEDURE — 86666 EHRLICHIA ANTIBODY: CPT

## 2022-09-13 PROCEDURE — 85652 RBC SED RATE AUTOMATED: CPT | Performed by: FAMILY MEDICINE

## 2022-09-13 PROCEDURE — 36415 COLL VENOUS BLD VENIPUNCTURE: CPT | Performed by: FAMILY MEDICINE

## 2022-09-13 PROCEDURE — 86618 LYME DISEASE ANTIBODY: CPT

## 2022-09-13 PROCEDURE — 83516 IMMUNOASSAY NONANTIBODY: CPT

## 2022-09-13 PROCEDURE — 80050 GENERAL HEALTH PANEL: CPT | Performed by: FAMILY MEDICINE

## 2022-09-13 PROCEDURE — 83735 ASSAY OF MAGNESIUM: CPT | Performed by: FAMILY MEDICINE

## 2022-09-14 LAB
ANA SER QL: POSITIVE
B BURGDOR IGG+IGM SER QL IA: NEGATIVE

## 2022-09-14 NOTE — PROGRESS NOTES
[facility] x1  to let us know if she has made a rheumatology appt.      Patient called back to say that she has an appointment at her rheumatologist on Friday

## 2022-09-15 LAB
CENTROMERE B AB SER-ACNC: <0.2 AI (ref 0–0.9)
CHROMATIN AB SERPL-ACNC: 0.5 AI (ref 0–0.9)
DSDNA AB SER-ACNC: 1 IU/ML (ref 0–9)
ENA JO1 AB SER-ACNC: <0.2 AI (ref 0–0.9)
ENA RNP AB SER-ACNC: 0.2 AI (ref 0–0.9)
ENA SCL70 AB SER-ACNC: <0.2 AI (ref 0–0.9)
ENA SM AB SER-ACNC: <0.2 AI (ref 0–0.9)
ENA SM+RNP AB SER-ACNC: <0.2 AI (ref 0–0.9)
ENA SS-A AB SER-ACNC: <0.2 AI (ref 0–0.9)
ENA SS-B AB SER-ACNC: <0.2 AI (ref 0–0.9)
Lab: NORMAL
RIBOSOMAL P AB SER-ACNC: <0.2 AI (ref 0–0.9)

## 2022-09-16 LAB
A PHAGOCYTOPH IGG TITR SER IF: NEGATIVE {TITER}
A PHAGOCYTOPH IGM TITR SER IF: NEGATIVE {TITER}
BRUCELLA IGG SER QL IA: NEGATIVE
BRUCELLA IGM SER QL IA: POSITIVE
E CHAFFEENSIS IGG TITR SER IF: NEGATIVE {TITER}
E CHAFFEENSIS IGM TITR SER IF: NEGATIVE {TITER}
RICK SF IGG TITR SER IF: ABNORMAL {TITER}
RICK SF IGM TITR SER IF: ABNORMAL {TITER}
RICK TYPHUS IGG TITR SER IF: ABNORMAL {TITER}
RICK TYPHUS IGM TITR SER IF: ABNORMAL {TITER}

## 2022-09-16 RX ORDER — DOXYCYCLINE HYCLATE 100 MG
100 TABLET ORAL 2 TIMES DAILY
Qty: 28 TABLET | Refills: 0 | Status: SHIPPED | OUTPATIENT
Start: 2022-09-16 | End: 2022-09-30

## 2022-09-19 ENCOUNTER — TELEPHONE (OUTPATIENT)
Dept: FAMILY MEDICINE CLINIC | Facility: CLINIC | Age: 62
End: 2022-09-19

## 2022-09-19 DIAGNOSIS — R23.3 PETECHIAL RASH: Primary | ICD-10-CM

## 2022-09-19 NOTE — TELEPHONE ENCOUNTER
Caller: Ela Paulino    Relationship: Self    Best call back number: [phone]    What is the medical concern/diagnosis: MARKS ALL OVER ARMS AND LEGS    What specialty or service is being requested: CANCER AND BLOOD DR    What is the provider, practice or medical service name: [facility]    Any additional details: PATIENT CALLED THEIR OFFICE TODAY AND THEY DO NOT HAVE A REFERRAL FOR HER YET, PLEASE ADVISE.

## 2022-09-21 ENCOUNTER — TELEPHONE (OUTPATIENT)
Dept: ONCOLOGY | Facility: CLINIC | Age: 62
End: 2022-09-21

## 2022-09-21 NOTE — TELEPHONE ENCOUNTER
Hub staff attempted to follow warm transfer process and was unsuccessful     Caller: Ela Paulino    Relationship to patient: Self    Best call back number: [phone]    Patient is needing: RETURNING [facility] CALL

## 2022-09-21 NOTE — TELEPHONE ENCOUNTER
Hub is instructed to read the documentation below to patient    RECEIVED MESSAGE FROM PATIENT REGARDING HEMATOLOGY REFERRAL.  ATTEMPTED TO CONTACT PATIENT.  NO ANSWER.  LMV ASKING PATIENT TO CALL BACK.

## 2022-09-22 NOTE — TELEPHONE ENCOUNTER
PATIENT RETURNED CALL TO [facility], NOT AVAILABLE. PLEASE CALL PATIENT -[phone], OR IF ANYONE ELSE CAN HELP PATIENT, SHE STATED THEY WERE PLAYING PHONE TAG ALL DAY YESTERDAY

## 2022-10-06 ENCOUNTER — LAB (OUTPATIENT)
Dept: LAB | Facility: HOSPITAL | Age: 62
End: 2022-10-06

## 2022-10-06 ENCOUNTER — CONSULT (OUTPATIENT)
Dept: ONCOLOGY | Facility: CLINIC | Age: 62
End: 2022-10-06

## 2022-10-06 VITALS
HEIGHT: 62 IN | SYSTOLIC BLOOD PRESSURE: 112 MMHG | DIASTOLIC BLOOD PRESSURE: 80 MMHG | HEART RATE: 78 BPM | RESPIRATION RATE: 18 BRPM | BODY MASS INDEX: 25.76 KG/M2 | WEIGHT: 140 LBS | TEMPERATURE: 96.8 F

## 2022-10-06 DIAGNOSIS — R23.3 PETECHIAL RASH: ICD-10-CM

## 2022-10-06 DIAGNOSIS — D68.61 ANTIPHOSPHOLIPID SYNDROME: ICD-10-CM

## 2022-10-06 DIAGNOSIS — R53.83 OTHER FATIGUE: ICD-10-CM

## 2022-10-06 DIAGNOSIS — R23.3 PETECHIAL RASH: Primary | ICD-10-CM

## 2022-10-06 LAB
ALBUMIN SERPL-MCNC: 4.6 G/DL (ref 3.5–5.2)
ALBUMIN/GLOB SERPL: 1.5 G/DL
ALP SERPL-CCNC: 73 U/L (ref 39–117)
ALT SERPL W P-5'-P-CCNC: 22 U/L (ref 1–33)
ANION GAP SERPL CALCULATED.3IONS-SCNC: 13 MMOL/L (ref 5–15)
APTT PPP: 29.3 SECONDS (ref 24–31)
AST SERPL-CCNC: 25 U/L (ref 1–32)
BASOPHILS # BLD AUTO: 0.05 10*3/MM3 (ref 0–0.2)
BASOPHILS NFR BLD AUTO: 0.6 % (ref 0–1.5)
BILIRUB SERPL-MCNC: 0.3 MG/DL (ref 0–1.2)
BUN SERPL-MCNC: 16 MG/DL (ref 8–23)
BUN/CREAT SERPL: 20 (ref 7–25)
CALCIUM SPEC-SCNC: 10.1 MG/DL (ref 8.6–10.5)
CHLORIDE SERPL-SCNC: 101 MMOL/L (ref 98–107)
CLOSURE TME COLL+ADP BLD: NORMAL S
CLOSURE TME COLL+EPINEP BLD: 99 SECONDS (ref 64–160)
CO2 SERPL-SCNC: 26 MMOL/L (ref 22–29)
CREAT SERPL-MCNC: 0.8 MG/DL (ref 0.57–1)
DEPRECATED RDW RBC AUTO: 46.5 FL (ref 37–54)
EGFRCR SERPLBLD CKD-EPI 2021: 83.9 ML/MIN/1.73
EOSINOPHIL # BLD AUTO: 0.15 10*3/MM3 (ref 0–0.4)
EOSINOPHIL NFR BLD AUTO: 1.9 % (ref 0.3–6.2)
ERYTHROCYTE [DISTWIDTH] IN BLOOD BY AUTOMATED COUNT: 14.7 % (ref 12.3–15.4)
FIBRINOGEN PPP-MCNC: 420 MG/DL (ref 210–450)
GLOBULIN UR ELPH-MCNC: 3.1 GM/DL
GLUCOSE SERPL-MCNC: 92 MG/DL (ref 65–99)
HCT VFR BLD AUTO: 44.8 % (ref 34–46.6)
HGB BLD-MCNC: 14.5 G/DL (ref 12–15.9)
INR PPP: 0.99 (ref 0.93–1.1)
LYMPHOCYTES # BLD AUTO: 2.73 10*3/MM3 (ref 0.7–3.1)
LYMPHOCYTES NFR BLD AUTO: 35.4 % (ref 19.6–45.3)
MCH RBC QN AUTO: 28.6 PG (ref 26.6–33)
MCHC RBC AUTO-ENTMCNC: 32.4 G/DL (ref 31.5–35.7)
MCV RBC AUTO: 88.4 FL (ref 79–97)
MONOCYTES # BLD AUTO: 0.78 10*3/MM3 (ref 0.1–0.9)
MONOCYTES NFR BLD AUTO: 10.1 % (ref 5–12)
NEUTROPHILS NFR BLD AUTO: 4.01 10*3/MM3 (ref 1.7–7)
NEUTROPHILS NFR BLD AUTO: 52 % (ref 42.7–76)
PLATELET # BLD AUTO: 156 10*3/MM3 (ref 140–450)
PMV BLD AUTO: 10.5 FL (ref 6–12)
POTASSIUM SERPL-SCNC: 3.8 MMOL/L (ref 3.5–5.2)
PROT SERPL-MCNC: 7.7 G/DL (ref 6–8.5)
PROTHROMBIN TIME: 10.2 SECONDS (ref 9.6–11.7)
RBC # BLD AUTO: 5.07 10*6/MM3 (ref 3.77–5.28)
SODIUM SERPL-SCNC: 140 MMOL/L (ref 136–145)
WBC NRBC COR # BLD: 7.72 10*3/MM3 (ref 3.4–10.8)

## 2022-10-06 PROCEDURE — 85384 FIBRINOGEN ACTIVITY: CPT | Performed by: INTERNAL MEDICINE

## 2022-10-06 PROCEDURE — 85610 PROTHROMBIN TIME: CPT | Performed by: INTERNAL MEDICINE

## 2022-10-06 PROCEDURE — 80053 COMPREHEN METABOLIC PANEL: CPT | Performed by: INTERNAL MEDICINE

## 2022-10-06 PROCEDURE — 36415 COLL VENOUS BLD VENIPUNCTURE: CPT | Performed by: INTERNAL MEDICINE

## 2022-10-06 PROCEDURE — 85025 COMPLETE CBC W/AUTO DIFF WBC: CPT

## 2022-10-06 PROCEDURE — 85730 THROMBOPLASTIN TIME PARTIAL: CPT | Performed by: INTERNAL MEDICINE

## 2022-10-06 PROCEDURE — 99204 OFFICE O/P NEW MOD 45 MIN: CPT | Performed by: INTERNAL MEDICINE

## 2022-10-06 PROCEDURE — 85576 BLOOD PLATELET AGGREGATION: CPT | Performed by: INTERNAL MEDICINE

## 2022-10-06 NOTE — PROGRESS NOTES
Hematology/Oncology Outpatient Consultation    Patient name: Ela Paulino  : 1960  MRN: [id number]  Primary Care Physician: Analy San MD  Referring Physician: Analy San MD  Reason For Consult:     Chief Complaint   Patient presents with   • Appointment     Petechial rash       History of Present Illness:    This is a 62-year-old female who developed rash on both upper extremities and lower extremities over the past several months.  The rash is mostly  on her hands and arms.  It comes and goes away.  It does not itch. Rash is red and not palpable.  She denies any bleeding issues such as gum bleeds, GI bleed or bleed in the urine.  She also denies any GYN bleed.  She was once diagnosed with rheumatoid arthritis and was on medication.  She does not take any NSAIDS    She denies any weight loss, she has fatigue.  Her fatigue fatigue has been going on for sometime    She had miscarriages at first trimester 3 times    There is family history of bleeding issues.    There is family history of autoimmune disease such as rheumatoid arthritis    Past Medical History:   Diagnosis Date   • Abnormal ECG    • Anemia     As a kid   • Arthritis    • Cholelithiasis    • Depression    • Hearing loss in right ear     Possible Menier's disease. Dr. Orr   • Low back pain    • Neck pain, acute    • Pituitary mass (HCC) 2006    [facility]; she gets MRI's every 2 years   • Positive VEENA (antinuclear antibody)        Past Surgical History:   Procedure Laterality Date   • COLONOSCOPY  2014    negative- 10 yr repeat   • CYST REMOVAL Right     wrist   • DILATATION AND CURETTAGE     • EYE SURGERY     • OTHER SURGICAL HISTORY      Laparoscopy   • VAGINAL DELIVERY       -  X3   • WISDOM TOOTH EXTRACTION           Current Outpatient Medications:   •  omeprazole (priLOSEC) 20 MG capsule, take 1 capsule (20 mg) by oral route once daily before a meal for 30 days, Disp: 30 capsule, Rfl: 5    Allergies    Allergen Reactions   • Codeine GI Intolerance and Nausea Only       Immunization History   Administered Date(s) Administered   • COVID-19 (PFIZER) PURPLE CAP 01/04/2021, 01/25/2021, 02/25/2021   • PPD Test 02/10/2017, 02/18/2019       Family History   Problem Relation Age of Onset   • Hypertension Mother    • Arthritis Mother    • Heart disease Mother    • Lung cancer Father    • Other Other         FH OF RA- SEVERAL FAMILY MEMBERS   • Heart disease Maternal Grandmother    • Mental illness Son         Autism   • Stroke Paternal Grandmother        Cancer-related family history includes Lung cancer in her father.    Social History     Tobacco Use   • Smoking status: Never Smoker   • Smokeless tobacco: Never Used   Vaping Use   • Vaping Use: Never used   Substance Use Topics   • Alcohol use: Not Currently   • Drug use: Never       ROS:    Review of Systems   Constitutional: Negative for chills, fatigue and fever.   HENT: Negative for congestion, drooling, ear discharge, rhinorrhea, sinus pressure and tinnitus.    Eyes: Negative for photophobia, pain and discharge.   Respiratory: Negative for apnea, choking and stridor.    Cardiovascular: Negative for palpitations.   Gastrointestinal: Negative for abdominal distention, abdominal pain and anal bleeding.   Endocrine: Negative for polydipsia and polyphagia.   Genitourinary: Negative for decreased urine volume, flank pain and genital sores.   Musculoskeletal: Negative for gait problem, neck pain and neck stiffness.   Skin: Positive for rash. Negative for color change and wound.   Neurological: Negative for tremors, seizures, syncope, facial asymmetry and speech difficulty.   Hematological: Negative for adenopathy.   Psychiatric/Behavioral: Negative for agitation, confusion, hallucinations and self-injury. The patient is not hyperactive.        Objective:    There were no vitals filed for this visit.  There is no height or weight on file to calculate BMI.    ECOG  (0)  Fully active, able to carry on all predisease performance without restriction    Physical Exam:  Physical Exam  Vitals and nursing note reviewed.   Constitutional:       General: She is not in acute distress.     Appearance: She is not diaphoretic.   HENT:      Head: Normocephalic and atraumatic.   Eyes:      General: No scleral icterus.        Right eye: No discharge.         Left eye: No discharge.      Conjunctiva/sclera: Conjunctivae normal.   Neck:      Thyroid: No thyromegaly.   Cardiovascular:      Rate and Rhythm: Normal rate and regular rhythm.      Heart sounds: Normal heart sounds.     No friction rub. No gallop.   Pulmonary:      Effort: Pulmonary effort is normal. No respiratory distress.      Breath sounds: No stridor. No wheezing.   Abdominal:      General: Bowel sounds are normal.      Palpations: Abdomen is soft. There is no mass.      Tenderness: There is no abdominal tenderness. There is no guarding or rebound.   Musculoskeletal:         General: No tenderness. Normal range of motion.      Cervical back: Normal range of motion and neck supple.   Lymphadenopathy:      Cervical: No cervical adenopathy.   Skin:     General: Skin is warm.      Findings: Rash present. No erythema.      Comments: On both forearms   Neurological:      Mental Status: She is alert and oriented to person, place, and time.      Motor: No abnormal muscle tone.   Psychiatric:         Behavior: Behavior normal.         RECENT LABS  WBC   Date Value Ref Range Status   10/06/2022 7.72 3.40 - 10.80 10*3/mm3 Final     RBC   Date Value Ref Range Status   10/06/2022 5.07 3.77 - 5.28 10*6/mm3 Final     Hemoglobin   Date Value Ref Range Status   10/06/2022 14.5 12.0 - 15.9 g/dL Final     Hematocrit   Date Value Ref Range Status   10/06/2022 44.8 34.0 - 46.6 % Final     MCV   Date Value Ref Range Status   10/06/2022 88.4 79.0 - 97.0 fL Final     MCH   Date Value Ref Range Status   10/06/2022 28.6 26.6 - 33.0 pg Final     MCHC   Date  Value Ref Range Status   10/06/2022 32.4 31.5 - 35.7 g/dL Final     RDW   Date Value Ref Range Status   10/06/2022 14.7 12.3 - 15.4 % Final     RDW-SD   Date Value Ref Range Status   10/06/2022 46.5 37.0 - 54.0 fl Final     MPV   Date Value Ref Range Status   10/06/2022 10.5 6.0 - 12.0 fL Final     Platelets   Date Value Ref Range Status   10/06/2022 156 140 - 450 10*3/mm3 Final     Neutrophil %   Date Value Ref Range Status   10/06/2022 52.0 42.7 - 76.0 % Final     Lymphocyte %   Date Value Ref Range Status   10/06/2022 35.4 19.6 - 45.3 % Final     Monocyte %   Date Value Ref Range Status   10/06/2022 10.1 5.0 - 12.0 % Final     Eosinophil %   Date Value Ref Range Status   10/06/2022 1.9 0.3 - 6.2 % Final     Basophil %   Date Value Ref Range Status   10/06/2022 0.6 0.0 - 1.5 % Final     Immature Grans %   Date Value Ref Range Status   09/13/2022 0.5 0.0 - 0.5 % Final     Neutrophils, Absolute   Date Value Ref Range Status   10/06/2022 4.01 1.70 - 7.00 10*3/mm3 Final     Lymphocytes, Absolute   Date Value Ref Range Status   10/06/2022 2.73 0.70 - 3.10 10*3/mm3 Final     Monocytes, Absolute   Date Value Ref Range Status   10/06/2022 0.78 0.10 - 0.90 10*3/mm3 Final     Eosinophils, Absolute   Date Value Ref Range Status   10/06/2022 0.15 0.00 - 0.40 10*3/mm3 Final     Basophils, Absolute   Date Value Ref Range Status   10/06/2022 0.05 0.00 - 0.20 10*3/mm3 Final     Immature Grans, Absolute   Date Value Ref Range Status   09/13/2022 0.03 0.00 - 0.05 10*3/mm3 Final     nRBC   Date Value Ref Range Status   09/13/2022 0.0 0.0 - 0.2 /100 WBC Final       Lab Results   Component Value Date    GLUCOSE 102 (H) 09/13/2022    BUN 15 09/13/2022    CREATININE 0.95 09/13/2022    EGFRIFNONA 71 10/22/2021    BCR 15.8 09/13/2022    K 4.5 09/13/2022    CO2 27.5 09/13/2022    CALCIUM 9.9 09/13/2022    ALBUMIN 4.80 09/13/2022    LABIL2 1.3 08/20/2018    AST 19 09/13/2022    ALT 21 09/13/2022         Assessment & Plan   Petechial rash  -  CBC & Differential      1. Petechial rash on both upper extremity with some ecchymotic areas.  Unclear etiology.  She has normal platelet counts.  She has no other signs of bleeding.  Reviewed the possibilities with her  2. She has no family history of any bleeding disorders  3. She has positive VEENA  4. Strong family history of autoimmune disease      Plans    · Will check von Willebrand studies PT PTT INR, fibrinogen level PFA-100 SPEP with ARLETH, thrombin time  · We will plan to see patient in approximately 3 to 4 weeks from now  · All questions answered    Patient verbalized understanding and is in agreement of the above plan.            I spent 45 total minutes, face-to-face, caring for Ela marmolejo.  90% of this time involved counseling and/or coordination of care as documented within this note.

## 2022-10-07 ENCOUNTER — PATIENT ROUNDING (BHMG ONLY) (OUTPATIENT)
Dept: ONCOLOGY | Facility: CLINIC | Age: 62
End: 2022-10-07

## 2022-10-07 LAB
ALBUMIN SERPL ELPH-MCNC: 4.2 G/DL (ref 2.9–4.4)
ALBUMIN/GLOB SERPL: 1.4 {RATIO} (ref 0.7–1.7)
ALPHA1 GLOB SERPL ELPH-MCNC: 0.3 G/DL (ref 0–0.4)
ALPHA2 GLOB SERPL ELPH-MCNC: 0.8 G/DL (ref 0.4–1)
B-GLOBULIN SERPL ELPH-MCNC: 1.1 G/DL (ref 0.7–1.3)
GAMMA GLOB SERPL ELPH-MCNC: 1 G/DL (ref 0.4–1.8)
GLOBULIN SER CALC-MCNC: 3.1 G/DL (ref 2.2–3.9)
LABORATORY COMMENT REPORT: NORMAL
M PROTEIN SERPL ELPH-MCNC: NORMAL G/DL
PROT PATTERN SERPL ELPH-IMP: NORMAL
PROT SERPL-MCNC: 7.3 G/DL (ref 6–8.5)

## 2022-10-07 NOTE — PROGRESS NOTES
October 7, 2022    Hello, may I speak with Ela Paulino?    My name is Zuleyka Brown      I am  with [facility] [address] [place] [address] [phone].    Before we get started may I verify your date of birth? 1960    I am calling to officially welcome you to our practice and ask about your recent visit. Is this a good time to talk? no    Tell me about your visit with us. What things went well?  A My Chart message was sent to the patient.       We're always looking for ways to make our patients' experiences even better. Do you have recommendations on ways we may improve?  no    Overall were you satisfied with your first visit to our practice? yes       I appreciate you taking the time to speak with me today. Is there anything else I can do for you? no      Thank you, and have a great day.

## 2022-10-08 LAB
FACT VIII ACT/NOR PPP: 111 % (ref 56–140)
PATH INTERP BLD-IMP: NORMAL
THROMBIN TIME: 16.4 SEC (ref 0–23)
VWF AG ACT/NOR PPP IA: 126 % (ref 50–200)
VWF:RCO ACT/NOR PPP PL AGG: 90 % (ref 50–200)

## 2022-10-26 ENCOUNTER — OFFICE VISIT (OUTPATIENT)
Dept: ONCOLOGY | Facility: CLINIC | Age: 62
End: 2022-10-26

## 2022-10-26 ENCOUNTER — LAB (OUTPATIENT)
Dept: LAB | Facility: HOSPITAL | Age: 62
End: 2022-10-26

## 2022-10-26 VITALS
WEIGHT: 135 LBS | TEMPERATURE: 98 F | DIASTOLIC BLOOD PRESSURE: 84 MMHG | HEIGHT: 62 IN | BODY MASS INDEX: 24.84 KG/M2 | RESPIRATION RATE: 18 BRPM | SYSTOLIC BLOOD PRESSURE: 117 MMHG | HEART RATE: 81 BPM

## 2022-10-26 DIAGNOSIS — R53.83 OTHER FATIGUE: ICD-10-CM

## 2022-10-26 DIAGNOSIS — R23.3 PETECHIAL RASH: Primary | ICD-10-CM

## 2022-10-26 DIAGNOSIS — D68.61 ANTIPHOSPHOLIPID SYNDROME: ICD-10-CM

## 2022-10-26 LAB
BASOPHILS # BLD AUTO: 0.08 10*3/MM3 (ref 0–0.2)
BASOPHILS NFR BLD AUTO: 1 % (ref 0–1.5)
DEPRECATED RDW RBC AUTO: 48.6 FL (ref 37–54)
EOSINOPHIL # BLD AUTO: 0.21 10*3/MM3 (ref 0–0.4)
EOSINOPHIL NFR BLD AUTO: 2.7 % (ref 0.3–6.2)
ERYTHROCYTE [DISTWIDTH] IN BLOOD BY AUTOMATED COUNT: 15 % (ref 12.3–15.4)
HCT VFR BLD AUTO: 45 % (ref 34–46.6)
HGB BLD-MCNC: 14.3 G/DL (ref 12–15.9)
HOLD SPECIMEN: NORMAL
HOLD SPECIMEN: NORMAL
LYMPHOCYTES # BLD AUTO: 2.69 10*3/MM3 (ref 0.7–3.1)
LYMPHOCYTES NFR BLD AUTO: 34.4 % (ref 19.6–45.3)
MCH RBC QN AUTO: 28.4 PG (ref 26.6–33)
MCHC RBC AUTO-ENTMCNC: 31.8 G/DL (ref 31.5–35.7)
MCV RBC AUTO: 89.5 FL (ref 79–97)
MONOCYTES # BLD AUTO: 0.68 10*3/MM3 (ref 0.1–0.9)
MONOCYTES NFR BLD AUTO: 8.7 % (ref 5–12)
NEUTROPHILS NFR BLD AUTO: 4.15 10*3/MM3 (ref 1.7–7)
NEUTROPHILS NFR BLD AUTO: 53.2 % (ref 42.7–76)
PLATELET # BLD AUTO: 398 10*3/MM3 (ref 140–450)
PMV BLD AUTO: 8.9 FL (ref 6–12)
RBC # BLD AUTO: 5.03 10*6/MM3 (ref 3.77–5.28)
T3FREE SERPL-MCNC: 2.76 PG/ML (ref 2–4.4)
T4 FREE SERPL-MCNC: 1.22 NG/DL (ref 0.93–1.7)
WBC NRBC COR # BLD: 7.81 10*3/MM3 (ref 3.4–10.8)
WHOLE BLOOD HOLD COAG: NORMAL

## 2022-10-26 PROCEDURE — 84481 FREE ASSAY (FT-3): CPT

## 2022-10-26 PROCEDURE — 84439 ASSAY OF FREE THYROXINE: CPT

## 2022-10-26 PROCEDURE — 85025 COMPLETE CBC W/AUTO DIFF WBC: CPT

## 2022-10-26 PROCEDURE — 99214 OFFICE O/P EST MOD 30 MIN: CPT | Performed by: INTERNAL MEDICINE

## 2022-10-26 PROCEDURE — 36415 COLL VENOUS BLD VENIPUNCTURE: CPT

## 2022-10-26 NOTE — PROGRESS NOTES
Hematology/Oncology Outpatient Follow Up    PATIENT NAME:Ela Paulino  :1960  MRN: [id number]  PRIMARY CARE PHYSICIAN: Analy San MD  REFERRING PHYSICIAN: Analy San MD    Chief Complaint   Patient presents with   • Follow-up     Petechial rash        HISTORY OF PRESENT ILLNESS:     This is a 62-year-old female who developed rash on both upper extremities and lower extremities over the past several months.  The rash is mostly  on her hands and arms.  It comes and goes away.  It does not itch. Rash is red and not palpable.  She denies any bleeding issues such as gum bleeds, GI bleed or bleed in the urine.  She also denies any GYN bleed.  She was once diagnosed with rheumatoid arthritis and was on medication.  She does not take any NSAIDS     She denies any weight loss, she has fatigue.  Her fatigue fatigue has been going on for sometime     She had miscarriages at first trimester 3 times     There is family history of bleeding issues.     There is family history of autoimmune disease such as rheumatoid arthritis      · 10/6/2020 [facility] patient had work-up for possible bleeding diastasis.  The von Willebrand factor antigen was normal, von Willebrand factor ristocetin assay was normal and factor VIII activity is normal.  These results are not consistent with a diagnosis of von Willebrand disease.  PFA-100 was normal at 99, CMP was normal PTT was normal at 29.3 PT was 10.2 and INR is 0.9, fibrinogen is normal at 420 thrombin time was normal at 16.4 seconds SPEP with ARLETH did not reveal any monoclonal protein     Past Medical History:   Diagnosis Date   • Abnormal ECG    • Anemia     As a kid   • Arthritis    • Cholelithiasis    • Depression    • Hearing loss in right ear     Possible Menier's disease. Dr. Orr   • Low back pain    • Neck pain, acute    • Pituitary mass (HCC) 2006    [facility]; she gets MRI's every 2 years   • Positive VEENA (antinuclear antibody)        Past Surgical History:    Procedure Laterality Date   • COLONOSCOPY  2014    negative- 10 yr repeat   • CYST REMOVAL Right     wrist   • DILATATION AND CURETTAGE     • EYE SURGERY     • OTHER SURGICAL HISTORY      Laparoscopy   • VAGINAL DELIVERY       -  X3   • WISDOM TOOTH EXTRACTION           Current Outpatient Medications:   •  omeprazole (priLOSEC) 20 MG capsule, take 1 capsule (20 mg) by oral route once daily before a meal for 30 days, Disp: 30 capsule, Rfl: 5    Allergies   Allergen Reactions   • Codeine GI Intolerance and Nausea Only       Family History   Problem Relation Age of Onset   • Hypertension Mother    • Arthritis Mother    • Heart disease Mother    • Lung cancer Father    • Other Other         FH OF RA- SEVERAL FAMILY MEMBERS   • Heart disease Maternal Grandmother    • Mental illness Son         Autism   • Stroke Paternal Grandmother        Cancer-related family history includes Lung cancer in her father.    Social History     Tobacco Use   • Smoking status: Never   • Smokeless tobacco: Never   Vaping Use   • Vaping Use: Never used   Substance Use Topics   • Alcohol use: Not Currently   • Drug use: Never       I have reviewed and confirmed the accuracy of the patient's history: Chief complaint, HPI, ROS and Subjective as entered by the MA/LPN/RN. Juliana Gaming MD 10/28/22     SUBJECTIVE:    Patient is here today for routine follow-up.  She continues to experience petechial rash on both upper extremities.          REVIEW OF SYSTEMS:  Review of Systems   Constitutional: Negative for chills, fatigue and fever.   HENT: Negative for congestion, drooling, ear discharge, rhinorrhea, sinus pressure and tinnitus.    Eyes: Negative for photophobia, pain and discharge.   Respiratory: Negative for apnea, choking and stridor.    Cardiovascular: Negative for palpitations.   Gastrointestinal: Negative for abdominal distention, abdominal pain and anal bleeding.   Endocrine: Negative for polydipsia and polyphagia.  "  Genitourinary: Negative for decreased urine volume, flank pain and genital sores.   Musculoskeletal: Negative for gait problem, neck pain and neck stiffness.   Skin: Negative for color change, rash and wound.   Neurological: Negative for tremors, seizures, syncope, facial asymmetry and speech difficulty.   Hematological: Negative for adenopathy.   Psychiatric/Behavioral: Negative for agitation, confusion, hallucinations and self-injury. The patient is not hyperactive.        OBJECTIVE:    Vitals:    10/26/22 1445   BP: 117/84   Pulse: 81   Resp: 18   Temp: 98 °F (36.7 °C)   TempSrc: Infrared   Weight: 61.2 kg (135 lb)   Height: 157.5 cm (62\")   PainSc: 0-No pain     Body mass index is 24.69 kg/m².    ECOG  (0) Fully active, able to carry on all predisease performance without restriction    Physical Exam  Vitals and nursing note reviewed.   Constitutional:       General: She is not in acute distress.     Appearance: She is not diaphoretic.   HENT:      Head: Normocephalic and atraumatic.   Eyes:      General: No scleral icterus.        Right eye: No discharge.         Left eye: No discharge.      Conjunctiva/sclera: Conjunctivae normal.   Neck:      Thyroid: No thyromegaly.   Cardiovascular:      Rate and Rhythm: Normal rate and regular rhythm.      Heart sounds: Normal heart sounds.     No friction rub. No gallop.   Pulmonary:      Effort: Pulmonary effort is normal. No respiratory distress.      Breath sounds: No stridor. No wheezing.   Abdominal:      General: Bowel sounds are normal.      Palpations: Abdomen is soft. There is no mass.      Tenderness: There is no abdominal tenderness. There is no guarding or rebound.   Musculoskeletal:         General: No tenderness. Normal range of motion.      Cervical back: Normal range of motion and neck supple.   Lymphadenopathy:      Cervical: No cervical adenopathy.   Skin:     General: Skin is warm.      Findings: No erythema or rash.   Neurological:      Mental Status: " She is alert and oriented to person, place, and time.      Motor: No abnormal muscle tone.   Psychiatric:         Behavior: Behavior normal.         RECENT LABS  WBC   Date Value Ref Range Status   10/26/2022 7.81 3.40 - 10.80 10*3/mm3 Final     RBC   Date Value Ref Range Status   10/26/2022 5.03 3.77 - 5.28 10*6/mm3 Final     Hemoglobin   Date Value Ref Range Status   10/26/2022 14.3 12.0 - 15.9 g/dL Final     Hematocrit   Date Value Ref Range Status   10/26/2022 45.0 34.0 - 46.6 % Final     MCV   Date Value Ref Range Status   10/26/2022 89.5 79.0 - 97.0 fL Final     MCH   Date Value Ref Range Status   10/26/2022 28.4 26.6 - 33.0 pg Final     MCHC   Date Value Ref Range Status   10/26/2022 31.8 31.5 - 35.7 g/dL Final     RDW   Date Value Ref Range Status   10/26/2022 15.0 12.3 - 15.4 % Final     RDW-SD   Date Value Ref Range Status   10/26/2022 48.6 37.0 - 54.0 fl Final     MPV   Date Value Ref Range Status   10/26/2022 8.9 6.0 - 12.0 fL Final     Platelets   Date Value Ref Range Status   10/26/2022 398 140 - 450 10*3/mm3 Final     Neutrophil %   Date Value Ref Range Status   10/26/2022 53.2 42.7 - 76.0 % Final     Lymphocyte %   Date Value Ref Range Status   10/26/2022 34.4 19.6 - 45.3 % Final     Monocyte %   Date Value Ref Range Status   10/26/2022 8.7 5.0 - 12.0 % Final     Eosinophil %   Date Value Ref Range Status   10/26/2022 2.7 0.3 - 6.2 % Final     Basophil %   Date Value Ref Range Status   10/26/2022 1.0 0.0 - 1.5 % Final     Immature Grans %   Date Value Ref Range Status   09/13/2022 0.5 0.0 - 0.5 % Final     Neutrophils, Absolute   Date Value Ref Range Status   10/26/2022 4.15 1.70 - 7.00 10*3/mm3 Final     Lymphocytes, Absolute   Date Value Ref Range Status   10/26/2022 2.69 0.70 - 3.10 10*3/mm3 Final     Monocytes, Absolute   Date Value Ref Range Status   10/26/2022 0.68 0.10 - 0.90 10*3/mm3 Final     Eosinophils, Absolute   Date Value Ref Range Status   10/26/2022 0.21 0.00 - 0.40 10*3/mm3 Final      Basophils, Absolute   Date Value Ref Range Status   10/26/2022 0.08 0.00 - 0.20 10*3/mm3 Final     Immature Grans, Absolute   Date Value Ref Range Status   09/13/2022 0.03 0.00 - 0.05 10*3/mm3 Final     nRBC   Date Value Ref Range Status   09/13/2022 0.0 0.0 - 0.2 /100 WBC Final       Lab Results   Component Value Date    GLUCOSE 92 10/06/2022    BUN 16 10/06/2022    CREATININE 0.80 10/06/2022    EGFRIFNONA 71 10/22/2021    BCR 20.0 10/06/2022    K 3.8 10/06/2022    CO2 26.0 10/06/2022    CALCIUM 10.1 10/06/2022    PROTENTOTREF 7.3 10/06/2022    ALBUMIN 4.60 10/06/2022    ALBUMIN 4.2 10/06/2022    LABIL2 1.4 10/06/2022    AST 25 10/06/2022    ALT 22 10/06/2022         Assessment & Plan     Petechial rash  - CBC & Differential  - T3, Free  - T4, Free  - Factor XIII Activity    Antiphospholipid syndrome (HCC)  - CBC & Differential  - T3, Free  - T4, Free  - Factor XIII Activity    Other fatigue  - T3, Free  - T4, Free  - Factor XIII Activity      ASSESSMENT:      Petechial rash  - CBC & Differential        1. Petechial rash on both upper extremity with some ecchymotic areas.  Unclear etiology.  She has normal platelet counts.  She has no other signs of bleeding.  Reviewed the possibilities with her so far her work-up is essentially unremarkable.  Trial of vitamin C recommended.  Also will check factor XII activity  2. She has no family history of any bleeding disorders: Reviewed  3. She has positive VEENA.  Patient  encouraged to follow-up with her rheumatologist  4. Strong family history of autoimmune disease        Plans     • Checked von Willebrand studies PT PTT INR, fibrinogen level PFA-100 SPEP with ARLETH, thrombin time and all are normal  • We will plan to see patient in approximately 3 months  • Begin trial of vitamin C supplements  • Check free T3 and T4 and factor XII activity today  • All questions answered     Patient verbalized understanding and is in agreement of the above plan.         I spent 30 total  minutes, face-to-face, caring for Ela today.  90% of this time involved counseling and/or coordination of care as documented within this note.

## 2022-11-01 LAB — FACT XIII ACT/NOR PPP CHRO: 170 %

## 2022-11-08 DIAGNOSIS — R23.3 PETECHIAL RASH: Primary | ICD-10-CM

## 2023-04-05 NOTE — TELEPHONE ENCOUNTER
Caller: Ela Paulino    Relationship: Self    Best call back number: [phone]    What is the best time to reach you: TODAY AFTER 3:30 WILL BE NEXT DOOR FOR APPOINTMENT AT 2:45 AND DIDN'T KNOW IF SHE WANTED HER TO SWING BY.    Who are you requesting to speak with (clinical staff, provider,  specific staff member): SHAKEEL    What was the call regarding:  CALLED FOR LAB RESULTS.     AND CALLING TO GET SPECIFICS ON HER REFERRAL    Do you require a callback: YES           yes

## 2023-04-10 ENCOUNTER — TELEPHONE (OUTPATIENT)
Dept: FAMILY MEDICINE CLINIC | Facility: CLINIC | Age: 63
End: 2023-04-10

## 2023-04-10 RX ORDER — FLUCONAZOLE 150 MG/1
150 TABLET ORAL ONCE
Qty: 1 TABLET | Refills: 0 | Status: SHIPPED | OUTPATIENT
Start: 2023-04-10 | End: 2023-04-11

## 2023-04-10 NOTE — TELEPHONE ENCOUNTER
Caller: Ela Paulino    Relationship: Self    Best call back number:[phone]    What medication are you requesting: MEDICATION FOR UTI    What are your current symptoms: VAGINAL ITCHING, JUST FINISHED AN ANTIBIOTIC FOR SINUS INFECTION    How long have you been experiencing symptoms:2 DAYS    Have you had these symptoms before:    [x] Yes  [] No    Have you been treated for these symptoms before:   [] Yes  [] No    If a prescription is needed, what is your preferred pharmacy and phone number: [facility]     Additional notes:

## 2023-04-10 NOTE — TELEPHONE ENCOUNTER
Please advise the patient that I sent prescription for Diflucan to treat a possible vaginal yeast infection.  Thank you.

## 2023-07-14 ENCOUNTER — TELEPHONE (OUTPATIENT)
Dept: FAMILY MEDICINE CLINIC | Facility: CLINIC | Age: 63
End: 2023-07-14

## 2023-07-20 ENCOUNTER — LAB (OUTPATIENT)
Dept: LAB | Facility: HOSPITAL | Age: 63
End: 2023-07-20
Payer: COMMERCIAL

## 2023-07-20 DIAGNOSIS — R29.898 WEAKNESS OF BOTH LOWER EXTREMITIES: ICD-10-CM

## 2023-07-20 LAB — CHROMATIN AB SERPL-ACNC: <10 IU/ML (ref 0–14)

## 2023-07-20 PROCEDURE — 85652 RBC SED RATE AUTOMATED: CPT | Performed by: FAMILY MEDICINE

## 2023-07-20 PROCEDURE — 86235 NUCLEAR ANTIGEN ANTIBODY: CPT

## 2023-07-20 PROCEDURE — 86140 C-REACTIVE PROTEIN: CPT | Performed by: FAMILY MEDICINE

## 2023-07-20 PROCEDURE — 86038 ANTINUCLEAR ANTIBODIES: CPT

## 2023-07-20 PROCEDURE — 83735 ASSAY OF MAGNESIUM: CPT | Performed by: FAMILY MEDICINE

## 2023-07-20 PROCEDURE — 83516 IMMUNOASSAY NONANTIBODY: CPT

## 2023-07-20 PROCEDURE — 80053 COMPREHEN METABOLIC PANEL: CPT | Performed by: FAMILY MEDICINE

## 2023-07-20 PROCEDURE — 86431 RHEUMATOID FACTOR QUANT: CPT

## 2023-07-20 PROCEDURE — 86200 CCP ANTIBODY: CPT

## 2023-07-20 PROCEDURE — 36415 COLL VENOUS BLD VENIPUNCTURE: CPT

## 2023-07-20 PROCEDURE — 86225 DNA ANTIBODY NATIVE: CPT

## 2023-07-20 PROCEDURE — 84443 ASSAY THYROID STIM HORMONE: CPT | Performed by: FAMILY MEDICINE

## 2023-07-21 LAB
ANA SER QL: POSITIVE
CCP IGA+IGG SERPL IA-ACNC: 6 UNITS (ref 0–19)

## 2023-07-24 LAB
CENTROMERE B AB SER-ACNC: <0.2 AI (ref 0–0.9)
CHROMATIN AB SERPL-ACNC: 0.2 AI (ref 0–0.9)
DSDNA AB SER-ACNC: 1 IU/ML (ref 0–9)
ENA JO1 AB SER-ACNC: <0.2 AI (ref 0–0.9)
ENA RNP AB SER-ACNC: 0.4 AI (ref 0–0.9)
ENA SCL70 AB SER-ACNC: <0.2 AI (ref 0–0.9)
ENA SM AB SER-ACNC: <0.2 AI (ref 0–0.9)
ENA SM+RNP AB SER-ACNC: <0.2 AI (ref 0–0.9)
ENA SS-A AB SER-ACNC: <0.2 AI (ref 0–0.9)
ENA SS-B AB SER-ACNC: <0.2 AI (ref 0–0.9)
Lab: NORMAL
RIBOSOMAL P AB SER-ACNC: <0.2 AI (ref 0–0.9)

## 2023-07-24 RX ORDER — OMEPRAZOLE 20 MG/1
CAPSULE, DELAYED RELEASE ORAL
Qty: 30 CAPSULE | Refills: 5 | Status: CANCELLED | OUTPATIENT
Start: 2023-07-22

## 2023-07-25 NOTE — PROGRESS NOTES
I called the patient and LM to return My call and that I also sent her a MY Chart message and she may respond that way as well

## 2023-07-26 RX ORDER — OMEPRAZOLE 20 MG/1
CAPSULE, DELAYED RELEASE ORAL
Qty: 30 CAPSULE | Refills: 5 | Status: CANCELLED | OUTPATIENT
Start: 2023-07-22

## 2023-07-28 RX ORDER — OMEPRAZOLE 20 MG/1
CAPSULE, DELAYED RELEASE ORAL
Qty: 30 CAPSULE | Refills: 5 | Status: CANCELLED | OUTPATIENT
Start: 2023-07-22

## 2023-07-31 RX ORDER — OMEPRAZOLE 20 MG/1
CAPSULE, DELAYED RELEASE ORAL
Qty: 30 CAPSULE | Refills: 5 | Status: CANCELLED | OUTPATIENT
Start: 2023-07-22

## 2023-08-02 RX ORDER — OMEPRAZOLE 20 MG/1
CAPSULE, DELAYED RELEASE ORAL
Qty: 30 CAPSULE | Refills: 5 | Status: CANCELLED | OUTPATIENT
Start: 2023-07-22

## 2023-08-04 RX ORDER — OMEPRAZOLE 20 MG/1
CAPSULE, DELAYED RELEASE ORAL
Qty: 30 CAPSULE | Refills: 5 | Status: CANCELLED | OUTPATIENT
Start: 2023-07-22

## 2023-08-07 RX ORDER — OMEPRAZOLE 20 MG/1
CAPSULE, DELAYED RELEASE ORAL
Qty: 30 CAPSULE | Refills: 5 | Status: CANCELLED | OUTPATIENT
Start: 2023-07-22

## 2023-08-09 RX ORDER — OMEPRAZOLE 20 MG/1
CAPSULE, DELAYED RELEASE ORAL
Qty: 30 CAPSULE | Refills: 5 | Status: CANCELLED | OUTPATIENT
Start: 2023-07-22

## 2023-08-11 RX ORDER — OMEPRAZOLE 20 MG/1
CAPSULE, DELAYED RELEASE ORAL
Qty: 30 CAPSULE | Refills: 5 | Status: CANCELLED | OUTPATIENT
Start: 2023-07-22

## 2023-08-14 RX ORDER — OMEPRAZOLE 20 MG/1
CAPSULE, DELAYED RELEASE ORAL
Qty: 30 CAPSULE | Refills: 5 | Status: CANCELLED | OUTPATIENT
Start: 2023-07-22

## 2023-08-16 RX ORDER — OMEPRAZOLE 20 MG/1
CAPSULE, DELAYED RELEASE ORAL
Qty: 30 CAPSULE | Refills: 5 | Status: CANCELLED | OUTPATIENT
Start: 2023-07-22

## 2023-08-18 RX ORDER — OMEPRAZOLE 20 MG/1
CAPSULE, DELAYED RELEASE ORAL
Qty: 30 CAPSULE | Refills: 5 | Status: CANCELLED | OUTPATIENT
Start: 2023-07-22

## 2023-08-21 RX ORDER — OMEPRAZOLE 20 MG/1
CAPSULE, DELAYED RELEASE ORAL
Qty: 30 CAPSULE | Refills: 5 | Status: CANCELLED | OUTPATIENT
Start: 2023-07-22

## 2023-08-22 RX ORDER — OMEPRAZOLE 20 MG/1
CAPSULE, DELAYED RELEASE ORAL
Qty: 30 CAPSULE | Refills: 5 | Status: CANCELLED | OUTPATIENT
Start: 2023-07-22

## 2023-08-24 RX ORDER — OMEPRAZOLE 20 MG/1
CAPSULE, DELAYED RELEASE ORAL
Qty: 30 CAPSULE | Refills: 5 | Status: CANCELLED | OUTPATIENT
Start: 2023-07-22

## 2023-08-28 RX ORDER — OMEPRAZOLE 20 MG/1
CAPSULE, DELAYED RELEASE ORAL
Qty: 30 CAPSULE | Refills: 5 | Status: CANCELLED | OUTPATIENT
Start: 2023-07-22

## 2023-08-29 RX ORDER — SCOLOPAMINE TRANSDERMAL SYSTEM 1 MG/1
1 PATCH, EXTENDED RELEASE TRANSDERMAL
Qty: 4 PATCH | Refills: 0 | Status: SHIPPED | OUTPATIENT
Start: 2023-08-29

## 2023-08-30 RX ORDER — OMEPRAZOLE 20 MG/1
CAPSULE, DELAYED RELEASE ORAL
Qty: 30 CAPSULE | Refills: 5 | Status: CANCELLED | OUTPATIENT
Start: 2023-07-22

## 2023-09-01 RX ORDER — OMEPRAZOLE 20 MG/1
CAPSULE, DELAYED RELEASE ORAL
Qty: 30 CAPSULE | Refills: 5 | Status: CANCELLED | OUTPATIENT
Start: 2023-07-22

## 2023-09-06 RX ORDER — OMEPRAZOLE 20 MG/1
CAPSULE, DELAYED RELEASE ORAL
Qty: 30 CAPSULE | Refills: 5 | Status: CANCELLED | OUTPATIENT
Start: 2023-07-22

## 2023-09-08 RX ORDER — OMEPRAZOLE 20 MG/1
CAPSULE, DELAYED RELEASE ORAL
Qty: 30 CAPSULE | Refills: 5 | Status: CANCELLED | OUTPATIENT
Start: 2023-07-22

## 2023-09-11 RX ORDER — OMEPRAZOLE 20 MG/1
CAPSULE, DELAYED RELEASE ORAL
Qty: 30 CAPSULE | Refills: 5 | Status: CANCELLED | OUTPATIENT
Start: 2023-07-22

## 2023-09-13 RX ORDER — OMEPRAZOLE 20 MG/1
CAPSULE, DELAYED RELEASE ORAL
Qty: 30 CAPSULE | Refills: 5 | Status: CANCELLED | OUTPATIENT
Start: 2023-07-22

## 2023-09-15 RX ORDER — OMEPRAZOLE 20 MG/1
CAPSULE, DELAYED RELEASE ORAL
Qty: 30 CAPSULE | Refills: 5 | Status: CANCELLED | OUTPATIENT
Start: 2023-07-22

## 2023-09-18 RX ORDER — OMEPRAZOLE 20 MG/1
CAPSULE, DELAYED RELEASE ORAL
Qty: 30 CAPSULE | Refills: 5 | Status: CANCELLED | OUTPATIENT
Start: 2023-07-22

## 2023-09-20 RX ORDER — OMEPRAZOLE 20 MG/1
CAPSULE, DELAYED RELEASE ORAL
Qty: 30 CAPSULE | Refills: 5 | Status: CANCELLED | OUTPATIENT
Start: 2023-07-22

## 2023-09-22 RX ORDER — OMEPRAZOLE 20 MG/1
CAPSULE, DELAYED RELEASE ORAL
Qty: 30 CAPSULE | Refills: 5 | Status: CANCELLED | OUTPATIENT
Start: 2023-07-22

## 2023-09-25 RX ORDER — OMEPRAZOLE 20 MG/1
CAPSULE, DELAYED RELEASE ORAL
Qty: 30 CAPSULE | Refills: 5 | Status: CANCELLED | OUTPATIENT
Start: 2023-07-22

## 2023-09-28 RX ORDER — OMEPRAZOLE 20 MG/1
CAPSULE, DELAYED RELEASE ORAL
Qty: 30 CAPSULE | Refills: 5 | Status: CANCELLED | OUTPATIENT
Start: 2023-07-22

## 2023-10-02 RX ORDER — OMEPRAZOLE 20 MG/1
CAPSULE, DELAYED RELEASE ORAL
Qty: 30 CAPSULE | Refills: 5 | Status: CANCELLED | OUTPATIENT
Start: 2023-07-22

## 2023-10-04 RX ORDER — OMEPRAZOLE 20 MG/1
CAPSULE, DELAYED RELEASE ORAL
Qty: 30 CAPSULE | Refills: 5 | Status: CANCELLED | OUTPATIENT
Start: 2023-07-22

## 2023-10-06 RX ORDER — OMEPRAZOLE 20 MG/1
CAPSULE, DELAYED RELEASE ORAL
Qty: 30 CAPSULE | Refills: 5 | Status: CANCELLED | OUTPATIENT
Start: 2023-07-22

## 2023-10-09 RX ORDER — OMEPRAZOLE 20 MG/1
CAPSULE, DELAYED RELEASE ORAL
Qty: 30 CAPSULE | Refills: 5 | Status: CANCELLED | OUTPATIENT
Start: 2023-07-22

## 2023-10-11 RX ORDER — OMEPRAZOLE 20 MG/1
CAPSULE, DELAYED RELEASE ORAL
Qty: 30 CAPSULE | Refills: 5 | Status: CANCELLED | OUTPATIENT
Start: 2023-07-22

## 2023-10-13 RX ORDER — OMEPRAZOLE 20 MG/1
CAPSULE, DELAYED RELEASE ORAL
Qty: 30 CAPSULE | Refills: 5 | Status: CANCELLED | OUTPATIENT
Start: 2023-07-22

## 2023-10-16 RX ORDER — OMEPRAZOLE 20 MG/1
CAPSULE, DELAYED RELEASE ORAL
Qty: 30 CAPSULE | Refills: 5 | Status: CANCELLED | OUTPATIENT
Start: 2023-07-22

## 2023-10-18 RX ORDER — OMEPRAZOLE 20 MG/1
CAPSULE, DELAYED RELEASE ORAL
Qty: 30 CAPSULE | Refills: 5 | Status: CANCELLED | OUTPATIENT
Start: 2023-07-22

## 2023-10-20 RX ORDER — OMEPRAZOLE 20 MG/1
CAPSULE, DELAYED RELEASE ORAL
Qty: 30 CAPSULE | Refills: 5 | Status: CANCELLED | OUTPATIENT
Start: 2023-07-22

## 2023-10-23 RX ORDER — OMEPRAZOLE 20 MG/1
CAPSULE, DELAYED RELEASE ORAL
Qty: 30 CAPSULE | Refills: 5 | Status: CANCELLED | OUTPATIENT
Start: 2023-07-22

## 2023-10-25 RX ORDER — OMEPRAZOLE 20 MG/1
CAPSULE, DELAYED RELEASE ORAL
Qty: 30 CAPSULE | Refills: 5 | Status: CANCELLED | OUTPATIENT
Start: 2023-07-22

## 2023-10-27 RX ORDER — OMEPRAZOLE 20 MG/1
CAPSULE, DELAYED RELEASE ORAL
Qty: 30 CAPSULE | Refills: 5 | Status: CANCELLED | OUTPATIENT
Start: 2023-07-22

## 2023-10-29 RX ORDER — OMEPRAZOLE 20 MG/1
CAPSULE, DELAYED RELEASE ORAL
Qty: 30 CAPSULE | Refills: 5 | Status: CANCELLED | OUTPATIENT
Start: 2023-07-22

## 2023-10-31 RX ORDER — OMEPRAZOLE 20 MG/1
CAPSULE, DELAYED RELEASE ORAL
Qty: 30 CAPSULE | Refills: 5 | Status: CANCELLED | OUTPATIENT
Start: 2023-07-22

## 2023-11-02 RX ORDER — OMEPRAZOLE 20 MG/1
CAPSULE, DELAYED RELEASE ORAL
Qty: 30 CAPSULE | Refills: 5 | Status: CANCELLED | OUTPATIENT
Start: 2023-07-22

## 2023-11-05 RX ORDER — OMEPRAZOLE 20 MG/1
CAPSULE, DELAYED RELEASE ORAL
Qty: 30 CAPSULE | Refills: 5 | Status: CANCELLED | OUTPATIENT
Start: 2023-07-22

## 2023-11-07 RX ORDER — OMEPRAZOLE 20 MG/1
CAPSULE, DELAYED RELEASE ORAL
Qty: 30 CAPSULE | Refills: 5 | Status: CANCELLED | OUTPATIENT
Start: 2023-07-22

## 2023-11-09 RX ORDER — OMEPRAZOLE 20 MG/1
CAPSULE, DELAYED RELEASE ORAL
Qty: 30 CAPSULE | Refills: 5 | Status: CANCELLED | OUTPATIENT
Start: 2023-07-22

## 2023-11-13 RX ORDER — OMEPRAZOLE 20 MG/1
CAPSULE, DELAYED RELEASE ORAL
Qty: 30 CAPSULE | Refills: 5 | Status: CANCELLED | OUTPATIENT
Start: 2023-07-22

## 2023-11-16 RX ORDER — OMEPRAZOLE 20 MG/1
CAPSULE, DELAYED RELEASE ORAL
Qty: 30 CAPSULE | Refills: 5 | Status: CANCELLED | OUTPATIENT
Start: 2023-07-22

## 2023-11-20 RX ORDER — OMEPRAZOLE 20 MG/1
CAPSULE, DELAYED RELEASE ORAL
Qty: 30 CAPSULE | Refills: 5 | Status: CANCELLED | OUTPATIENT
Start: 2023-07-22

## 2023-11-22 RX ORDER — OMEPRAZOLE 20 MG/1
CAPSULE, DELAYED RELEASE ORAL
Qty: 30 CAPSULE | Refills: 5 | Status: CANCELLED | OUTPATIENT
Start: 2023-07-22

## 2023-11-24 RX ORDER — OMEPRAZOLE 20 MG/1
CAPSULE, DELAYED RELEASE ORAL
Qty: 30 CAPSULE | Refills: 5 | Status: CANCELLED | OUTPATIENT
Start: 2023-07-22

## 2023-11-27 RX ORDER — OMEPRAZOLE 20 MG/1
CAPSULE, DELAYED RELEASE ORAL
Qty: 30 CAPSULE | Refills: 5 | Status: CANCELLED | OUTPATIENT
Start: 2023-07-22

## 2023-11-29 RX ORDER — OMEPRAZOLE 20 MG/1
CAPSULE, DELAYED RELEASE ORAL
Qty: 30 CAPSULE | Refills: 5 | Status: CANCELLED | OUTPATIENT
Start: 2023-07-22

## 2023-12-01 RX ORDER — OMEPRAZOLE 20 MG/1
CAPSULE, DELAYED RELEASE ORAL
Qty: 30 CAPSULE | Refills: 5 | Status: CANCELLED | OUTPATIENT
Start: 2023-07-22

## 2023-12-04 RX ORDER — OMEPRAZOLE 20 MG/1
CAPSULE, DELAYED RELEASE ORAL
Qty: 30 CAPSULE | Refills: 5 | Status: CANCELLED | OUTPATIENT
Start: 2023-07-22

## 2023-12-07 RX ORDER — OMEPRAZOLE 20 MG/1
CAPSULE, DELAYED RELEASE ORAL
Qty: 30 CAPSULE | Refills: 5 | Status: CANCELLED | OUTPATIENT
Start: 2023-07-22

## 2023-12-11 RX ORDER — OMEPRAZOLE 20 MG/1
CAPSULE, DELAYED RELEASE ORAL
Qty: 30 CAPSULE | Refills: 5 | Status: CANCELLED | OUTPATIENT
Start: 2023-07-22

## 2023-12-13 RX ORDER — OMEPRAZOLE 20 MG/1
CAPSULE, DELAYED RELEASE ORAL
Qty: 30 CAPSULE | Refills: 5 | Status: CANCELLED | OUTPATIENT
Start: 2023-07-22

## 2023-12-15 RX ORDER — OMEPRAZOLE 20 MG/1
CAPSULE, DELAYED RELEASE ORAL
Qty: 30 CAPSULE | Refills: 5 | Status: CANCELLED | OUTPATIENT
Start: 2023-07-22

## 2023-12-18 RX ORDER — OMEPRAZOLE 20 MG/1
CAPSULE, DELAYED RELEASE ORAL
Qty: 30 CAPSULE | Refills: 5 | Status: CANCELLED | OUTPATIENT
Start: 2023-07-22

## 2023-12-21 RX ORDER — OMEPRAZOLE 20 MG/1
CAPSULE, DELAYED RELEASE ORAL
Qty: 30 CAPSULE | Refills: 5 | Status: CANCELLED | OUTPATIENT
Start: 2023-07-22

## 2023-12-26 RX ORDER — OMEPRAZOLE 20 MG/1
CAPSULE, DELAYED RELEASE ORAL
Qty: 30 CAPSULE | Refills: 5 | Status: CANCELLED | OUTPATIENT
Start: 2023-07-22

## 2023-12-28 RX ORDER — OMEPRAZOLE 20 MG/1
CAPSULE, DELAYED RELEASE ORAL
Qty: 30 CAPSULE | Refills: 5 | Status: CANCELLED | OUTPATIENT
Start: 2023-07-22

## 2024-01-01 RX ORDER — OMEPRAZOLE 20 MG/1
CAPSULE, DELAYED RELEASE ORAL
Qty: 30 CAPSULE | Refills: 5 | Status: CANCELLED | OUTPATIENT
Start: 2023-07-22

## 2024-01-03 RX ORDER — OMEPRAZOLE 20 MG/1
CAPSULE, DELAYED RELEASE ORAL
Qty: 30 CAPSULE | Refills: 5 | Status: CANCELLED | OUTPATIENT
Start: 2023-07-22

## 2024-01-08 RX ORDER — OMEPRAZOLE 20 MG/1
CAPSULE, DELAYED RELEASE ORAL
Qty: 30 CAPSULE | Refills: 5 | Status: CANCELLED | OUTPATIENT
Start: 2023-07-22

## 2024-01-10 DIAGNOSIS — Z12.31 VISIT FOR SCREENING MAMMOGRAM: Primary | ICD-10-CM

## 2024-01-10 RX ORDER — OMEPRAZOLE 20 MG/1
CAPSULE, DELAYED RELEASE ORAL
Qty: 30 CAPSULE | Refills: 5 | Status: CANCELLED | OUTPATIENT
Start: 2023-07-22

## 2024-01-12 RX ORDER — OMEPRAZOLE 20 MG/1
CAPSULE, DELAYED RELEASE ORAL
Qty: 30 CAPSULE | Refills: 5 | Status: CANCELLED | OUTPATIENT
Start: 2023-07-22

## 2024-01-15 RX ORDER — OMEPRAZOLE 20 MG/1
CAPSULE, DELAYED RELEASE ORAL
Qty: 30 CAPSULE | Refills: 5 | Status: CANCELLED | OUTPATIENT
Start: 2023-07-22

## 2024-01-17 RX ORDER — OMEPRAZOLE 20 MG/1
CAPSULE, DELAYED RELEASE ORAL
Qty: 30 CAPSULE | Refills: 5 | Status: CANCELLED | OUTPATIENT
Start: 2023-07-22

## 2024-01-19 RX ORDER — OMEPRAZOLE 20 MG/1
CAPSULE, DELAYED RELEASE ORAL
Qty: 30 CAPSULE | Refills: 5 | Status: CANCELLED | OUTPATIENT
Start: 2023-07-22

## 2024-01-22 RX ORDER — OMEPRAZOLE 20 MG/1
CAPSULE, DELAYED RELEASE ORAL
Qty: 30 CAPSULE | Refills: 5 | Status: CANCELLED | OUTPATIENT
Start: 2023-07-22

## 2024-01-24 RX ORDER — OMEPRAZOLE 20 MG/1
CAPSULE, DELAYED RELEASE ORAL
Qty: 30 CAPSULE | Refills: 5 | Status: CANCELLED | OUTPATIENT
Start: 2023-07-22

## 2024-01-26 RX ORDER — OMEPRAZOLE 20 MG/1
CAPSULE, DELAYED RELEASE ORAL
Qty: 30 CAPSULE | Refills: 5 | Status: CANCELLED | OUTPATIENT
Start: 2023-07-22

## 2024-01-29 RX ORDER — OMEPRAZOLE 20 MG/1
CAPSULE, DELAYED RELEASE ORAL
Qty: 30 CAPSULE | Refills: 5 | Status: CANCELLED | OUTPATIENT
Start: 2023-07-22

## 2024-01-31 RX ORDER — OMEPRAZOLE 20 MG/1
CAPSULE, DELAYED RELEASE ORAL
Qty: 30 CAPSULE | Refills: 5 | Status: CANCELLED | OUTPATIENT
Start: 2023-07-22

## 2024-02-02 RX ORDER — OMEPRAZOLE 20 MG/1
CAPSULE, DELAYED RELEASE ORAL
Qty: 30 CAPSULE | Refills: 5 | Status: CANCELLED | OUTPATIENT
Start: 2023-07-22

## 2024-02-05 RX ORDER — OMEPRAZOLE 20 MG/1
CAPSULE, DELAYED RELEASE ORAL
Qty: 30 CAPSULE | Refills: 5 | Status: CANCELLED | OUTPATIENT
Start: 2023-07-22

## 2024-02-07 RX ORDER — OMEPRAZOLE 20 MG/1
CAPSULE, DELAYED RELEASE ORAL
Qty: 30 CAPSULE | Refills: 5 | Status: CANCELLED | OUTPATIENT
Start: 2023-07-22

## 2024-02-09 RX ORDER — OMEPRAZOLE 20 MG/1
CAPSULE, DELAYED RELEASE ORAL
Qty: 30 CAPSULE | Refills: 5 | Status: CANCELLED | OUTPATIENT
Start: 2023-07-22

## 2024-02-12 RX ORDER — OMEPRAZOLE 20 MG/1
CAPSULE, DELAYED RELEASE ORAL
Qty: 30 CAPSULE | Refills: 5 | Status: CANCELLED | OUTPATIENT
Start: 2023-07-22

## 2024-02-14 ENCOUNTER — OFFICE VISIT (OUTPATIENT)
Dept: FAMILY MEDICINE CLINIC | Facility: CLINIC | Age: 64
End: 2024-02-14
Payer: COMMERCIAL

## 2024-02-14 VITALS
HEIGHT: 62 IN | HEART RATE: 84 BPM | WEIGHT: 139.8 LBS | OXYGEN SATURATION: 96 % | BODY MASS INDEX: 25.73 KG/M2 | DIASTOLIC BLOOD PRESSURE: 73 MMHG | RESPIRATION RATE: 16 BRPM | TEMPERATURE: 98 F | SYSTOLIC BLOOD PRESSURE: 97 MMHG

## 2024-02-14 DIAGNOSIS — R50.9 FEVER AND CHILLS: ICD-10-CM

## 2024-02-14 DIAGNOSIS — J10.1 INFLUENZA A: Primary | ICD-10-CM

## 2024-02-14 PROCEDURE — 99213 OFFICE O/P EST LOW 20 MIN: CPT | Performed by: FAMILY MEDICINE

## 2024-02-14 PROCEDURE — 87428 SARSCOV & INF VIR A&B AG IA: CPT | Performed by: FAMILY MEDICINE

## 2024-02-14 RX ORDER — OSELTAMIVIR PHOSPHATE 75 MG/1
75 CAPSULE ORAL 2 TIMES DAILY
Qty: 10 CAPSULE | Refills: 0 | Status: SHIPPED | OUTPATIENT
Start: 2024-02-14

## 2024-02-14 RX ORDER — OMEPRAZOLE 20 MG/1
CAPSULE, DELAYED RELEASE ORAL
Qty: 30 CAPSULE | Refills: 5 | Status: CANCELLED | OUTPATIENT
Start: 2023-07-22

## 2024-02-19 RX ORDER — OMEPRAZOLE 20 MG/1
CAPSULE, DELAYED RELEASE ORAL
Qty: 30 CAPSULE | Refills: 5 | Status: CANCELLED | OUTPATIENT
Start: 2023-07-22

## 2024-02-21 RX ORDER — OMEPRAZOLE 20 MG/1
CAPSULE, DELAYED RELEASE ORAL
Qty: 30 CAPSULE | Refills: 5 | Status: CANCELLED | OUTPATIENT
Start: 2023-07-22

## 2024-02-22 RX ORDER — OMEPRAZOLE 20 MG/1
CAPSULE, DELAYED RELEASE ORAL
Qty: 30 CAPSULE | Refills: 5 | Status: CANCELLED | OUTPATIENT
Start: 2023-07-22

## 2024-02-26 RX ORDER — OMEPRAZOLE 20 MG/1
CAPSULE, DELAYED RELEASE ORAL
Qty: 30 CAPSULE | Refills: 5 | Status: CANCELLED | OUTPATIENT
Start: 2023-07-22

## 2024-02-28 ENCOUNTER — OFFICE VISIT (OUTPATIENT)
Dept: FAMILY MEDICINE CLINIC | Facility: CLINIC | Age: 64
End: 2024-02-28
Payer: COMMERCIAL

## 2024-02-28 VITALS
BODY MASS INDEX: 26.17 KG/M2 | HEIGHT: 62 IN | RESPIRATION RATE: 16 BRPM | SYSTOLIC BLOOD PRESSURE: 132 MMHG | HEART RATE: 68 BPM | OXYGEN SATURATION: 98 % | TEMPERATURE: 98.4 F | WEIGHT: 142.2 LBS | DIASTOLIC BLOOD PRESSURE: 83 MMHG

## 2024-02-28 DIAGNOSIS — M54.42 ACUTE LEFT-SIDED LOW BACK PAIN WITH LEFT-SIDED SCIATICA: Primary | ICD-10-CM

## 2024-02-28 PROCEDURE — 99213 OFFICE O/P EST LOW 20 MIN: CPT | Performed by: FAMILY MEDICINE

## 2024-02-28 RX ORDER — CYCLOBENZAPRINE HCL 10 MG
10 TABLET ORAL NIGHTLY PRN
Qty: 20 TABLET | Refills: 0 | Status: SHIPPED | OUTPATIENT
Start: 2024-02-28

## 2024-02-28 RX ORDER — OMEPRAZOLE 20 MG/1
CAPSULE, DELAYED RELEASE ORAL
Qty: 30 CAPSULE | Refills: 5 | Status: CANCELLED | OUTPATIENT
Start: 2023-07-22

## 2024-02-28 RX ORDER — CYCLOBENZAPRINE HCL 10 MG
10 TABLET ORAL NIGHTLY PRN
Qty: 20 TABLET | Refills: 0 | Status: SHIPPED | OUTPATIENT
Start: 2024-02-28 | End: 2024-02-28 | Stop reason: SDUPTHER

## 2024-02-28 RX ORDER — METHYLPREDNISOLONE 4 MG/1
TABLET ORAL
Qty: 1 EACH | Refills: 0 | Status: SHIPPED | OUTPATIENT
Start: 2024-02-28 | End: 2024-02-28 | Stop reason: SDUPTHER

## 2024-02-28 RX ORDER — HYDROCODONE BITARTRATE AND ACETAMINOPHEN 5; 325 MG/1; MG/1
1 TABLET ORAL EVERY 8 HOURS PRN
Qty: 15 TABLET | Refills: 0 | Status: SHIPPED | OUTPATIENT
Start: 2024-02-28 | End: 2024-02-28 | Stop reason: SDUPTHER

## 2024-02-28 RX ORDER — HYDROCODONE BITARTRATE AND ACETAMINOPHEN 5; 325 MG/1; MG/1
1 TABLET ORAL EVERY 8 HOURS PRN
Qty: 15 TABLET | Refills: 0 | Status: SHIPPED | OUTPATIENT
Start: 2024-02-28 | End: 2024-03-04 | Stop reason: SDUPTHER

## 2024-02-28 RX ORDER — METHYLPREDNISOLONE 4 MG/1
TABLET ORAL
Qty: 1 EACH | Refills: 0 | Status: SHIPPED | OUTPATIENT
Start: 2024-02-28

## 2024-02-28 NOTE — PROGRESS NOTES
Subjective   Chief Complaint   Patient presents with    Back Pain     Left side to front of leg down to ankle     Ela Paulino is a 64 y.o. female.     Patient Care Team:  Analy San MD as PCP - General  Magalis Galindo MD as Consulting Physician (Obstetrics and Gynecology)  Alice Garcia MD as Consulting Physician (Rheumatology)  Feng Vásquez MD as Consulting Physician (Cardiology)  Juliana Gaming MD as Consulting Physician (Hematology and Oncology)    History of Present Illness  She is coming in today due to acute lower back pain with radiation to the left leg which started yesterday in the morning.  The pain has been progressively getting worse and now it is affecting her daily functioning.  The pain is in the left lower leg radiating around the hip to the front of the thigh all the way to the ankle.  It feels like a constant ache.  No numbness or tingling.  No weakness.  No urinary issues.  Patient denies any numbness or tingling.  No trauma reported.  Patient reports that sitting makes pain worse, she actually prefers standing while in the office for evaluation today.       The following portions of the patient's history were reviewed and updated as appropriate: allergies, current medications, past family history, past medical history, past social history, past surgical history, and problem list.  Past Medical History:   Diagnosis Date    Abnormal ECG     Anemia     As a kid    Arthritis     Cholelithiasis     Depression     Hearing loss in right ear     Possible Menier's disease. Dr. Orr    Low back pain     Neck pain, acute     Pituitary mass 2006    [facility]; she gets MRI's every 2 years    Positive VEENA (antinuclear antibody)      Past Surgical History:   Procedure Laterality Date    COLONOSCOPY  06/09/2014    negative- 10 yr repeat    CYST REMOVAL Right     wrist    DILATATION AND CURETTAGE      EYE SURGERY      OTHER SURGICAL HISTORY      Laparoscopy     "VAGINAL DELIVERY       -  X3    WISDOM TOOTH EXTRACTION       The patient has a family history of  Family History   Problem Relation Age of Onset    Hypertension Mother     Arthritis Mother     Heart disease Mother     Lung cancer Father     Other Other         FH OF RA- SEVERAL FAMILY MEMBERS    Heart disease Maternal Grandmother     Mental illness Son         Autism    Stroke Paternal Grandmother      Social History     Socioeconomic History    Marital status:    Tobacco Use    Smoking status: Never     Passive exposure: Never    Smokeless tobacco: Never   Vaping Use    Vaping Use: Never used   Substance and Sexual Activity    Alcohol use: Not Currently    Drug use: Never    Sexual activity: Yes     Partners: Male     Birth control/protection: Post-menopausal       Review of Systems   Genitourinary:  Negative for decreased urine volume, difficulty urinating and urinary incontinence.   Musculoskeletal:  Positive for arthralgias, back pain and gait problem.     Visit Vitals  /83 (BP Location: Left arm, Patient Position: Sitting, Cuff Size: Adult)   Pulse 68   Temp 98.4 °F (36.9 °C) (Infrared)   Resp 16   Ht 157.5 cm (62.01\")   Wt 64.5 kg (142 lb 3.2 oz) Comment: with shoes   SpO2 98%   BMI 26.00 kg/m²              Current Outpatient Medications:     cyclobenzaprine (FLEXERIL) 10 MG tablet, Take 1 tablet by mouth At Night As Needed for Muscle Spasms., Disp: 20 tablet, Rfl: 0    HYDROcodone-acetaminophen (Norco) 5-325 MG per tablet, Take 1 tablet by mouth Every 8 (Eight) Hours As Needed for Moderate Pain., Disp: 15 tablet, Rfl: 0    methylPREDNISolone (Medrol) 4 MG dose pack, Take as directed on package instructions., Disp: 1 each, Rfl: 0    omeprazole (priLOSEC) 20 MG capsule, take 1 capsule (20 mg) by oral route once daily before a meal for 30 days, Disp: 30 capsule, Rfl: 5    Objective   Physical Exam  Constitutional:       General: She is not in acute distress.     Appearance: Normal " appearance. She is well-developed. She is not ill-appearing or diaphoretic.      Comments: She is in no acute distress, but she is in pain.   HENT:      Head: Normocephalic and atraumatic.   Pulmonary:      Effort: Pulmonary effort is normal.   Musculoskeletal:      Cervical back: Normal range of motion and neck supple.      Comments: Positive straight leg raising test on the left at about 80 degrees while sitting.   Neurological:      General: No focal deficit present.      Mental Status: She is alert and oriented to person, place, and time. Mental status is at baseline.   Psychiatric:         Mood and Affect: Mood normal.         Assessment & Plan   Diagnoses and all orders for this visit:    1. Acute left-sided low back pain with left-sided sciatica (Primary)  -     XR Spine Lumbar 2 or 3 View; Future  -     Discontinue: methylPREDNISolone (Medrol) 4 MG dose pack; Take as directed on package instructions.  Dispense: 1 each; Refill: 0  -     Discontinue: cyclobenzaprine (FLEXERIL) 10 MG tablet; Take 1 tablet by mouth At Night As Needed for Muscle Spasms.  Dispense: 20 tablet; Refill: 0  -     Discontinue: HYDROcodone-acetaminophen (Norco) 5-325 MG per tablet; Take 1 tablet by mouth Every 8 (Eight) Hours As Needed for Moderate Pain.  Dispense: 15 tablet; Refill: 0  -     methylPREDNISolone (Medrol) 4 MG dose pack; Take as directed on package instructions.  Dispense: 1 each; Refill: 0  -     HYDROcodone-acetaminophen (Norco) 5-325 MG per tablet; Take 1 tablet by mouth Every 8 (Eight) Hours As Needed for Moderate Pain.  Dispense: 15 tablet; Refill: 0  -     cyclobenzaprine (FLEXERIL) 10 MG tablet; Take 1 tablet by mouth At Night As Needed for Muscle Spasms.  Dispense: 20 tablet; Refill: 0      I will be starting her on steroid pack and I also gave her some muscle relaxer.  I also gave patient a few pain pills to take as needed for next few days until steroid started working for her.  I discussed with the patient that  if there is no improvement in her pain further evaluation will be needed with imaging.  She is to monitor the symptoms and contact us back if any concerns.        Return if symptoms worsen or fail to improve, for Recheck.    Requested Prescriptions     Signed Prescriptions Disp Refills    methylPREDNISolone (Medrol) 4 MG dose pack 1 each 0     Sig: Take as directed on package instructions.    HYDROcodone-acetaminophen (Norco) 5-325 MG per tablet 15 tablet 0     Sig: Take 1 tablet by mouth Every 8 (Eight) Hours As Needed for Moderate Pain.    cyclobenzaprine (FLEXERIL) 10 MG tablet 20 tablet 0     Sig: Take 1 tablet by mouth At Night As Needed for Muscle Spasms.

## 2024-02-29 ENCOUNTER — HOSPITAL ENCOUNTER (OUTPATIENT)
Dept: GENERAL RADIOLOGY | Facility: HOSPITAL | Age: 64
Discharge: HOME | End: 2024-02-29
Admitting: FAMILY MEDICINE
Payer: COMMERCIAL

## 2024-02-29 DIAGNOSIS — M54.42 ACUTE LEFT-SIDED LOW BACK PAIN WITH LEFT-SIDED SCIATICA: ICD-10-CM

## 2024-02-29 PROCEDURE — 72100 X-RAY EXAM L-S SPINE 2/3 VWS: CPT

## 2024-03-01 RX ORDER — OMEPRAZOLE 20 MG/1
CAPSULE, DELAYED RELEASE ORAL
Qty: 30 CAPSULE | Refills: 5 | Status: CANCELLED | OUTPATIENT
Start: 2023-07-22

## 2024-03-02 DIAGNOSIS — M54.42 ACUTE LEFT-SIDED LOW BACK PAIN WITH LEFT-SIDED SCIATICA: Primary | ICD-10-CM

## 2024-03-04 ENCOUNTER — TELEPHONE (OUTPATIENT)
Dept: FAMILY MEDICINE CLINIC | Facility: CLINIC | Age: 64
End: 2024-03-04
Payer: COMMERCIAL

## 2024-03-04 DIAGNOSIS — M54.42 ACUTE LEFT-SIDED LOW BACK PAIN WITH LEFT-SIDED SCIATICA: ICD-10-CM

## 2024-03-04 RX ORDER — OMEPRAZOLE 20 MG/1
CAPSULE, DELAYED RELEASE ORAL
Qty: 30 CAPSULE | Refills: 5 | Status: CANCELLED | OUTPATIENT
Start: 2023-07-22

## 2024-03-04 RX ORDER — HYDROCODONE BITARTRATE AND ACETAMINOPHEN 5; 325 MG/1; MG/1
1 TABLET ORAL EVERY 8 HOURS PRN
Qty: 15 TABLET | Refills: 0 | Status: SHIPPED | OUTPATIENT
Start: 2024-03-04

## 2024-03-04 NOTE — TELEPHONE ENCOUNTER
Patient called the office and is aware that you are out the office. Patient has one day of norco 5-325 mg left. She is still in a lot of pain and would like a refill sent to [facility] pharmacy.

## 2024-03-05 ENCOUNTER — HOSPITAL ENCOUNTER (OUTPATIENT)
Dept: MRI IMAGING | Facility: HOSPITAL | Age: 64
Discharge: HOME | End: 2024-03-05
Admitting: FAMILY MEDICINE
Payer: COMMERCIAL

## 2024-03-05 PROCEDURE — 72148 MRI LUMBAR SPINE W/O DYE: CPT

## 2024-03-06 RX ORDER — OMEPRAZOLE 20 MG/1
CAPSULE, DELAYED RELEASE ORAL
Qty: 30 CAPSULE | Refills: 5 | Status: CANCELLED | OUTPATIENT
Start: 2023-07-22

## 2024-03-07 NOTE — PROGRESS NOTES
"Subjective   History of Present Illness: Ela Paulino is a 64 y.o. female is being seen for consultation today at the request of Analy San MD for low back pain.  Patient's symptoms actually began last Tuesday where she felt some tightness in her back.  Her actual pain down her leg began the next day and has been severe ever since.  Her pain distribution is described as from her left side of her low back into her anterior thigh and down her shin into the inner portion of the top of her foot.  She also has significant sensation deficits from right above her knee down into her shin.  She has been ambulating with a cane for extra stability.  She describes no bowel/bladder dysfunction or saddle anesthesia.      History of Present Illness    The following portions of the patient's history were reviewed and updated as appropriate: allergies, current medications, past family history, past medical history, past social history, past surgical history, and problem list.    Review of Systems   Constitutional:  Positive for activity change.   HENT: Negative.     Eyes: Negative.    Respiratory: Negative.     Cardiovascular: Negative.    Gastrointestinal: Negative.    Endocrine: Negative.    Genitourinary: Negative.    Musculoskeletal:  Positive for arthralgias, back pain and myalgias.   Skin: Negative.    Allergic/Immunologic: Negative.    Neurological:  Positive for weakness (bilateral legs) and numbness (tingling/left leg/foot).   Hematological: Negative.    Psychiatric/Behavioral:  Positive for sleep disturbance.    All other systems reviewed and are negative.      Objective     ./86   Pulse 104   Ht 157.5 cm (62.01\")   Wt 63.2 kg (139 lb 4.8 oz)   BMI 25.47 kg/m²    Body mass index is 25.47 kg/m².    Vitals:    03/08/24 0959   PainSc: 10-Worst pain ever          Physical Exam  Neurologic Exam  Left quadricep motor 4+/5  Left anterior and posterior tibialis motor 5/5  0+ patellar reflexes  Positive straight " leg raise   Negative Eligio  No clonus        Assessment & Plan   Independent Review of Radiographic Studies:      I personally reviewed the images from the following studies.    MRI lumbar spine 3/5/2024    Transitional S1 vertebral body.  Far left disc herniation at L4-L5 with likely contact of the exiting L4 nerve root.  There is also focal central bulge with fissure that when combined with her facet arthropathy does result in some lateral recess narrowing on the left as well.      Medical Decision Making:      Ela Stewart a 64 y.o. female presenting today as a new patient to clinic for acute back and left-sided leg pain.  Her symptoms today are consistent with acute L4 radiculopathy.  This is concordant with her recent imaging with foraminal left-sided disc herniation at L4-L5 likely contacting the L4 nerve root.  Patient will be set up with a targeted sided L4-L5 transforaminal MIKA.  I will start her on some gabapentin and place her on steroid pack in hopes of quelling down some inflammation.  She was administered Toradol injection in the office today.  I explained to patient that the normal course of acute radicular pain is resolution within 8 to 12 weeks and that a conservative approach to management is usually recommended.  These conservative approaches include maximizing pharmacologic therapy, targeted physical therapy as well as targeted injection therapy.  I do not believe patient is able to tolerate any physical therapy yet given the amount of pain she is in.    Lifestyle modifications such as decreased activity and avoidance of stress on the spine from bearing weight or twisting will also give the injured area an opportunity to heal and prevent further injury.  We will follow-up in 2 weeks see how she is progressing and hopefully be able to start targeted physical therapy at that time.  I encouraged her to call the office with any questions or concerns.    Details of the Procedure      After  reviewing the risks and benefits, the patient was deemed in satisfactory condition to undergo the procedure.  She was placed in prone position on table and injection area was cleaned with alcohol.  She was administered an IM injection of 30 mg ketorolac into her left dorsogluteal region.  Patient tolerated it well.         Diagnoses and all orders for this visit:    1. Acute lumbar radiculopathy (Primary)  -     Epidural Block  -     XR Spine Lumbar Complete With Flex & Ext; Future  -     ketorolac (TORADOL) injection 30 mg    2. DDD (degenerative disc disease), lumbar  -     Epidural Block  -     XR Spine Lumbar Complete With Flex & Ext; Future    3. Acquired spondylolisthesis  -     XR Spine Lumbar Complete With Flex & Ext; Future    Other orders  -     gabapentin (NEURONTIN) 100 MG capsule; Take 1 capsule by mouth Every Night for 2 days, THEN 2 capsules Every Night for 30 days. Can increase to twice a day dosing as tolerated .  Dispense: 60 capsule; Refill: 0  -     dexAMETHasone (DECADRON) 1.5 MG tablet; Day 1 take 3 in the morning and 3 in the evening, Day 2 take 3 in the morning and 2 in the evening, Day 3 take 3 in the morning and 2 in the evening, Day 4 take 2 in the morning and 2 in the evening, Day 5 take 2 in the morning and 2 in the evening, Day 6 take 2 in the morning and 1 in the evening, Day 7 take 2 in the morning and 1 in the evening, Day 8 take 1 in the morning and 1 in the evening, Day 9 take 1 in the morning and 1 in the evening, Day 10 take 1 in the morning  Dispense: 35 tablet; Refill: 0      Return in about 13 days (around 3/21/2024) for Next scheduled follow up.    This patient was examined wearing appropriate personal protective equipment.     Ela S Lora  reports that she has never smoked. She has never been exposed to tobacco smoke. She has never used smokeless tobacco..        Body mass index is 25.47 kg/m².     BMI is >= 25 and <30. (Overweight) The following options were offered  after discussion; exercise counseling/recommendations and nutrition counseling/recommendations     Patient's blood pressure was reviewed.  Recommendations for  a low-salt diet and exercise to maintain/improve BP in addition to taking any presribed medications.             Mireille Alvarez, ISI  03/08/24  11:27 EST

## 2024-03-08 ENCOUNTER — HOSPITAL ENCOUNTER (OUTPATIENT)
Dept: GENERAL RADIOLOGY | Facility: HOSPITAL | Age: 64
Discharge: HOME | End: 2024-03-08
Admitting: NURSE PRACTITIONER
Payer: COMMERCIAL

## 2024-03-08 ENCOUNTER — OFFICE VISIT (OUTPATIENT)
Dept: NEUROSURGERY | Facility: CLINIC | Age: 64
End: 2024-03-08
Payer: COMMERCIAL

## 2024-03-08 VITALS
BODY MASS INDEX: 25.64 KG/M2 | DIASTOLIC BLOOD PRESSURE: 86 MMHG | HEART RATE: 104 BPM | HEIGHT: 62 IN | SYSTOLIC BLOOD PRESSURE: 119 MMHG | WEIGHT: 139.3 LBS

## 2024-03-08 DIAGNOSIS — M43.10 ACQUIRED SPONDYLOLISTHESIS: ICD-10-CM

## 2024-03-08 DIAGNOSIS — M51.36 DDD (DEGENERATIVE DISC DISEASE), LUMBAR: ICD-10-CM

## 2024-03-08 DIAGNOSIS — M54.16 ACUTE LUMBAR RADICULOPATHY: ICD-10-CM

## 2024-03-08 DIAGNOSIS — M54.16 ACUTE LUMBAR RADICULOPATHY: Primary | ICD-10-CM

## 2024-03-08 PROBLEM — M51.369 DDD (DEGENERATIVE DISC DISEASE), LUMBAR: Status: ACTIVE | Noted: 2024-03-08

## 2024-03-08 PROCEDURE — 72114 X-RAY EXAM L-S SPINE BENDING: CPT

## 2024-03-08 RX ORDER — KETOROLAC TROMETHAMINE 30 MG/ML
30 INJECTION, SOLUTION INTRAMUSCULAR; INTRAVENOUS ONCE
Status: COMPLETED | OUTPATIENT
Start: 2024-03-08 | End: 2024-03-08

## 2024-03-08 RX ORDER — OMEPRAZOLE 20 MG/1
CAPSULE, DELAYED RELEASE ORAL
Qty: 30 CAPSULE | Refills: 5 | Status: CANCELLED | OUTPATIENT
Start: 2023-07-22

## 2024-03-08 RX ORDER — DEXAMETHASONE 1.5 MG/1
1.5 TABLET ORAL TAKE AS DIRECTED
Qty: 35 TABLET | Refills: 0 | Status: SHIPPED | OUTPATIENT
Start: 2024-03-08

## 2024-03-08 RX ORDER — GABAPENTIN 100 MG/1
CAPSULE ORAL
Qty: 60 CAPSULE | Refills: 0 | Status: SHIPPED | OUTPATIENT
Start: 2024-03-08 | End: 2024-04-09

## 2024-03-08 RX ADMIN — KETOROLAC TROMETHAMINE 30 MG: 30 INJECTION, SOLUTION INTRAMUSCULAR; INTRAVENOUS at 10:55

## 2024-03-11 RX ORDER — OMEPRAZOLE 20 MG/1
CAPSULE, DELAYED RELEASE ORAL
Qty: 30 CAPSULE | Refills: 5 | Status: CANCELLED | OUTPATIENT
Start: 2023-07-22

## 2024-03-12 PROBLEM — J10.1 INFLUENZA A: Status: RESOLVED | Noted: 2022-03-31 | Resolved: 2024-03-12

## 2024-03-12 PROBLEM — M54.42 ACUTE LEFT-SIDED LOW BACK PAIN WITH LEFT-SIDED SCIATICA: Status: ACTIVE | Noted: 2024-03-12

## 2024-03-13 ENCOUNTER — HOSPITAL ENCOUNTER (OUTPATIENT)
Dept: PAIN MEDICINE | Facility: HOSPITAL | Age: 64
Discharge: HOME | End: 2024-03-13
Payer: COMMERCIAL

## 2024-03-13 VITALS
WEIGHT: 134 LBS | SYSTOLIC BLOOD PRESSURE: 123 MMHG | HEIGHT: 62 IN | TEMPERATURE: 97.1 F | OXYGEN SATURATION: 97 % | RESPIRATION RATE: 12 BRPM | HEART RATE: 84 BPM | DIASTOLIC BLOOD PRESSURE: 85 MMHG | BODY MASS INDEX: 24.66 KG/M2

## 2024-03-13 DIAGNOSIS — M51.36 DDD (DEGENERATIVE DISC DISEASE), LUMBAR: ICD-10-CM

## 2024-03-13 DIAGNOSIS — R52 PAIN: ICD-10-CM

## 2024-03-13 DIAGNOSIS — M54.16 ACUTE LUMBAR RADICULOPATHY: Primary | ICD-10-CM

## 2024-03-13 PROCEDURE — 77003 FLUOROGUIDE FOR SPINE INJECT: CPT

## 2024-03-13 PROCEDURE — 64484 NJX AA&/STRD TFRM EPI L/S EA: CPT | Performed by: STUDENT IN AN ORGANIZED HEALTH CARE EDUCATION/TRAINING PROGRAM

## 2024-03-13 PROCEDURE — 25010000002 BUPIVACAINE (PF) 0.25 % SOLUTION: Performed by: STUDENT IN AN ORGANIZED HEALTH CARE EDUCATION/TRAINING PROGRAM

## 2024-03-13 PROCEDURE — 25510000001 IOPAMIDOL 41 % SOLUTION: Performed by: STUDENT IN AN ORGANIZED HEALTH CARE EDUCATION/TRAINING PROGRAM

## 2024-03-13 PROCEDURE — 64483 NJX AA&/STRD TFRM EPI L/S 1: CPT | Performed by: STUDENT IN AN ORGANIZED HEALTH CARE EDUCATION/TRAINING PROGRAM

## 2024-03-13 PROCEDURE — 25010000002 DEXAMETHASONE SODIUM PHOSPHATE 10 MG/ML SOLUTION: Performed by: STUDENT IN AN ORGANIZED HEALTH CARE EDUCATION/TRAINING PROGRAM

## 2024-03-13 RX ORDER — DEXAMETHASONE SODIUM PHOSPHATE 10 MG/ML
10 INJECTION, SOLUTION INTRAMUSCULAR; INTRAVENOUS ONCE
Status: COMPLETED | OUTPATIENT
Start: 2024-03-13 | End: 2024-03-13

## 2024-03-13 RX ORDER — IOPAMIDOL 408 MG/ML
3 INJECTION, SOLUTION INTRATHECAL
Status: COMPLETED | OUTPATIENT
Start: 2024-03-13 | End: 2024-03-13

## 2024-03-13 RX ORDER — BUPIVACAINE HYDROCHLORIDE 2.5 MG/ML
10 INJECTION, SOLUTION EPIDURAL; INFILTRATION; INTRACAUDAL ONCE
Status: COMPLETED | OUTPATIENT
Start: 2024-03-13 | End: 2024-03-13

## 2024-03-13 RX ORDER — OMEPRAZOLE 20 MG/1
CAPSULE, DELAYED RELEASE ORAL
Qty: 30 CAPSULE | Refills: 5 | Status: CANCELLED | OUTPATIENT
Start: 2023-07-22

## 2024-03-13 RX ADMIN — BUPIVACAINE HYDROCHLORIDE 10 ML: 2.5 INJECTION, SOLUTION EPIDURAL; INFILTRATION; INTRACAUDAL; PERINEURAL at 09:32

## 2024-03-13 RX ADMIN — IOPAMIDOL 3 ML: 408 INJECTION, SOLUTION INTRATHECAL at 09:32

## 2024-03-13 RX ADMIN — DEXAMETHASONE SODIUM PHOSPHATE 10 MG: 10 INJECTION, SOLUTION INTRAMUSCULAR; INTRAVENOUS at 09:32

## 2024-03-13 NOTE — DISCHARGE INSTRUCTIONS

## 2024-03-13 NOTE — PROCEDURES
Lumbar Transforaminal Epidural Steroid Injection (two levels)  [facility]      PREOPERATIVE DIAGNOSIS:  Lumbar Radiculopathy    POSTOPERATIVE DIAGNOSIS:  Same as preop diagnosis    PROCEDURE:    1. CPT 10289 --  Diagnostic & Therapeutic Transforaminal Epidural Steroid Injection at the L4 level, on the left   2. CPT 55426 --  Diagnostic & Therapeutic Transforaminal Epidural Steroid Injection at the L5 level, on the left     PRE-PROCEDURE DISCUSSION WITH PATIENT:    Risks and complications were discussed with the patient prior to starting the procedure and informed consent was obtained.  We discussed various topics including but not limited to bleeding, infection, injury, nerve injury, paralysis, coma, death, postprocedural painful flare-up, postprocedural site soreness, and a lack of pain relief.  We discussed the diagnostic aspect of transforaminal epidural / selective nerve root blockade.    SURGEON:  Bhanu Lyles MD    REASON FOR PROCEDURE:    Diagnostic injection at this level is needed, Radicular pain pattern seems consistent with this dermatome., Nerve root provocation positivity is noted.  , and Acute pain flareup is noted & problematic, and the injection is used to attempt to break the flareup.      SEDATION:  Patient declined administration of moderate sedation    ANESTHETIC:  Marcaine 0.25%  STEROID:   Dexamethasone 10mg    DESCRIPTON OF PROCEDURE:  After obtaining informed consent, an I.V. was not started in the preoperative area. The patient taken to the operating room and was placed in the prone position with a pillow under the abdomen.  All pressure points were well padded.  The lumbar area was prepped with Chloraprep and draped in a sterile fashion. Under fluoroscopic guidance in an oblique dimension on the above mentioned side, the transverse process of the first aforementioned vertebra at the junction of the body at 6 o'clock position was identified. Skin and subcutaneous tissue was  anesthetized with 1% lidocaine. A 22-gauge spinal needle was introduced under fluoroscopic guidance at the above junction into the foramen without parasthesias and into the epidural space. After confirming the position of the needle with PA, lateral, and oblique fluoroscopic views, aspiration was checked and was clear of blood or CSF.  Next, 1 mL of Omnipaque was injected. After seeing adequate spread on the corresponding nerve root, a total volume 2mL of injectate containing local anesthetic as above and half of the above mentioned corticosteroid was injected into the epidural space.  The needle was removed intact.      Next, in similar fashion, the second level mentioned above was addressed and a similar amount of injectate was delivered after similar imaging was achieved.      Vital signs were stable throughout.          ESTIMATED BLOOD LOSS:  <5 mL  SPECIMENS:  none    COMPLICATIONS:   No complications were noted., There was no indication of vascular uptake on live injection of contrast dye., and There was no indication of intrathecal uptake on live injection of contrast dye.    TOLERANCE & DISCHARGE CONDITION:    The patient tolerated the procedure well.  The patient was transported to the recovery area without difficulties.  The patient was discharged to home under the care of family in stable and satisfactory condition.    PLAN OF CARE:  The patient was given our standard instruction sheet.  The patient will Return to clinic PRN.  The patient will resume all medications as per the medication reconciliation sheet.

## 2024-03-14 ENCOUNTER — TELEPHONE (OUTPATIENT)
Dept: PAIN MEDICINE | Facility: HOSPITAL | Age: 64
End: 2024-03-14
Payer: COMMERCIAL

## 2024-03-14 NOTE — TELEPHONE ENCOUNTER
Post procedure phone call completed.  Pt states they are doing good and denies questions or concerns.  Patient reports pain level a #5.

## 2024-03-15 ENCOUNTER — TELEPHONE (OUTPATIENT)
Dept: NEUROSURGERY | Facility: CLINIC | Age: 64
End: 2024-03-15
Payer: COMMERCIAL

## 2024-03-15 NOTE — TELEPHONE ENCOUNTER
Caller:PATIENT     Relationship: SELF    Best call back number: [phone]    What is the best time to reach you: ANYTIME    Who are you requesting to speak with (clinical staff, provider,  specific staff member): CLINICAL STAFF    Do you know the name of the person who called: NA    What was the call regarding: PATIENT CALLED TO ADVISE THAT SHE HAD HER INJECTION WEDNESDAY AND HAS FELT NO RELIEF-PT STATES THAT HER PAIN IS COMING BACK-PT IS CONCERNED AS SHE DOES NOT WANT IT TO GET TO THE POINT IT WAS BEFORE-PT IS ALSO CONCERNED FOR HER BLOOD PRESSURE BEING HIGHER THAN HER NORMAL-PT STATES THAT SHE SENT A MESSAGE THROUGH [employer]-PT STATES THAT SHE IS SHAKING AND SHE IS UNSURE IF IT IS THE PAIN OR HER MEDICATION-PT IS ASKING FOR A CALL BACK TO DISCUSS HOW SHE IS FEELING-PT IS WANTING TO SEE WHAT RECOMMENDATIONS SHE CAN DO TO KEEP HER PAIN UNDER CONTROL-SENDING TO OFFICE TO ADVISE THANK YOU    Is it okay if the provider responds through [employer]: YES

## 2024-03-18 RX ORDER — OMEPRAZOLE 20 MG/1
CAPSULE, DELAYED RELEASE ORAL
Qty: 30 CAPSULE | Refills: 5 | Status: CANCELLED | OUTPATIENT
Start: 2023-07-22

## 2024-03-18 NOTE — TELEPHONE ENCOUNTER
Called the patient to check on her and let her know that Mireille has been out of the office. I apologized to her for delay in getting back to her. She reports that she has Left leg pain from her knee down while it has improved in severity it is still present, left leg weakness. Her blood pressure is 120/80 which is still elevated for her normal. She has finished her steroids and the tremors have continued. I offered her an appointment tomorrow to see Mireille given the severity of her symptoms. She preferred to move her appointment up. She was moved to tomorrow 3/19/2024 to see Mireille at 2:00 pm.

## 2024-03-19 ENCOUNTER — OFFICE VISIT (OUTPATIENT)
Dept: NEUROSURGERY | Facility: CLINIC | Age: 64
End: 2024-03-19
Payer: COMMERCIAL

## 2024-03-19 VITALS
DIASTOLIC BLOOD PRESSURE: 83 MMHG | SYSTOLIC BLOOD PRESSURE: 114 MMHG | HEIGHT: 62 IN | HEART RATE: 92 BPM | BODY MASS INDEX: 24.66 KG/M2 | WEIGHT: 134 LBS

## 2024-03-19 DIAGNOSIS — M54.16 ACUTE LUMBAR RADICULOPATHY: Primary | ICD-10-CM

## 2024-03-19 DIAGNOSIS — M51.36 DDD (DEGENERATIVE DISC DISEASE), LUMBAR: ICD-10-CM

## 2024-03-19 PROCEDURE — 99214 OFFICE O/P EST MOD 30 MIN: CPT | Performed by: NURSE PRACTITIONER

## 2024-03-19 RX ORDER — GABAPENTIN 300 MG/1
CAPSULE ORAL
Qty: 90 CAPSULE | Refills: 0 | Status: SHIPPED | OUTPATIENT
Start: 2024-03-19 | End: 2024-04-22

## 2024-03-19 NOTE — PROGRESS NOTES
Subjective   History of Present Illness: Ela Paulino is a 64 y.o. female is here today for follow-up for acute lumbar radiculopathy.  Patient was initially seen and evaluated by myself on 3/8/2024 for acute back and left-sided leg pain with symptoms consistent with acute L4 radiculopathy.  Patient had recent imaging with a foraminal left-sided disc herniation likely contacting the L4 nerve root.  She was set up for targeted L4-L5 transforaminal MIKA's.  She was also placed on gabapentin and steroid pack in hopes of quelling down some inflammation.  She was also administered Toradol injection.  Patient continues with fairly significant left-sided leg pain.  Patient's pain is not as bad as it was but still causing significant issues.  She did undergo a targeted left L4 and L5 TF MIKA injection on 3/13/2024 with may be some slight betterment.  She has been taking her gabapentin.  She finished her steroid pack on Sunday and feels like her pain has increased today but again not as bad as it was on initial onset.  She continues to complain of left leg pain down the anterior  portion of her thigh and shin to the inner portion of the top of her foot.  She still has numbness as well.  She describes no foot drop, bowel/bladder dysfunction or saddle anesthesia.  She has felt some jitteriness after undergoing her epidural steroid injection    History of Present Illness    The following portions of the patient's history were reviewed and updated as appropriate: allergies, current medications, past family history, past medical history, past social history, past surgical history, and problem list.    Review of Systems   Constitutional:  Positive for activity change.   HENT: Negative.     Eyes: Negative.    Respiratory: Negative.     Cardiovascular: Negative.    Gastrointestinal: Negative.    Endocrine: Negative.    Genitourinary: Negative.    Musculoskeletal:  Positive for arthralgias, back pain (left leg) and myalgias.   Skin:  "Negative.    Allergic/Immunologic: Negative.    Neurological:  Positive for tremors, weakness (left leg) and numbness (burning/left leg).   Hematological: Negative.    Psychiatric/Behavioral:  Positive for sleep disturbance.    All other systems reviewed and are negative.      Objective     ./83   Pulse 92   Ht 157.5 cm (62\")   Wt 60.8 kg (134 lb)   BMI 24.51 kg/m²    Body mass index is 24.51 kg/m².    Vitals:    03/19/24 1348   PainSc:   7          Physical Exam  Neurologic Exam  Positive straight leg raise test  Left quadriceps 4+/5    Assessment & Plan   Independent Review of Radiographic Studies:      I personally reviewed the images from the following studies.    Lumbar x-rays with flexion-extension 3/8/2024    Slight retrolisthesis of L4 on L5 with no felt instability on flexion-extension maneuvers.    Medical Decision Making:      Ela Stewart a 64 y.o. female presenting to clinic for follow-up of acute left-sided  radiculopathy.  Patient now approximately 4 weeks out from acute onset of her pain.  Although still quite significant for patient I do feel that it is somewhat better after addition of pharmacotherapy and 1 targeted TFESI.  Flexion-extension imaging is reassuring with no significant instability.  Recommendations include continuing with medical management due to the acuity of her symptoms.  We will increase her gabapentin in hopes of getting more control of her pain.  He may also need another epidural steroid injection.  Patient is agreeable as she would like to exhaust all conservative measures before considering surgery.  Will reach out to Dr. Bonilla for his expert opinion and recommendations and notify patient for any changes for plan of care.  Patient agreeable to plan of care and wishes to proceed.      Diagnoses and all orders for this visit:    1. Acute lumbar radiculopathy (Primary)    2. DDD (degenerative disc disease), lumbar    Other orders  -     gabapentin (NEURONTIN) 300 " MG capsule; Take 1 capsule by mouth Every Night for 2 days, THEN 1 capsule 2 (Two) Times a Day for 2 days, THEN 1 capsule 3 (Three) Times a Day for 30 days. Start n  Dispense: 90 capsule; Refill: 0      Return if symptoms worsen or fail to improve.    This patient was examined wearing appropriate personal protective equipment.     Ela Paulino  reports that she has never smoked. She has never been exposed to tobacco smoke. She has never used smokeless tobacco.        Body mass index is 24.51 kg/m².    BMI is >= 25 and <30. (Overweight) The following options were offered after discussion; exercise counseling/recommendations and nutrition counseling/recommendations       Patient's blood pressure was reviewed.  Recommendations for  a low-salt diet and exercise to maintain/improve BP in addition to taking any presribed medications.             Mireille Alvarez, APRN  03/19/24  14:41 EDT

## 2024-03-20 RX ORDER — OMEPRAZOLE 20 MG/1
CAPSULE, DELAYED RELEASE ORAL
Qty: 30 CAPSULE | Refills: 5 | Status: CANCELLED | OUTPATIENT
Start: 2023-07-22

## 2024-03-22 RX ORDER — OMEPRAZOLE 20 MG/1
CAPSULE, DELAYED RELEASE ORAL
Qty: 30 CAPSULE | Refills: 5 | Status: CANCELLED | OUTPATIENT
Start: 2023-07-22

## 2024-03-25 RX ORDER — OMEPRAZOLE 20 MG/1
CAPSULE, DELAYED RELEASE ORAL
Qty: 30 CAPSULE | Refills: 5 | Status: CANCELLED | OUTPATIENT
Start: 2023-07-22

## 2024-03-27 RX ORDER — OMEPRAZOLE 20 MG/1
CAPSULE, DELAYED RELEASE ORAL
Qty: 30 CAPSULE | Refills: 5 | Status: CANCELLED | OUTPATIENT
Start: 2023-07-22

## 2024-03-29 ENCOUNTER — TELEPHONE (OUTPATIENT)
Dept: NEUROSURGERY | Facility: CLINIC | Age: 64
End: 2024-03-29
Payer: COMMERCIAL

## 2024-03-29 NOTE — TELEPHONE ENCOUNTER
Patient called she stated that she will need a work note to cover her being off until she gets in with  and his earliest availability is the 29th of April and she has her work note stating she can go  back April 8th. Please if anything opens up she would like sooner. I stated I would pass the message.

## 2024-04-01 RX ORDER — OMEPRAZOLE 20 MG/1
CAPSULE, DELAYED RELEASE ORAL
Qty: 30 CAPSULE | Refills: 5 | Status: CANCELLED | OUTPATIENT
Start: 2023-07-22

## 2024-04-01 NOTE — TELEPHONE ENCOUNTER
Called and got patient in for Monday the 8th and she stated she will be h ere. She also asked for a note to ask her to be off until after her appointment so I sent and printed and updated note to have her off until the 9th unless at her visit he states otherwise. She will be here tomorrow to  the note.

## 2024-04-02 NOTE — PROGRESS NOTES
"Subjective   History of Present Illness: Ela Paulino is a 64 y.o. female is here today for follow-up for left sided lower back pain into her left leg with burning and numbness from knee to the foot.  Patient has about a 2-month history of pain numbness and paresthesias into her left lower extremity.  When the pain began it was excruciating and would radiate down to her shin.  The excruciating pain has improved but she is left with numbness and paresthesias.  She also has significantly changed her activity due to flareups of pain that will occur when she walks any distance or bends or moves in certain ways.  She has undergone an injection which provided minimal to no relief.  She has also been started on gabapentin which has provided some improvement.  Patient denies any overt weakness.  No right-sided symptoms    Chief Complaint   Patient presents with    Back Pain          Previous treatment: Gabapentin,flexeril,norco decadron    Previous neurosurgery:      Previous injections: TFESI    The following portions of the patient's history were reviewed and updated as appropriate: allergies, current medications, past family history, past medical history, past social history, past surgical history, and problem list.    Review of Systems   Constitutional:  Positive for activity change.   HENT: Negative.     Eyes: Negative.    Respiratory: Negative.     Cardiovascular: Negative.    Gastrointestinal: Negative.    Endocrine: Negative.    Genitourinary: Negative.    Musculoskeletal:  Positive for arthralgias, back pain and myalgias.   Skin: Negative.    Neurological:  Positive for weakness and numbness.   Hematological: Negative.    Psychiatric/Behavioral:  Positive for sleep disturbance.        Objective      /79   Pulse 111   Ht 157.5 cm (62\")   Wt 61.2 kg (135 lb) Comment: patient stated  BMI 24.69 kg/m²    Body mass index is 24.69 kg/m².  Vitals:    04/08/24 1108   PainSc:   8           Neurologic Exam "     Mental Status   Oriented to person, place, and time.     Motor Exam     Strength   Strength 5/5 throughout.     Sensory Exam   Decreased sensation over L4 dermatome on the left       Assessment & Plan   Independent Review of Radiographic Studies:      I personally reviewed and interpreted the images from the following studies.    MRI lumbar spine: There is a foraminal/far lateral disc herniation at L4-5 on the left causing compression of the exiting L4 nerve root.  Mild spondylolisthesis at L5-S1.  There is transitional anatomy with lumbarization of S1    Lumbar flexion-extension x-ray: No dynamic instability.  Mild spondylolisthesis at L5-S1    Medical Decision Making:      Ela Paulino is a 64 y.o. female with 2-month history of L4 radiculopathy on the left with concordant MRI findings with far lateral disc herniation compression of the exiting L4 nerve root.  Patient has failed conservative measures in the form of physical therapy epidural injection and medication.  She would benefit from left L4-5 discectomy.    Note, patient has transitional anatomy with transitional S1    Diagnosis:  Lumbar degenerative disc disease  Lumbar radiculopathy    This patient was examined wearing appropriate personal protective equipment.                      Dr. Colni Bonilla IV    04/08/24  11:50 EDT

## 2024-04-03 RX ORDER — OMEPRAZOLE 20 MG/1
CAPSULE, DELAYED RELEASE ORAL
Qty: 30 CAPSULE | Refills: 5 | Status: CANCELLED | OUTPATIENT
Start: 2023-07-22

## 2024-04-08 ENCOUNTER — OFFICE VISIT (OUTPATIENT)
Dept: NEUROSURGERY | Facility: CLINIC | Age: 64
End: 2024-04-08
Payer: COMMERCIAL

## 2024-04-08 ENCOUNTER — PREP FOR SURGERY (OUTPATIENT)
Dept: OTHER | Facility: HOSPITAL | Age: 64
End: 2024-04-08
Payer: COMMERCIAL

## 2024-04-08 VITALS
HEART RATE: 111 BPM | HEIGHT: 62 IN | WEIGHT: 135 LBS | BODY MASS INDEX: 24.84 KG/M2 | SYSTOLIC BLOOD PRESSURE: 110 MMHG | DIASTOLIC BLOOD PRESSURE: 79 MMHG

## 2024-04-08 DIAGNOSIS — M54.16 ACUTE LUMBAR RADICULOPATHY: Primary | ICD-10-CM

## 2024-04-08 DIAGNOSIS — M51.36 DDD (DEGENERATIVE DISC DISEASE), LUMBAR: ICD-10-CM

## 2024-04-08 PROCEDURE — 99214 OFFICE O/P EST MOD 30 MIN: CPT | Performed by: NEUROLOGICAL SURGERY

## 2024-04-08 RX ORDER — OMEPRAZOLE 20 MG/1
CAPSULE, DELAYED RELEASE ORAL
Qty: 30 CAPSULE | Refills: 5 | Status: CANCELLED | OUTPATIENT
Start: 2023-07-22

## 2024-04-10 RX ORDER — OMEPRAZOLE 20 MG/1
CAPSULE, DELAYED RELEASE ORAL
Qty: 30 CAPSULE | Refills: 5 | Status: CANCELLED | OUTPATIENT
Start: 2023-07-22

## 2024-04-12 ENCOUNTER — TELEPHONE (OUTPATIENT)
Dept: NEUROSURGERY | Facility: CLINIC | Age: 64
End: 2024-04-12
Payer: COMMERCIAL

## 2024-04-12 RX ORDER — OMEPRAZOLE 20 MG/1
CAPSULE, DELAYED RELEASE ORAL
Qty: 30 CAPSULE | Refills: 5 | Status: CANCELLED | OUTPATIENT
Start: 2023-07-22

## 2024-04-12 NOTE — TELEPHONE ENCOUNTER
Patient's surgery is not until 5/15/2024. Called the patient to explain that we will fill them out the day of her surgery. She said [facility] just needs her last OV from Dr. Bonilla this week. I told her I will fax over her records as requested. Records faxed and confirmation will be scanned into chart once received.

## 2024-04-12 NOTE — TELEPHONE ENCOUNTER
Caller: Ela Paulino    Relationship to patient: Self    Best call back number: [phone]    Patient is needing: PATIENT CALLED TO SEE IF WE FAXED RECORDS THAT WERE REQUESTED TO [facility] FROM FAX THAT WAS RECEIVED ON 04/10/24 UNDER MEDIA TAB. PLEASE CALL PATIENT TO ADVISE.    THANK YOU

## 2024-04-15 RX ORDER — OMEPRAZOLE 20 MG/1
CAPSULE, DELAYED RELEASE ORAL
Qty: 30 CAPSULE | Refills: 5 | Status: CANCELLED | OUTPATIENT
Start: 2023-07-22

## 2024-04-17 RX ORDER — OMEPRAZOLE 20 MG/1
CAPSULE, DELAYED RELEASE ORAL
Qty: 30 CAPSULE | Refills: 5 | Status: CANCELLED | OUTPATIENT
Start: 2023-07-22

## 2024-04-19 RX ORDER — OMEPRAZOLE 20 MG/1
CAPSULE, DELAYED RELEASE ORAL
Qty: 30 CAPSULE | Refills: 5 | Status: CANCELLED | OUTPATIENT
Start: 2023-07-22

## 2024-04-22 ENCOUNTER — TELEPHONE (OUTPATIENT)
Dept: FAMILY MEDICINE CLINIC | Facility: CLINIC | Age: 64
End: 2024-04-22

## 2024-04-22 ENCOUNTER — OFFICE VISIT (OUTPATIENT)
Dept: FAMILY MEDICINE CLINIC | Facility: CLINIC | Age: 64
End: 2024-04-22
Payer: COMMERCIAL

## 2024-04-22 VITALS
TEMPERATURE: 97.7 F | BODY MASS INDEX: 25.95 KG/M2 | HEART RATE: 95 BPM | SYSTOLIC BLOOD PRESSURE: 115 MMHG | RESPIRATION RATE: 14 BRPM | DIASTOLIC BLOOD PRESSURE: 74 MMHG | OXYGEN SATURATION: 98 % | HEIGHT: 62 IN | WEIGHT: 141 LBS

## 2024-04-22 DIAGNOSIS — M54.42 ACUTE LEFT-SIDED LOW BACK PAIN WITH LEFT-SIDED SCIATICA: ICD-10-CM

## 2024-04-22 DIAGNOSIS — Z13.220 ENCOUNTER FOR SCREENING FOR LIPID DISORDER: Primary | ICD-10-CM

## 2024-04-22 DIAGNOSIS — K21.9 GASTROESOPHAGEAL REFLUX DISEASE WITHOUT ESOPHAGITIS: ICD-10-CM

## 2024-04-22 PROCEDURE — 80053 COMPREHEN METABOLIC PANEL: CPT | Performed by: FAMILY MEDICINE

## 2024-04-22 PROCEDURE — 80061 LIPID PANEL: CPT | Performed by: FAMILY MEDICINE

## 2024-04-22 RX ORDER — GABAPENTIN 100 MG/1
100 CAPSULE ORAL 3 TIMES DAILY
Status: SHIPPED
Start: 2024-04-22 | End: 2024-04-23

## 2024-04-22 RX ORDER — OMEPRAZOLE 20 MG/1
20 CAPSULE, DELAYED RELEASE ORAL DAILY PRN
Status: SHIPPED
Start: 2024-04-22

## 2024-04-22 RX ORDER — OMEPRAZOLE 20 MG/1
CAPSULE, DELAYED RELEASE ORAL
Qty: 30 CAPSULE | Refills: 5 | Status: CANCELLED | OUTPATIENT
Start: 2023-07-22

## 2024-04-22 NOTE — PROGRESS NOTES
Subjective   Ela Paulino is a 64 y.o. female.     Chief Complaint   Patient presents with    [facility]    Heartburn    Back Pain         Current Outpatient Medications:     omeprazole (priLOSEC) 20 MG capsule, Take 1 capsule by mouth Daily As Needed (GERD)., Disp: , Rfl:     gabapentin (NEURONTIN) 300 MG capsule, Take 1 capsule by mouth 3 (Three) Times a Day., Disp: 90 capsule, Rfl: 2    Past Medical History:   Diagnosis Date    Abnormal ECG     Anemia     As a kid    Arthritis     Cholelithiasis     Depression     DEXA     = (0.1/0.1); = (0.0/ 0.1)    Low back pain     MAMMO     NEG =     Meniere's Rt ear     Neck pain, acute     PAP     (ABHILASH Exposure) = NEG    Positive VEENA (antinuclear antibody)     Thoracic disc disorder 24       Past Surgical History:   Procedure Laterality Date    CATARACT EXTRACTION, BILATERAL      COLONOSCOPY      = NEG, rech        GSI    DIAGNOSTIC LAPAROSCOPY      for poss Tubal Preg    DILATATION AND CURETTAGE      GANGLION CYST EXCISION Right     wrist    LASIK Bilateral     VAGINAL DELIVERY       -  X3    WISDOM TOOTH EXTRACTION         Family History   Problem Relation Age of Onset    Hypertension Mother     Arthritis Mother     Heart disease Mother     COPD Father     Lung cancer Father     No Known Problems Sister     Testicular cancer Brother     Mental illness Son         Autism    Heart disease Maternal Grandmother     Stroke Paternal Grandmother     Other Other         FH OF RA- SEVERAL FAMILY MEMBERS       Social History     Socioeconomic History    Marital status:    Tobacco Use    Smoking status: Never     Passive exposure: Never    Smokeless tobacco: Never   Vaping Use    Vaping status: Never Used   Substance and Sexual Activity    Alcohol use: Not Currently    Drug use: Never    Sexual activity: Yes     Partners: Male     Birth control/protection: Post-menopausal       History of Present Illness  The patient presents to  est care w/ multiple medical concerns.    The patient's blood pressure typically measures around 90/60.    The patient is scheduled for a microscopic lumbar discectomy on 05/15/2024, performed by Dr. Jules. She utilizes her mother's cane and a walker for mobility. She has been off work since 02/29/2024 due to severe pain, which lasted for an 8-day period. The pain has escalated, causing numbness from the knee downwards, to the extent that she was unable to shave her legs due to a loss of sensation. She was initially prescribed gabapentin 100 mg, which was increased to 300 mg 3x/ day due to ineffectiveness. She has undergone x-rays, MRIs, and pain management, but these interventions have not provided relief. She has previously tried epidural injections, but these have also not provided relief.    The patient takes omeprazole as needed for heartburn.    The patient has Meniere's disease in her right ear. She is under the care of an ENT specialist, Dr. Gutierrez.    The patient was diagnosed with a pituitary mass by her gynecologist, which she was advised to check every 2 years. However, she has not had it evaluated recently. She was diagnosed after c/o's galactorrhea. Her last MRI of the brain was conducted at [facility]. Dr. Jules diagnosed her with hand tremors, which were attributed to her pain.    The patient was referred to [facility] due to bleeding veins in her arms, although she does not recall any trauma. She is not currently taking baby aspirin.    The patient had a colonoscopy in 2014, which yielded a negative result. Her last Pap smear was in 11/2023 or 12/2024. She has a history of abnormal Pap smears due to ABHILASH exposure.    Supplemental Information  She sees Dr. Jacinto, her eye doctor in [facility], and her dermatologist is Dr. Radha Dupree.   She is a non-smoker, does not drink alcohol, and does not use drugs. She has 2 cats.   She denies any family history of colon cancer. Her mother is living  and has arthritis, heart issues, and hypertension. Her father  from lung cancer at 63. He had COPD. She has 1 sister and 1 brother, both still living. Her brother had testicular cancer at 40.   She is allergic to CODEINE, which makes her sick.        The following portions of the patient's history were reviewed and updated as appropriate: allergies, current medications, past family history, past medical history, past social history, past surgical history and problem list.    Review of Systems   HENT:  Positive for tinnitus.    Gastrointestinal:  Positive for abdominal distention. Negative for GERD and indigestion.   Musculoskeletal:  Positive for arthralgias, gait problem and myalgias.   Neurological:  Positive for numbness.       Vitals:    24 0959   BP: 115/74   Pulse: 95   Resp: 14   Temp: 97.7 °F (36.5 °C)   SpO2: 98%       Objective   Physical Exam  Vitals and nursing note reviewed.   Constitutional:       General: She is not in acute distress.     Appearance: She is well-developed and normal weight. She is not ill-appearing or toxic-appearing.   HENT:      Head: Normocephalic and atraumatic.   Cardiovascular:      Rate and Rhythm: Normal rate and regular rhythm.      Heart sounds: Normal heart sounds. No murmur heard.  Pulmonary:      Effort: Pulmonary effort is normal. No respiratory distress.      Breath sounds: Normal breath sounds. No wheezing, rhonchi or rales.   Musculoskeletal:      Right lower leg: No edema.      Left lower leg: No edema.   Skin:     General: Skin is warm and dry.      Findings: No rash.   Neurological:      Mental Status: She is alert and oriented to person, place, and time.      Cranial Nerves: No cranial nerve deficit.      Gait: Gait abnormal.   Psychiatric:         Attention and Perception: Attention and perception normal.         Mood and Affect: Mood and affect normal.         Speech: Speech normal.         Behavior: Behavior normal. Behavior is cooperative.          Thought Content: Thought content normal.         Cognition and Memory: Cognition and memory normal.         Judgment: Judgment normal.       Physical Exam              Assessment & Plan   Diagnoses and all orders for this visit:    1. Encounter for screening for lipid disorder (Primary)  -     Lipid Panel; Future  -     Comprehensive Metabolic Panel; Future  -     Lipid Panel  -     Comprehensive Metabolic Panel    2. Gastroesophageal reflux disease without esophagitis    3. Acute left-sided low back pain with left-sided sciatica    Other orders  -     Discontinue: gabapentin (NEURONTIN) 100 MG capsule; Take 1 capsule by mouth 3 (Three) Times a Day.  -     omeprazole (priLOSEC) 20 MG capsule; Take 1 capsule by mouth Daily As Needed (GERD).      Assessment & Plan  1. Sciatica.  A prescription for gabapentin 100 mg, to be taken thrice daily, has been issued.    2. Gastroesophageal reflux disease.  The patient's heartburn is likely a result of stress and pain. The patient has been advised to take omeprazole daily.    3. Health maintenance.  The patient is due for a colonoscopy.     Results     Reviewed 2017 MRI Brain and no micro or macro pituitary adenoma seen  Fasting labs today     Patient or patient representative verbalized consent for the use of Ambient Listening during the visit with  Kelley Parikh DO for chart documentation. 4/28/2024  10:44 EDT

## 2024-04-22 NOTE — TELEPHONE ENCOUNTER
Caller: Ela Paulino    Relationship: Self    Best call back number: [phone]     What medication are you requesting: GABAPENTIN 300MG 3 TIMES DAILY    If a prescription is needed, what is your preferred pharmacy and phone number: [facility] - [place]     Additional notes: PATIENT IS REQUESTING A REFILL FOR GABAPENTIN 300MG. PATIENT IS COMPLETELY OUT OF MEDICATION    PLEASE ADVISE

## 2024-04-22 NOTE — PROGRESS NOTES
Venipuncture performed in right arm by Tonia Lockhart CMA  with good hemostasis. Patient tolerated well. 04/22/24 Tonia Lockhart CMA

## 2024-04-23 LAB
ALBUMIN SERPL-MCNC: 4.5 G/DL (ref 3.5–5.2)
ALBUMIN/GLOB SERPL: 1.7 G/DL
ALP SERPL-CCNC: 73 U/L (ref 39–117)
ALT SERPL W P-5'-P-CCNC: 21 U/L (ref 1–33)
ANION GAP SERPL CALCULATED.3IONS-SCNC: 13 MMOL/L (ref 5–15)
AST SERPL-CCNC: 21 U/L (ref 1–32)
BILIRUB SERPL-MCNC: 0.6 MG/DL (ref 0–1.2)
BUN SERPL-MCNC: 9 MG/DL (ref 8–23)
BUN/CREAT SERPL: 10.8 (ref 7–25)
CALCIUM SPEC-SCNC: 9.8 MG/DL (ref 8.6–10.5)
CHLORIDE SERPL-SCNC: 103 MMOL/L (ref 98–107)
CHOLEST SERPL-MCNC: 214 MG/DL (ref 0–200)
CO2 SERPL-SCNC: 24 MMOL/L (ref 22–29)
CREAT SERPL-MCNC: 0.83 MG/DL (ref 0.57–1)
EGFRCR SERPLBLD CKD-EPI 2021: 79.3 ML/MIN/1.73
GLOBULIN UR ELPH-MCNC: 2.6 GM/DL
GLUCOSE SERPL-MCNC: 97 MG/DL (ref 65–99)
HDLC SERPL-MCNC: 73 MG/DL (ref 40–60)
LDLC SERPL CALC-MCNC: 129 MG/DL (ref 0–100)
LDLC/HDLC SERPL: 1.75 {RATIO}
POTASSIUM SERPL-SCNC: 4.4 MMOL/L (ref 3.5–5.2)
PROT SERPL-MCNC: 7.1 G/DL (ref 6–8.5)
SODIUM SERPL-SCNC: 140 MMOL/L (ref 136–145)
TRIGL SERPL-MCNC: 66 MG/DL (ref 0–150)
VLDLC SERPL-MCNC: 12 MG/DL (ref 5–40)

## 2024-04-23 RX ORDER — GABAPENTIN 300 MG/1
300 CAPSULE ORAL 3 TIMES DAILY
Qty: 90 CAPSULE | Refills: 2 | Status: SHIPPED | OUTPATIENT
Start: 2024-04-23

## 2024-04-23 NOTE — TELEPHONE ENCOUNTER
RELAY      She said she was going down to 100mg TID ----please clarify w/ her - Dr Kati GODFREY asking pt to clarify dose

## 2024-04-23 NOTE — TELEPHONE ENCOUNTER
Name: SaylavonalexandreEla      Relationship: Self      Best Callback Number: [phone]       HUB PROVIDED THE RELAY MESSAGE FROM THE OFFICE      PATIENT: VOICED UNDERSTANDING AND HAS NO FURTHER QUESTIONS AT THIS TIME    ADDITIONAL INFORMATION:      PATIENT STATED THAT SHE TAKES GABAPENTING 300 MG THREE TIMES A DAY.    SEND SCRIPT TO       [facility] [place]  P: [phone]  F: [phone]  Address    [address] [place] [address] [place] [facility]    Operation       Hours: Monday to Friday 7 AM to 7 PM   E-Prescribing   E-Prescribing controlled substances   Mode: Retail   Type: Integrated

## 2024-04-28 PROBLEM — J06.9 ACUTE URI: Status: RESOLVED | Noted: 2022-03-31 | Resolved: 2024-04-28

## 2024-05-01 ENCOUNTER — TELEPHONE (OUTPATIENT)
Dept: NEUROSURGERY | Facility: CLINIC | Age: 64
End: 2024-05-01
Payer: COMMERCIAL

## 2024-05-01 NOTE — TELEPHONE ENCOUNTER
Patient called the office to see what the process for her FMLA papers is. Her PCP office currently has her off work. I explained that when we do her surgery I will fill out her forms from that day forward until she returns to work. She verbalized understanding.

## 2024-05-03 ENCOUNTER — HOSPITAL ENCOUNTER (OUTPATIENT)
Dept: CARDIOLOGY | Facility: HOSPITAL | Age: 64
Discharge: HOME | End: 2024-05-03
Payer: COMMERCIAL

## 2024-05-03 ENCOUNTER — LAB (OUTPATIENT)
Dept: LAB | Facility: HOSPITAL | Age: 64
End: 2024-05-03
Payer: COMMERCIAL

## 2024-05-03 DIAGNOSIS — M54.16 ACUTE LUMBAR RADICULOPATHY: ICD-10-CM

## 2024-05-03 LAB
ABO GROUP BLD: NORMAL
ANION GAP SERPL CALCULATED.3IONS-SCNC: 11.3 MMOL/L (ref 5–15)
BASOPHILS # BLD AUTO: 0.08 10*3/MM3 (ref 0–0.2)
BASOPHILS NFR BLD AUTO: 1.4 % (ref 0–1.5)
BILIRUB UR QL STRIP: NEGATIVE
BLD GP AB SCN SERPL QL: NEGATIVE
BUN SERPL-MCNC: 17 MG/DL (ref 8–23)
BUN/CREAT SERPL: 18.7 (ref 7–25)
CALCIUM SPEC-SCNC: 9.6 MG/DL (ref 8.6–10.5)
CHLORIDE SERPL-SCNC: 101 MMOL/L (ref 98–107)
CLARITY UR: CLEAR
CO2 SERPL-SCNC: 25.7 MMOL/L (ref 22–29)
COLOR UR: YELLOW
CREAT SERPL-MCNC: 0.91 MG/DL (ref 0.57–1)
DEPRECATED RDW RBC AUTO: 46.7 FL (ref 37–54)
EGFRCR SERPLBLD CKD-EPI 2021: 71 ML/MIN/1.73
EOSINOPHIL # BLD AUTO: 0.21 10*3/MM3 (ref 0–0.4)
EOSINOPHIL NFR BLD AUTO: 3.8 % (ref 0.3–6.2)
ERYTHROCYTE [DISTWIDTH] IN BLOOD BY AUTOMATED COUNT: 14.5 % (ref 12.3–15.4)
GLUCOSE SERPL-MCNC: 90 MG/DL (ref 65–99)
GLUCOSE UR STRIP-MCNC: NEGATIVE MG/DL
HBA1C MFR BLD: 6.3 % (ref 4.8–5.6)
HCT VFR BLD AUTO: 43.4 % (ref 34–46.6)
HGB BLD-MCNC: 13.9 G/DL (ref 12–15.9)
HGB UR QL STRIP.AUTO: NEGATIVE
IMM GRANULOCYTES # BLD AUTO: 0.02 10*3/MM3 (ref 0–0.05)
IMM GRANULOCYTES NFR BLD AUTO: 0.4 % (ref 0–0.5)
INR PPP: 0.96 (ref 0.93–1.1)
KETONES UR QL STRIP: NEGATIVE
LEUKOCYTE ESTERASE UR QL STRIP.AUTO: NEGATIVE
LYMPHOCYTES # BLD AUTO: 1.73 10*3/MM3 (ref 0.7–3.1)
LYMPHOCYTES NFR BLD AUTO: 31.1 % (ref 19.6–45.3)
MCH RBC QN AUTO: 28 PG (ref 26.6–33)
MCHC RBC AUTO-ENTMCNC: 32 G/DL (ref 31.5–35.7)
MCV RBC AUTO: 87.3 FL (ref 79–97)
MONOCYTES # BLD AUTO: 0.45 10*3/MM3 (ref 0.1–0.9)
MONOCYTES NFR BLD AUTO: 8.1 % (ref 5–12)
MRSA DNA SPEC QL NAA+PROBE: NORMAL
NEUTROPHILS NFR BLD AUTO: 3.07 10*3/MM3 (ref 1.7–7)
NEUTROPHILS NFR BLD AUTO: 55.2 % (ref 42.7–76)
NITRITE UR QL STRIP: NEGATIVE
NRBC BLD AUTO-RTO: 0 /100 WBC (ref 0–0.2)
PH UR STRIP.AUTO: 6.5 [PH] (ref 5–8)
PLATELET # BLD AUTO: 372 10*3/MM3 (ref 140–450)
PMV BLD AUTO: 9.4 FL (ref 6–12)
POTASSIUM SERPL-SCNC: 4.1 MMOL/L (ref 3.5–5.2)
PROT UR QL STRIP: NEGATIVE
PROTHROMBIN TIME: 10.5 SECONDS (ref 9.6–11.7)
QT INTERVAL: 369 MS
QTC INTERVAL: 422 MS
RBC # BLD AUTO: 4.97 10*6/MM3 (ref 3.77–5.28)
RH BLD: POSITIVE
SODIUM SERPL-SCNC: 138 MMOL/L (ref 136–145)
SP GR UR STRIP: 1.01 (ref 1–1.03)
T&S EXPIRATION DATE: NORMAL
UROBILINOGEN UR QL STRIP: NORMAL
WBC NRBC COR # BLD AUTO: 5.56 10*3/MM3 (ref 3.4–10.8)

## 2024-05-03 PROCEDURE — 86901 BLOOD TYPING SEROLOGIC RH(D): CPT

## 2024-05-03 PROCEDURE — 93005 ELECTROCARDIOGRAM TRACING: CPT | Performed by: NEUROLOGICAL SURGERY

## 2024-05-03 PROCEDURE — 81003 URINALYSIS AUTO W/O SCOPE: CPT

## 2024-05-03 PROCEDURE — 83036 HEMOGLOBIN GLYCOSYLATED A1C: CPT

## 2024-05-03 PROCEDURE — 36415 COLL VENOUS BLD VENIPUNCTURE: CPT

## 2024-05-03 PROCEDURE — 85025 COMPLETE CBC W/AUTO DIFF WBC: CPT

## 2024-05-03 PROCEDURE — 86900 BLOOD TYPING SEROLOGIC ABO: CPT

## 2024-05-03 PROCEDURE — 80048 BASIC METABOLIC PNL TOTAL CA: CPT

## 2024-05-03 PROCEDURE — 87641 MR-STAPH DNA AMP PROBE: CPT

## 2024-05-03 PROCEDURE — 85610 PROTHROMBIN TIME: CPT

## 2024-05-03 PROCEDURE — 86850 RBC ANTIBODY SCREEN: CPT

## 2024-05-15 ENCOUNTER — ANESTHESIA (OUTPATIENT)
Dept: PERIOP | Facility: HOSPITAL | Age: 64
End: 2024-05-15
Payer: COMMERCIAL

## 2024-05-15 ENCOUNTER — ANESTHESIA EVENT (OUTPATIENT)
Dept: PERIOP | Facility: HOSPITAL | Age: 64
End: 2024-05-15
Payer: COMMERCIAL

## 2024-05-15 ENCOUNTER — HOSPITAL ENCOUNTER (OUTPATIENT)
Facility: HOSPITAL | Age: 64
Setting detail: HOSPITAL OUTPATIENT SURGERY
Discharge: HOME | End: 2024-05-15
Attending: NEUROLOGICAL SURGERY | Admitting: NEUROLOGICAL SURGERY
Payer: COMMERCIAL

## 2024-05-15 ENCOUNTER — APPOINTMENT (OUTPATIENT)
Dept: GENERAL RADIOLOGY | Facility: HOSPITAL | Age: 64
End: 2024-05-15
Payer: COMMERCIAL

## 2024-05-15 VITALS
HEIGHT: 62 IN | HEART RATE: 86 BPM | BODY MASS INDEX: 26.61 KG/M2 | RESPIRATION RATE: 12 BRPM | SYSTOLIC BLOOD PRESSURE: 107 MMHG | TEMPERATURE: 96.9 F | WEIGHT: 144.6 LBS | OXYGEN SATURATION: 93 % | DIASTOLIC BLOOD PRESSURE: 68 MMHG

## 2024-05-15 DIAGNOSIS — M54.16 ACUTE LUMBAR RADICULOPATHY: ICD-10-CM

## 2024-05-15 DIAGNOSIS — M54.42 ACUTE LEFT-SIDED LOW BACK PAIN WITH LEFT-SIDED SCIATICA: Primary | ICD-10-CM

## 2024-05-15 PROCEDURE — 63056 DECOMPRESS SPINAL CORD LMBR: CPT

## 2024-05-15 PROCEDURE — 25010000002 PROPOFOL 1000 MG/100ML EMULSION: Performed by: NURSE ANESTHETIST, CERTIFIED REGISTERED

## 2024-05-15 PROCEDURE — 25010000002 HYDROMORPHONE 1 MG/ML SOLUTION: Performed by: NURSE ANESTHETIST, CERTIFIED REGISTERED

## 2024-05-15 PROCEDURE — 25010000002 METHYLPREDNISOLONE PER 40 MG: Performed by: NEUROLOGICAL SURGERY

## 2024-05-15 PROCEDURE — 25010000002 CEFAZOLIN PER 500 MG: Performed by: NEUROLOGICAL SURGERY

## 2024-05-15 PROCEDURE — 25010000002 SUCCINYLCHOLINE PER 20 MG: Performed by: NURSE ANESTHETIST, CERTIFIED REGISTERED

## 2024-05-15 PROCEDURE — 25810000003 SODIUM CHLORIDE 0.9 % SOLUTION 250 ML FLEX CONT: Performed by: NURSE ANESTHETIST, CERTIFIED REGISTERED

## 2024-05-15 PROCEDURE — 25010000002 PHENYLEPHRINE 10 MG/ML SOLUTION: Performed by: NURSE ANESTHETIST, CERTIFIED REGISTERED

## 2024-05-15 PROCEDURE — 99024 POSTOP FOLLOW-UP VISIT: CPT

## 2024-05-15 PROCEDURE — 25010000002 DEXAMETHASONE PER 1 MG: Performed by: NURSE ANESTHETIST, CERTIFIED REGISTERED

## 2024-05-15 PROCEDURE — 25010000002 FENTANYL CITRATE (PF) 100 MCG/2ML SOLUTION: Performed by: NURSE ANESTHETIST, CERTIFIED REGISTERED

## 2024-05-15 PROCEDURE — 25010000002 ONDANSETRON PER 1 MG: Performed by: NURSE ANESTHETIST, CERTIFIED REGISTERED

## 2024-05-15 PROCEDURE — 63056 DECOMPRESS SPINAL CORD LMBR: CPT | Performed by: NEUROLOGICAL SURGERY

## 2024-05-15 PROCEDURE — 25810000003 LACTATED RINGERS PER 1000 ML: Performed by: ANESTHESIOLOGY

## 2024-05-15 PROCEDURE — 25810000003 SODIUM CHLORIDE 0.9 % SOLUTION: Performed by: NURSE ANESTHETIST, CERTIFIED REGISTERED

## 2024-05-15 PROCEDURE — 76000 FLUOROSCOPY <1 HR PHYS/QHP: CPT

## 2024-05-15 PROCEDURE — 25010000002 PHENYLEPHRINE 10 MG/ML SOLUTION 5 ML VIAL: Performed by: NURSE ANESTHETIST, CERTIFIED REGISTERED

## 2024-05-15 PROCEDURE — 72100 X-RAY EXAM L-S SPINE 2/3 VWS: CPT

## 2024-05-15 DEVICE — DEV CONTRL TISS STRATAFIX SPIRAL MNCRYL UD 3/0 PLS 30CM: Type: IMPLANTABLE DEVICE | Site: SPINE LUMBAR | Status: FUNCTIONAL

## 2024-05-15 DEVICE — FLOSEAL WITH RECOTHROM - 10ML.
Type: IMPLANTABLE DEVICE | Site: SPINE LUMBAR | Status: FUNCTIONAL
Brand: FLOSEAL HEMOSTATIC MATRIX

## 2024-05-15 RX ORDER — PROCHLORPERAZINE EDISYLATE 5 MG/ML
10 INJECTION INTRAMUSCULAR; INTRAVENOUS ONCE AS NEEDED
Status: DISCONTINUED | OUTPATIENT
Start: 2024-05-15 | End: 2024-05-15 | Stop reason: HOSPADM

## 2024-05-15 RX ORDER — DIPHENHYDRAMINE HYDROCHLORIDE 50 MG/ML
12.5 INJECTION INTRAMUSCULAR; INTRAVENOUS ONCE AS NEEDED
Status: DISCONTINUED | OUTPATIENT
Start: 2024-05-15 | End: 2024-05-15 | Stop reason: HOSPADM

## 2024-05-15 RX ORDER — SODIUM CHLORIDE 0.9 % (FLUSH) 0.9 %
10 SYRINGE (ML) INJECTION AS NEEDED
Status: DISCONTINUED | OUTPATIENT
Start: 2024-05-15 | End: 2024-05-15 | Stop reason: HOSPADM

## 2024-05-15 RX ORDER — ONDANSETRON 2 MG/ML
INJECTION INTRAMUSCULAR; INTRAVENOUS AS NEEDED
Status: DISCONTINUED | OUTPATIENT
Start: 2024-05-15 | End: 2024-05-15 | Stop reason: SURG

## 2024-05-15 RX ORDER — LIDOCAINE HYDROCHLORIDE 10 MG/ML
INJECTION, SOLUTION EPIDURAL; INFILTRATION; INTRACAUDAL; PERINEURAL AS NEEDED
Status: DISCONTINUED | OUTPATIENT
Start: 2024-05-15 | End: 2024-05-15 | Stop reason: SURG

## 2024-05-15 RX ORDER — OXYCODONE HYDROCHLORIDE 5 MG/1
5 TABLET ORAL ONCE AS NEEDED
Status: COMPLETED | OUTPATIENT
Start: 2024-05-15 | End: 2024-05-15

## 2024-05-15 RX ORDER — FENTANYL CITRATE 50 UG/ML
INJECTION, SOLUTION INTRAMUSCULAR; INTRAVENOUS AS NEEDED
Status: DISCONTINUED | OUTPATIENT
Start: 2024-05-15 | End: 2024-05-15 | Stop reason: SURG

## 2024-05-15 RX ORDER — REMIFENTANIL HYDROCHLORIDE 1 MG/ML
INJECTION, POWDER, LYOPHILIZED, FOR SOLUTION INTRAVENOUS CONTINUOUS PRN
Status: DISCONTINUED | OUTPATIENT
Start: 2024-05-15 | End: 2024-05-15 | Stop reason: SURG

## 2024-05-15 RX ORDER — HYDRALAZINE HYDROCHLORIDE 20 MG/ML
5 INJECTION INTRAMUSCULAR; INTRAVENOUS
Status: DISCONTINUED | OUTPATIENT
Start: 2024-05-15 | End: 2024-05-15 | Stop reason: HOSPADM

## 2024-05-15 RX ORDER — ACETAMINOPHEN 500 MG
1000 TABLET ORAL ONCE
Status: COMPLETED | OUTPATIENT
Start: 2024-05-15 | End: 2024-05-15

## 2024-05-15 RX ORDER — PHENYLEPHRINE HYDROCHLORIDE 10 MG/ML
INJECTION INTRAVENOUS AS NEEDED
Status: DISCONTINUED | OUTPATIENT
Start: 2024-05-15 | End: 2024-05-15 | Stop reason: SURG

## 2024-05-15 RX ORDER — CELECOXIB 200 MG/1
200 CAPSULE ORAL ONCE
Status: COMPLETED | OUTPATIENT
Start: 2024-05-15 | End: 2024-05-15

## 2024-05-15 RX ORDER — ONDANSETRON 2 MG/ML
4 INJECTION INTRAMUSCULAR; INTRAVENOUS ONCE AS NEEDED
Status: DISCONTINUED | OUTPATIENT
Start: 2024-05-15 | End: 2024-05-15 | Stop reason: HOSPADM

## 2024-05-15 RX ORDER — PROMETHAZINE HYDROCHLORIDE 25 MG/1
25 TABLET ORAL ONCE AS NEEDED
Status: DISCONTINUED | OUTPATIENT
Start: 2024-05-15 | End: 2024-05-15 | Stop reason: HOSPADM

## 2024-05-15 RX ORDER — LIDOCAINE HYDROCHLORIDE 10 MG/ML
0.5 INJECTION, SOLUTION INFILTRATION; PERINEURAL ONCE AS NEEDED
Status: DISCONTINUED | OUTPATIENT
Start: 2024-05-15 | End: 2024-05-15 | Stop reason: HOSPADM

## 2024-05-15 RX ORDER — SODIUM CHLORIDE, SODIUM LACTATE, POTASSIUM CHLORIDE, CALCIUM CHLORIDE 600; 310; 30; 20 MG/100ML; MG/100ML; MG/100ML; MG/100ML
INJECTION, SOLUTION INTRAVENOUS CONTINUOUS PRN
Status: DISCONTINUED | OUTPATIENT
Start: 2024-05-15 | End: 2024-05-15

## 2024-05-15 RX ORDER — ACETAMINOPHEN 650 MG/1
650 SUPPOSITORY RECTAL EVERY 4 HOURS PRN
Status: DISCONTINUED | OUTPATIENT
Start: 2024-05-15 | End: 2024-05-15 | Stop reason: HOSPADM

## 2024-05-15 RX ORDER — PROMETHAZINE HYDROCHLORIDE 25 MG/1
25 SUPPOSITORY RECTAL ONCE AS NEEDED
Status: DISCONTINUED | OUTPATIENT
Start: 2024-05-15 | End: 2024-05-15 | Stop reason: HOSPADM

## 2024-05-15 RX ORDER — GABAPENTIN 300 MG/1
300 CAPSULE ORAL ONCE
Status: COMPLETED | OUTPATIENT
Start: 2024-05-15 | End: 2024-05-15

## 2024-05-15 RX ORDER — DEXAMETHASONE SODIUM PHOSPHATE 4 MG/ML
INJECTION, SOLUTION INTRA-ARTICULAR; INTRALESIONAL; INTRAMUSCULAR; INTRAVENOUS; SOFT TISSUE AS NEEDED
Status: DISCONTINUED | OUTPATIENT
Start: 2024-05-15 | End: 2024-05-15 | Stop reason: SURG

## 2024-05-15 RX ORDER — SODIUM CHLORIDE, SODIUM LACTATE, POTASSIUM CHLORIDE, CALCIUM CHLORIDE 600; 310; 30; 20 MG/100ML; MG/100ML; MG/100ML; MG/100ML
1000 INJECTION, SOLUTION INTRAVENOUS CONTINUOUS
Status: DISCONTINUED | OUTPATIENT
Start: 2024-05-15 | End: 2024-05-15 | Stop reason: HOSPADM

## 2024-05-15 RX ORDER — PROPOFOL 10 MG/ML
INJECTION, EMULSION INTRAVENOUS CONTINUOUS PRN
Status: DISCONTINUED | OUTPATIENT
Start: 2024-05-15 | End: 2024-05-15 | Stop reason: SURG

## 2024-05-15 RX ORDER — ACETAMINOPHEN 325 MG/1
650 TABLET ORAL ONCE AS NEEDED
Status: DISCONTINUED | OUTPATIENT
Start: 2024-05-15 | End: 2024-05-15 | Stop reason: HOSPADM

## 2024-05-15 RX ORDER — OXYCODONE HYDROCHLORIDE 5 MG/1
15 TABLET ORAL EVERY 4 HOURS PRN
Status: DISCONTINUED | OUTPATIENT
Start: 2024-05-15 | End: 2024-05-15 | Stop reason: HOSPADM

## 2024-05-15 RX ORDER — LIDOCAINE HYDROCHLORIDE AND EPINEPHRINE 10; 10 MG/ML; UG/ML
INJECTION, SOLUTION INFILTRATION; PERINEURAL AS NEEDED
Status: DISCONTINUED | OUTPATIENT
Start: 2024-05-15 | End: 2024-05-15 | Stop reason: HOSPADM

## 2024-05-15 RX ORDER — OXYCODONE HCL 10 MG/1
10 TABLET, FILM COATED, EXTENDED RELEASE ORAL EVERY 12 HOURS SCHEDULED
Status: DISCONTINUED | OUTPATIENT
Start: 2024-05-15 | End: 2024-05-15 | Stop reason: HOSPADM

## 2024-05-15 RX ORDER — CYCLOBENZAPRINE HCL 10 MG
10 TABLET ORAL 3 TIMES DAILY PRN
Qty: 15 TABLET | Refills: 0 | Status: SHIPPED | OUTPATIENT
Start: 2024-05-15

## 2024-05-15 RX ORDER — SODIUM CHLORIDE 9 MG/ML
INJECTION, SOLUTION INTRAVENOUS CONTINUOUS PRN
Status: DISCONTINUED | OUTPATIENT
Start: 2024-05-15 | End: 2024-05-15 | Stop reason: SURG

## 2024-05-15 RX ORDER — METHYLPREDNISOLONE ACETATE 40 MG/ML
INJECTION, SUSPENSION INTRA-ARTICULAR; INTRALESIONAL; INTRAMUSCULAR; SOFT TISSUE AS NEEDED
Status: DISCONTINUED | OUTPATIENT
Start: 2024-05-15 | End: 2024-05-15 | Stop reason: HOSPADM

## 2024-05-15 RX ORDER — PROPOFOL 10 MG/ML
INJECTION, EMULSION INTRAVENOUS AS NEEDED
Status: DISCONTINUED | OUTPATIENT
Start: 2024-05-15 | End: 2024-05-15

## 2024-05-15 RX ORDER — SUCCINYLCHOLINE CHLORIDE 20 MG/ML
INJECTION INTRAMUSCULAR; INTRAVENOUS AS NEEDED
Status: DISCONTINUED | OUTPATIENT
Start: 2024-05-15 | End: 2024-05-15 | Stop reason: SURG

## 2024-05-15 RX ORDER — HYDROCODONE BITARTRATE AND ACETAMINOPHEN 5; 325 MG/1; MG/1
1 TABLET ORAL EVERY 6 HOURS PRN
Qty: 20 TABLET | Refills: 0 | Status: SHIPPED | OUTPATIENT
Start: 2024-05-15

## 2024-05-15 RX ADMIN — ACETAMINOPHEN 1000 MG: 500 TABLET, FILM COATED ORAL at 10:18

## 2024-05-15 RX ADMIN — REMIFENTANIL HYDROCHLORIDE 0.1 MCG/KG/MIN: 5 INJECTION, POWDER, LYOPHILIZED, FOR SOLUTION INTRAVENOUS at 12:07

## 2024-05-15 RX ADMIN — PHENYLEPHRINE HYDROCHLORIDE 0.5 MCG/KG/MIN: 10 INJECTION INTRAVENOUS at 12:07

## 2024-05-15 RX ADMIN — PHENYLEPHRINE HYDROCHLORIDE 100 MCG: 10 INJECTION INTRAVENOUS at 12:37

## 2024-05-15 RX ADMIN — CELECOXIB 200 MG: 200 CAPSULE ORAL at 10:18

## 2024-05-15 RX ADMIN — LIDOCAINE HYDROCHLORIDE 80 MG: 10 INJECTION, SOLUTION EPIDURAL; INFILTRATION; INTRACAUDAL; PERINEURAL at 12:03

## 2024-05-15 RX ADMIN — DEXAMETHASONE SODIUM PHOSPHATE 4 MG: 4 INJECTION, SOLUTION INTRAMUSCULAR; INTRAVENOUS at 12:07

## 2024-05-15 RX ADMIN — PROPOFOL INJECTABLE EMULSION 150 MCG/KG/MIN: 10 INJECTION, EMULSION INTRAVENOUS at 12:07

## 2024-05-15 RX ADMIN — FENTANYL CITRATE 50 MCG: 50 INJECTION, SOLUTION INTRAMUSCULAR; INTRAVENOUS at 12:48

## 2024-05-15 RX ADMIN — SODIUM CHLORIDE 2 G: 900 INJECTION INTRAVENOUS at 11:47

## 2024-05-15 RX ADMIN — FENTANYL CITRATE 50 MCG: 50 INJECTION, SOLUTION INTRAMUSCULAR; INTRAVENOUS at 12:03

## 2024-05-15 RX ADMIN — SODIUM CHLORIDE, POTASSIUM CHLORIDE, SODIUM LACTATE AND CALCIUM CHLORIDE 1000 ML: 600; 310; 30; 20 INJECTION, SOLUTION INTRAVENOUS at 10:19

## 2024-05-15 RX ADMIN — HYDROMORPHONE HYDROCHLORIDE 1 MG: 1 INJECTION, SOLUTION INTRAMUSCULAR; INTRAVENOUS; SUBCUTANEOUS at 14:00

## 2024-05-15 RX ADMIN — OXYCODONE HYDROCHLORIDE 10 MG: 10 TABLET, FILM COATED, EXTENDED RELEASE ORAL at 10:18

## 2024-05-15 RX ADMIN — SUCCINYLCHOLINE CHLORIDE 80 MG: 20 INJECTION, SOLUTION INTRAMUSCULAR; INTRAVENOUS at 12:03

## 2024-05-15 RX ADMIN — GABAPENTIN 300 MG: 300 CAPSULE ORAL at 10:18

## 2024-05-15 RX ADMIN — HYDROMORPHONE HYDROCHLORIDE 1 MG: 1 INJECTION, SOLUTION INTRAMUSCULAR; INTRAVENOUS; SUBCUTANEOUS at 14:15

## 2024-05-15 RX ADMIN — OXYCODONE 5 MG: 5 TABLET ORAL at 14:16

## 2024-05-15 RX ADMIN — SODIUM CHLORIDE: 9 INJECTION, SOLUTION INTRAVENOUS at 12:10

## 2024-05-15 RX ADMIN — ONDANSETRON 4 MG: 2 INJECTION INTRAMUSCULAR; INTRAVENOUS at 12:07

## 2024-05-15 RX ADMIN — PHENYLEPHRINE HYDROCHLORIDE 200 MCG: 10 INJECTION INTRAVENOUS at 12:47

## 2024-05-15 NOTE — ANESTHESIA POSTPROCEDURE EVALUATION
Patient: Ela Paulino    Procedure Summary       Date: 05/15/24 Room / Location: [facility] OR 12 / [facility] MAIN OR    Anesthesia Start: 1157 Anesthesia Stop: 1359    Procedure: Left lumbar 4 5 far lateral microdiscectomy (Left: Spine Lumbar) Diagnosis:       Acute lumbar radiculopathy      (Acute lumbar radiculopathy [M54.16])    Surgeons: Colin Bonilla IV, MD Provider: Bola Sheffield CRNA    Anesthesia Type: general, ERAS Protocol ASA Status: 2            Anesthesia Type: general, ERAS Protocol    Vitals  Vitals Value Taken Time   /55 05/15/24 1413   Temp 97.8 °F (36.6 °C) 05/15/24 1345   Pulse 96 05/15/24 1413   Resp 21 05/15/24 1400   SpO2 90 % 05/15/24 1413   Vitals shown include unfiled device data.        Post Anesthesia Care and Evaluation    Patient location during evaluation: PACU  Patient participation: complete - patient participated  Level of consciousness: awake and alert  Pain management: satisfactory to patient    Airway patency: patent  Anesthetic complications: No anesthetic complications  PONV Status: none  Cardiovascular status: acceptable  Respiratory status: acceptable  Hydration status: acceptable

## 2024-05-15 NOTE — ANESTHESIA PROCEDURE NOTES
Airway  Date/Time: 5/15/2024 12:06 PM    Indications and Patient Condition  Indications for airway management: airway protection    Preoxygenated: yes  MILS maintained throughout  Mask difficulty assessment: 1 - vent by mask    Final Airway Details  Final airway type: endotracheal airway      Successful airway: ETT  Cuffed: yes   Successful intubation technique: video laryngoscopy  Facilitating devices/methods: intubating stylet  Endotracheal tube insertion site: oral  Blade: [place]  Blade size: 3  ETT size (mm): 7.0  Cormack-Lehane Classification: grade I - full view of glottis  Placement verified by: chest auscultation and capnometry   Cuff volume (mL): 8  Measured from: lips  ETT/EBT  to lips (cm): 21  Number of attempts at approach: 1  Assessment: lips, teeth, and gum same as pre-op and atraumatic intubation

## 2024-05-15 NOTE — ANESTHESIA PREPROCEDURE EVALUATION
Anesthesia Evaluation     Patient summary reviewed and Nursing notes reviewed   NPO Solid Status: > 8 hours  NPO Liquid Status: > 8 hours           Airway   Mallampati: I  TM distance: >3 FB  Neck ROM: full  No difficulty expected  Dental - normal exam     Pulmonary - normal exam   Cardiovascular - normal exam  Exercise tolerance: good (4-7 METS)        Neuro/Psych  (+) tremors, numbness  GI/Hepatic/Renal/Endo      Musculoskeletal     (+) neck pain  Abdominal  - normal exam    Bowel sounds: normal.   Substance History      OB/GYN          Other   arthritis,                 Anesthesia Plan    ASA 2     general and ERAS Protocol   total IV anesthesia  (Preop meds:  tylenol, celebrex, neurontin, oxycontin)  intravenous induction     Anesthetic plan, risks, benefits, and alternatives have been provided, discussed and informed consent has been obtained with: patient.    Plan discussed with CRNA.    CODE STATUS:

## 2024-05-15 NOTE — DISCHARGE SUMMARY
Discharge Summary    Patient: Ela Paulino  : 1960    Patient Care Team:  Kelley Parikh DO as PCP - General (Family Medicine)  Alice Garcia MD as Consulting Physician (Rheumatology)  Samira Dupree MD as Obstetrician (Obstetrics and Gynecology)  Paula Jacinto OD (Optometry)  Jeremy Carey Jr., MD as Consulting Physician (Dermatology)  Colin Bonilla IV, MD as Surgeon (Neurosurgery)  Nirmal Gutierrez MD as Consulting Physician (Otolaryngology)  George Fernandez MD as Consulting Physician (Gastroenterology)    Date of Admit: 5/15/2024    Date of Discharge:  5/15/2024    Discharge Diagnosis:  Acute lumbar radiculopathy      Procedures Performed  Procedure(s):  Left lumbar 4 5 far lateral microdiscectomy       Complications: None    Consultants:   Consults       No orders found from 2024 to 2024.            Condition on Discharge: stable    Discharge disposition: home    HPI: Ela Paulino is a 64 y.o. female who presented with severe left-sided L4 radiculopathy secondary to a far lateral disc herniation at L4-5 causing nerve root compression.  Patient failed all conservative measures and was taken to the OR for the above procedure with Dr. Bonilla on 5/15/2024.    Hospital Course: Patient underwent surgery as scheduled.  They were transferred to PACU after the procedure.  Patient was observed in recovery until discharge criteria were met at which point they were discharged home to self-care.    Vitals:    05/15/24 1001   BP: 111/77   Pulse: 83   Resp: 15   Temp: 98 °F (36.7 °C)   SpO2: 95%         Lab Results (last 24 hours)       ** No results found for the last 24 hours. **                               Discharge Physical Exam:    General  - WD/WN female, appears their stated age, awake, cooperative, in no acute distress  HEENT  - Normocephalic, atraumatic, PERRLA, EOM intact  Respiratory  - Normal respiratory rate and effort  Abdomen  - Flat, soft, NT/ND  Musculoskeletal  -  Moves all extremities well, no joint swelling/tenderness  Skin  - Surgical incision well approximated, clean and dry, no swelling, redness, or drainage  NEUROLOGIC  - A/O x3  - CN II-XII grossly intact  - Moves all extremities symmetrically and with good strength  - Sensation intact throughout      Discharge Medications  Inspect has been reviewed and narcotic consent is on file in the patient's chart.     Your medication list        START taking these medications        Instructions Last Dose Given Next Dose Due   cyclobenzaprine 10 MG tablet  Commonly known as: FLEXERIL      Take 1 tablet by mouth 3 (Three) Times a Day As Needed for Muscle Spasms.       HYDROcodone-acetaminophen 5-325 MG per tablet  Commonly known as: Norco      Take 1 tablet by mouth Every 6 (Six) Hours As Needed for Severe Pain.              CONTINUE taking these medications        Instructions Last Dose Given Next Dose Due   gabapentin 300 MG capsule  Commonly known as: NEURONTIN      Take 1 capsule by mouth 3 (Three) Times a Day.       omeprazole 20 MG capsule  Commonly known as: priLOSEC      Take 1 capsule by mouth Daily As Needed (GERD).                 Where to Get Your Medications        These medications were sent to [facility] [address] [place] [address]      Hours: Monday to Friday 7 AM to 7 PM Phone: [phone]   cyclobenzaprine 10 MG tablet  HYDROcodone-acetaminophen 5-325 MG per tablet         Discharge Diet: Regular, advance as tolerated      Activity at Discharge: No bending or twisting at the waist.   No lifting greater than 5 pounds.  No driving for 1 week.  Do not soak or submerge incision underwater for 6 weeks.      Call for: questions or concerns    Follow-up Appointments  Future Appointments   Date Time Provider Department Center   6/3/2024  9:15 AM Calvin Bridges PA [facility]      Follow-up Information       Kelley Parikh DO .    Specialty: Family Medicine  Contact  information:  [address]  [address]  [place] [address]  [phone]               Calvin Bridges PA. Go on 6/3/2024.    Specialties: Neurosurgery, Physician Assistant  Contact information:  [address]  [address]  [place] [address] [phone]                               I discussed the discharge instructions with patient    DEBRA Moise  05/15/24  13:36 EDT    20 min spent in reviewing records, discussion and examination of the patient and discussion with other members of the patient's medical team.           Part of this note may be an electronic transcription/translation of spoken language to printed text using the Dragon Dictation System.

## 2024-05-15 NOTE — DISCHARGE INSTRUCTIONS
Lumbar Microdiscectomy and Lumbar Decompression    Post-Operative Guide    Dr. Colin Bonilla MD                                            Post-op  ACTIVITY GUIDE     DAY OF SURGERY   “We want you to get up and Move!    Getting out of bed soon after surgery will speed your recovery!”    GOAL:  Out of bed 2 hours after awaking from surgery for your first walk  You may require assistance from nurse  A walker for stability may be used initially but briefly  Short frequent walks (5min) every 2 hours while in hospital  Engage core when getting in and out of bed   Use smart movement strategies when changing positions  Practice DEEP BREATHING while you walk  3-6 count inhale and exhale  Rely on ORAL pain medications NOT IV       ADVANTAGES:  Prevent blood clots in the legs  Prevents pneumonia through promoting deep breathing  Prevents back muscles from stiffening - will decrease pain   You CANNOT walk too much  Oral pain meds have better steady control of pain     POST-OP DAY #1   Diet / Activity / Pain Control / GO HOME    Assessments by Physical Therapy and Occupational Therapy (OT)    GOAL  Review proper spine mechanics/ restrictions  Walking up and down stairs is GOOD   PT / OT will assess when you are safe to go home  Activities of Daily Living - okay to shower, dress, navigate around house  Surgical Incision needs to be covered during showers unless otherwise advised by Dr. Bonilla  No baths, hot tubs, swimming pools for 6 weeks or until incision closed without scab.   Go home !    Criteria for Discharge Home:  Cleared by PT / OT   Cleared by the Medicine Service  Adequate pain control with or without medications  Tolerating food and Liquids  Ability to urinate  Having a bowel movement IS NOT a discharge requirement unless there is a medical issue  Depends on pain control, support at home, mobility    Occasionally some patients with extra care needs continue their recovery at a local rehabilitation facility (e.g.  patients with minimal home social support, additional medical needs). Hospital based case coordinator will assist with rehab placement.     ACTIVITY GUIDELINES    Careful with the BLT's for 3 months !    Bending  Engage Core when changing positions   Use smart movement strategies - Squat, kneel, support yourself using arms  Bending with bad form once or twice is NOT a problem  Not using core to bend can “strain” back muscles    Lifting  General weight limit for first 6 weeks is a maximum of 20-40 lbs   Anything that causes “strain” should not be lifted  Coughing, sneezing, vomiting, straining with bowel movements can cause damage  Lap top, gallon of milk, purse, infant children okay  Luggage, large backpack, heavy grocery bag, furniture - NOT okay  Lift things close to body - limit reaching to pick things up    Twisting  Turn hips, shoulders and spine as one unit  Avoid reaching across the body  Activate core during more complex movements  Remember it is repetitive poor spine mechanics not solitary actions which can be harmful to your spine    Driving    - Okay to consider during first 2 weeks after surgery   - MUST meet following requirements    -You must be OFF narcotics and not under their influence     - You must be a SAFE  yourself.     - Patients may be considered to be UNSAFE if they are:     Distracted by their pain     Unable to sit comfortably for the required length of the journey               Unable to use mirrors safely because of restrictions or pain                ACTIVITY - FIRST 2 WEEKS:    Low Impact Aerobic Exercise  5-30min 2 times per day EVERY DAY  Walking, elliptical, stairs and/or recumbent bike outdoors,  Slow safe pace, okay to do intervals (walk 4 min rest 1 min x 3 -5 sets)  No hiking, speed walking or carrying.   FOCUS ON POSTURE, DEEP BREATHING (6 count) AND CORE ACTIVATION    Physical Therapy  Will start after your first post-operative office visit (2 weeks)  It's Important, So,  YES it's Mandatory  2 times per week x 12 weeks  We can recommend the Physical Therapist near your home  They should help guide your core exercise program  Home exercises and low impact aerobic work  should be done 4-5x per week in addition to PT   The static core program we recommend your physical therapist take you though is attached  Additional modalities are balance and light resistance band training     RETURNING TO WORK     The timescale for Return to Work will vary from patient to patient and depends mainly on the nature of your specific surgery and the type of work / activity you wish to recommence.     General Guidelines:    Can work from home if needed within the first week or so of surgery  Do not work for longer than 30-45 minutes at a time without a break (standing and walking around)    Sedentary jobs (desk work, etc)   Typically within 1-6 weeks  Depends on particulars of job, driving distance, stress, etc  Light duty  <20 lbs weight limit, Minimal BLT  If you have the option, sometimes returning to work alternate days (i.e Mon-Wed-Fri) for 1-2 weeks assists with the transition back to work.     Manual Labor  3-6 months - varies per case  depends on intensity and weight limit and specific surgery   must have exhibited a strong core with Level 3 or higher on core program or a note from PT reflecting a strong core                RECREATIONAL SPORTS & ACTIVITIES     After 2 weeks  swimming    After 6 weeks   hiking (no Pack, light grade)  Biking, jogging, resistance training, climbing, Pilates, sports specific drills, body weight interval training, golf, tennis  Core level 3 completed  Start slow and build up slowly   Do NOT go back FULL SPEED right away     After 3 months  Skiing, horseback riding, ATV riding, snow mobile etc      Sports - Non-Contact  Minimum 6 weeks  Must have core level 3 or greater  Must have completed aerobic and resistance training   Must have successfully done sports specific  drill and been asymptomatic    Sports - Contact   Varies from sport to sport; usual 3-6 months  Must exhibit strong Core Level 4 or 5   Must have completed non-contact  sports specific drills without any symptoms      Lifelong recommendations:  Warm up before EVERY activity 5-10 minutes using Recumbent bike, Stair Master, elliptical , speed walk  Core exercises:   3 -5 x /week - forever!  10 minutes  Should follow warm up prior to activity / sport  Always know your core level

## 2024-05-15 NOTE — NURSING NOTE
Dr. Bonilla at bedside and examined patient. Made provider aware of bilateral upper extremity tremors and pt stating that she does it at baseline. No new orders at this time.

## 2024-05-15 NOTE — H&P
History of Present Illness: Ela Paulino is a 64 y.o. female is here today for follow-up for left sided lower back pain into her left leg with burning and numbness from knee to the foot.  Patient has about a 3.5-month history of pain numbness and paresthesias into her left lower extremity.  When the pain began it was excruciating and would radiate down to her shin.  The excruciating pain has improved but she is left with numbness and paresthesias.  She also has significantly changed her activity due to flareups of pain that will occur when she walks any distance or bends or moves in certain ways.  She has undergone an injection which provided minimal to no relief.  She has also been started on gabapentin which has provided some improvement.  Patient denies any overt weakness.  No right-sided symptoms.  No changes since she was last seen         Chief Complaint   Patient presents with    Back Pain            Previous treatment: Gabapentin,flexeril,norco decadron     Previous neurosurgery:       Previous injections: TFESI     The following portions of the patient's history were reviewed and updated as appropriate: allergies, current medications, past family history, past medical history, past social history, past surgical history, and problem list.     Review of Systems   Constitutional:  Positive for activity change.   HENT: Negative.     Eyes: Negative.    Respiratory: Negative.     Cardiovascular: Negative.    Gastrointestinal: Negative.    Endocrine: Negative.    Genitourinary: Negative.    Musculoskeletal:  Positive for arthralgias, back pain and myalgias.   Skin: Negative.    Neurological:  Positive for weakness and numbness.   Hematological: Negative.    Psychiatric/Behavioral:  Positive for sleep disturbance.                Objective    Neurologic Exam      Mental Status   Oriented to person, place, and time.      Motor Exam      Strength   Strength 5/5 throughout.      Sensory Exam   Decreased sensation  over L4 dermatome on the left               Assessment & Plan    Medical Decision Making:       Ela Paulino is a 64 y.o. female with 3.5-month history of L4 radiculopathy on the left with concordant MRI findings with far lateral disc herniation compression of the exiting L4 nerve root.  Patient has failed conservative measures in the form of physical therapy epidural injection and medication.  Will proceed with far lateral left L4-5 discectomy.  Patient or stands the risks of surgery as well as increased risk due to medical comorbidities including anemia cholelithiasis Ménière's disease and other medical comorbidities and she is agreed to undergo the procedure

## 2024-05-15 NOTE — OP NOTE
LUMBAR DISCECTOMY MICRO  Procedure Report    Patient Name:  Ela Paulino  YOB: 1960    Date of Surgery:  5/15/2024     Indications: 64-year-old female with a 3 to 4-month history of severe left lower extremity radiculopathy referable to the L4 nerve root with far lateral disc herniation on MRI at L4-5 on the left correlating well with the patient's symptoms.  Patient failed all conservative measures.  Given this patient was taken to the OR for far lateral discectomy at L4-5 on the left.  Patient understood the risks of surgery including bleeding infection CSF leak nerve damage spinal cord injury disc reherniation no improvement after surgery need for future surgery including fusion among many other risks and she agreed to undergo the procedure    Pre-op Diagnosis:   Acute lumbar radiculopathy [M54.16]       Post-Op Diagnosis Codes:     * Acute lumbar radiculopathy [M54.16]    Procedure/CPT® Codes:      Procedure(s):  Left lumbar 4 5 far lateral microdiscectomy      Staff:  Surgeon(s):  Colin Bonilla IV, MD    Assistant: Calvin Bridges PA    Anesthesia: General    Estimated Blood Loss:  50cc    Implants:    Implant Name Type Inv. Item Serial No.  Lot No. LRB No. Used Action   DEV CONTRL TISS STRATAFIX SPIRAL MNCRYL UD 3/0 PLS 30CM - [id number] Implant DEV CONTRL TISS STRATAFIX SPIRAL MNCRYL UD 3/0 PLS 30CM  [employer]  DIV [employer] [id number] Left 1 Implanted   KT SEAL HEMOS ABS FLOSEAL MATRX 1.5/FAST/PREP 5000/IU 10ML - [id number] Implant KT SEAL HEMOS ABS FLOSEAL MATRX 1.5/FAST/PREP 5000/IU 10ML  [employer] [id number] Left 1 Implanted   KT SEAL HEMOS ABS FLOSEAL MATRX 1.5/FAST/PREP 5000/IU 10ML - [id number] Implant KT SEAL HEMOS ABS FLOSEAL MATRX 1.5/FAST/PREP 5000/IU 10ML  [employer] [id number] Left 1 Implanted       Specimen:          None        Findings: Herniated disc    Complications: None    Description of Procedure: Patient was taken to the OR and placed  under general endotracheal anesthesia.  She was flipped into the prone position on Yakov table and prepped and draped in usual fashion.  Using AP and lateral fluoroscopy an incision was planned over the L4-5 disc space along the lateral pedicular border.  Attention was paid to the patient's transitional anatomy in comparison between the patient's preoperative imaging and intraoperative imaging help with localization.  Incision was made as planned and carried down to the deep dermal and fat layers with monopolar cautery.  Fascia was opened.  Tubular retractor was then placed over the lateral facet and transverse process under AP and lateral fluoroscopic guidance.  Microscope was brought to the field.  Tissue over the lateral facet was opened and removed.  Muscular attachments along the facet and superior portion of the transverse process were also carefully detached with bipolar monopolar cautery.  High-speed drill was used to remove a portion of the lateral facet as well as superior transverse process.  3 Kerrison was used to remove remaining shelled out bone opening up the foramen.  The pedicle was identified and careful dissection with a Penfield 3 was used to dissect down to the disc space.  The disc space was identified.  Location of the nerve root was identified using direct stimulation.  Tissue overlying the nerve root was carefully  from the root and partially removed.  The nerve root was retracted away from the disc space superiorly and laterally and the disc space was open.  Several fragments of herniated disc were easily expressed.  Intradiscal space and epidural space was explored with no evidence of further disc herniation or nerve compression.  Wound was thoroughly irrigated and hemostasis was achieved with bipolar cautery and Gelfoam powder.  Steroid was placed over the exiting nerve root.  Tubular retractor was removed.  Fascia was closed with 0 Vicryl sutures, the deep dermal layer with  2-0 Vicryl sutures and the skin was closed with a running subcuticular Monocryl.  Dermabond was placed over the wound.  There were no changes in neuromonitoring throughout the case                Assistant: Calvin Bridges PA  was responsible for performing the following activities: Retraction, Suction, Irrigation, Suturing, Closing, and Placing Dressing and their skilled assistance was necessary for the success of this case.    Colin Bonilla IV, MD     Date: 5/15/2024  Time: 13:22 EDT

## 2024-05-16 RX ORDER — OMEPRAZOLE 20 MG/1
20 CAPSULE, DELAYED RELEASE ORAL DAILY PRN
Start: 2024-05-16

## 2024-05-28 ENCOUNTER — TELEPHONE (OUTPATIENT)
Dept: NEUROSURGERY | Facility: CLINIC | Age: 64
End: 2024-05-28
Payer: COMMERCIAL

## 2024-05-28 NOTE — TELEPHONE ENCOUNTER
Patient called and stated she has a knot above her incision and its really sore and she has had no fever,drainage. But its a allie soft but hard spot that is causing her a lot of pain and even a heating pad or pillow is not helping her. She wanted to know if it could wait until Monday or needed to come sooner.

## 2024-05-28 NOTE — PROGRESS NOTES
Subjective     Chief Complaint   Patient presents with    Post-op         Previous Treatment: left lumbar 4 5 far lateral microdiscectomy 5/15/24     HPI: Ela Paulino is a 64 y.o. female here for initial postoperative follow-up after undergoing the above procedure with Dr. Bonilla.  Patient states she has been doing relatively well since surgery.  The burning pain down her left leg has improved.  She still has some numbness and tingling down her left leg, particularly below the knee, however this is not quite as severe as it was prior to surgery.  Otherwise she has no new complaints.  She denies lower extremity weakness, fever, and chills.        PMH:  Past Medical History:   Diagnosis Date    Abnormal ECG     Anemia     Arthritis     Cholelithiasis     Delayed emergence from anesthesia     Depression     DEXA     Low back pain     MAMMO     Meniere's Rt ear     Neck pain, acute     PAP     Positive VEENA (antinuclear antibody)     Thoracic disc disorder 2/28/24         Current Outpatient Medications:     gabapentin (NEURONTIN) 300 MG capsule, Take 1 capsule by mouth 3 (Three) Times a Day., Disp: 90 capsule, Rfl: 2    omeprazole (priLOSEC) 20 MG capsule, Take 1 tablet by oral route daily, Disp: 30 capsule, Rfl: 6    cyclobenzaprine (FLEXERIL) 10 MG tablet, Take 1 tablet by mouth 3 (Three) Times a Day As Needed for Muscle Spasms. (Patient not taking: Reported on 6/3/2024), Disp: 15 tablet, Rfl: 0    HYDROcodone-acetaminophen (Norco) 5-325 MG per tablet, Take 1 tablet by mouth Every 6 (Six) Hours As Needed for Severe Pain. (Patient not taking: Reported on 6/3/2024), Disp: 20 tablet, Rfl: 0    omeprazole (priLOSEC) 20 MG capsule, Take 1 capsule by mouth Daily As Needed (GERD). (Patient not taking: Reported on 6/3/2024), Disp: , Rfl:      Allergies   Allergen Reactions    Codeine Nausea Only        Past Surgical History:   Procedure Laterality Date    CATARACT EXTRACTION, BILATERAL      COLONOSCOPY      2014= NEG, rech  "       GSI    DIAGNOSTIC LAPAROSCOPY      for poss Tubal Preg    DILATATION AND CURETTAGE      GANGLION CYST EXCISION Right     wrist    LASIK Bilateral     LUMBAR DISCECTOMY Left 5/15/2024    Procedure: Left lumbar 4 5 far lateral microdiscectomy;  Surgeon: Colin Bonilla IV, MD;  Location: [facility] MAIN OR;  Service: Neurosurgery;  Laterality: Left;    VAGINAL DELIVERY       -  X3    WISDOM TOOTH EXTRACTION          Family History   Problem Relation Age of Onset    Hypertension Mother     Arthritis Mother     Heart disease Mother     COPD Father     Lung cancer Father     No Known Problems Sister     Testicular cancer Brother     Mental illness Son         Autism    Heart disease Maternal Grandmother     Stroke Paternal Grandmother     Other Other         FH OF RA- SEVERAL FAMILY MEMBERS         Social Hx:  Social History     Tobacco Use   Smoking Status Never    Passive exposure: Never   Smokeless Tobacco Never      Alcohol Use: Not At Risk (2022)    Received from [facility], [facility]    AUDIT-C     Frequency of Alcohol Consumption: Never     Average Number of Drinks: Not on file     Frequency of Binge Drinking: Not on file      Social History     Substance and Sexual Activity   Drug Use Never          Review of Systems   Constitutional:  Positive for activity change.   HENT: Negative.     Eyes: Negative.    Respiratory: Negative.     Cardiovascular: Negative.    Gastrointestinal: Negative.    Endocrine: Negative.    Genitourinary: Negative.    Musculoskeletal:  Positive for arthralgias, back pain and myalgias.   Skin:         Bulge by incision that is really sore    Allergic/Immunologic: Negative.    Neurological:  Positive for numbness (+tingling in left leg).        Ache in back and sharp in left knee    Psychiatric/Behavioral:  Positive for sleep disturbance.          Objective     /74   Pulse 92   Resp 18   Ht 157.5 cm (62\")   Wt 65.3 kg (144 lb)   SpO2 99%   BMI 26.34 kg/m²  "   Body mass index is 26.34 kg/m².      Physical Exam  Vitals reviewed.   Constitutional:       General: She is not in acute distress.     Appearance: Normal appearance.   Cardiovascular:      Rate and Rhythm: Normal rate and regular rhythm.      Pulses: Normal pulses.   Pulmonary:      Effort: Pulmonary effort is normal. No respiratory distress.   Musculoskeletal:         General: No swelling or tenderness. Normal range of motion.      Right lower leg: No edema.      Left lower leg: No edema.   Skin:     General: Skin is warm and dry.      Findings: No bruising, erythema or rash.      Comments: Surgical incision CDI w/ no swelling redness or drainage   Neurological:      General: No focal deficit present.      Mental Status: She is alert and oriented to person, place, and time.      Sensory: Sensation is intact.      Motor: Motor function is intact. No weakness.                Assessment & Plan     MDM: Ela Paulino is a 64 y.o. female here for initial postoperative follow-up after undergoing left L4-5 far lateral microdiscectomy with Dr. Bonilla.  Patient doing well with some initial improvement in her left-sided leg pain.  Numbness and tingling is slowly improving.  She is neurologically intact on exam.  No red flag signs or symptoms.  Incision is healing nicely with no signs of infection or wound breakdown.    At this point patient should begin outpatient PT and advance their level of activity w/ PT guidance. No lifting >30 lbs. They may shower but should refrain from submerging the incision underwater for the next 4 weeks. I will have them follow-up with Dr. Bonilla 3 months postoperatively.  Patient is agreeable to this plan.         Diagnosis Plan   1. DDD (degenerative disc disease), lumbar  Ambulatory Referral to Physical Therapy      2. Acute lumbar radiculopathy  Ambulatory Referral to Physical Therapy          Return in about 3 months (around 9/3/2024) for 3-month post for follow-up with Dr. Bonilla.                 This patient was examined wearing appropriate personal protective equipment.            Calvin Bridges PA-C    06/03/24  09:33 EDT      Part of this note may be an electronic transcription/translation of spoken language to printed text using the Dragon Dictation System.

## 2024-06-03 ENCOUNTER — OFFICE VISIT (OUTPATIENT)
Dept: NEUROSURGERY | Facility: CLINIC | Age: 64
End: 2024-06-03
Payer: COMMERCIAL

## 2024-06-03 VITALS
BODY MASS INDEX: 26.5 KG/M2 | WEIGHT: 144 LBS | HEART RATE: 92 BPM | HEIGHT: 62 IN | DIASTOLIC BLOOD PRESSURE: 74 MMHG | RESPIRATION RATE: 18 BRPM | OXYGEN SATURATION: 99 % | SYSTOLIC BLOOD PRESSURE: 116 MMHG

## 2024-06-03 DIAGNOSIS — M54.16 ACUTE LUMBAR RADICULOPATHY: ICD-10-CM

## 2024-06-03 DIAGNOSIS — M51.36 DDD (DEGENERATIVE DISC DISEASE), LUMBAR: Primary | ICD-10-CM

## 2024-06-03 PROCEDURE — 99024 POSTOP FOLLOW-UP VISIT: CPT

## 2024-06-05 ENCOUNTER — TREATMENT (OUTPATIENT)
Dept: PHYSICAL THERAPY | Facility: CLINIC | Age: 64
End: 2024-06-05
Payer: COMMERCIAL

## 2024-06-05 DIAGNOSIS — M51.36 DDD (DEGENERATIVE DISC DISEASE), LUMBAR: Primary | ICD-10-CM

## 2024-06-05 DIAGNOSIS — M54.16 ACUTE LUMBAR RADICULOPATHY: ICD-10-CM

## 2024-06-05 PROCEDURE — 97162 PT EVAL MOD COMPLEX 30 MIN: CPT | Performed by: PHYSICAL THERAPIST

## 2024-06-05 PROCEDURE — 97140 MANUAL THERAPY 1/> REGIONS: CPT | Performed by: PHYSICAL THERAPIST

## 2024-06-05 PROCEDURE — 97110 THERAPEUTIC EXERCISES: CPT | Performed by: PHYSICAL THERAPIST

## 2024-06-05 NOTE — PROGRESS NOTES
Physical Therapy Initial Evaluation and Plan of Care  [address] [address]    Patient: Ela Paulino   : 1960  Diagnosis/ICD-10 Code:  DDD (degenerative disc disease), lumbar [M51.36]  Referring practitioner: DEBRA Moise  Date of Initial Visit: 2024  Today's Date: 2024  Patient seen for 1 session           Visit Diagnoses:     ICD-10-CM ICD-9-CM   1. DDD (degenerative disc disease), lumbar  M51.36 722.52   2. Acute lumbar radiculopathy  M54.16 724.4       Subjective Questionnaire: Oswestry: 21/50 points    Subjective Evaluation    History of Present Illness  Date of surgery: 5/15/2024 (left L4/5 far lateral microdiscectomy)  Mechanism of injury: Patient presents to physical therapy with orders to address back pain following a recent left L4/5 far lateral microdiscectomy on 5/15/24 by Dr. Bonilla.  She is currently 3 weeks post-op.  She states that she still has significant weakness and numbness in her left leg.  She states that her leg feels heavy and like it weighs 100#.  She has significant pain into her L knee and numbness/tingling from her L knee to her ankle.  She initially used a walker and a cane for ambulation but she was able to stop using the cane one week ago.  Her surgeon told her that it may take up to 1 year for full recovery from her surgery.   She has not been released to return to work until .    She states that sleeping is a nightmare.  She can't get comfortable and has a lot of pain at night.  When she is up moving a lot, she states that she feels shaky.  She has difficulty with prolonged positioning and weightbearing activities. She has returned to driving.  When managing stairs, she states that she leads with her right leg because she doesn't trust her left leg.    Past Medical History:  · Abnormal ECG    · Anemia    · Arthritis    · Cholelithiasis    · Delayed emergence from anesthesia    · Depression    · DEXA    · Low  back pain    · MAMMO    · Meniere's Rt ear    · Neck pain, acute    · PAP    · Positive VEENA (antinuclear antibody)    · Thoracic disc disorder 25    Past Surgical History:  · CATARACT EXTRACTION, BILATERAL      · COLONOSCOPY        = NEG, rech        GSI  · DIAGNOSTIC LAPAROSCOPY        for poss Tubal Preg  · DILATATION AND CURETTAGE      · GANGLION CYST EXCISION Right      wrist  · LASIK Bilateral    · LUMBAR DISCECTOMY Left 5/15/2024    Procedure: Left lumbar 4 5 far lateral microdiscectomy;  Surgeon: Colin Bonilla IV, MD;   · VAGINAL DELIVERY         -  X3  · WISDOM TOOTH EXTRACTION            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  Pain scale at lowest: 4-5.  At worst pain ratin (since surgery)  Location: lower back and into L LE  Quality: continuous ache and burning.  Relieving factors: medications and change in position  Aggravating factors: sleeping, prolonged positioning and repetitive movement    Social Support  Lives in: one-story house  Lives with: significant other    Treatments  Current treatment: medication and physical therapy  Patient Goals  Patient goals for therapy: decreased pain, improved balance, increased motion, increased strength and return to work         Objective        Special Questions  Patient is experiencing disturbed sleep.       Static Posture   General Observations  Symmetrical weight bearing and guarded.     Comments  L sided lumbar scar, 1 inch, slightly thickened and mild swelling at proximal end.  No signs of redness, drainage, or infection.    Palpation   Left   Hypertonic in the lumbar paraspinals.   Tenderness of the lumbar paraspinals.     Neurological Testing     Sensation     Lumbar   Left   Diminished: light touch    Active Range of Motion     Additional Active Range of Motion Details  Lumbar AROM limited due to post-op status and  bend/lift/twist restrictions.    Passive Range of Motion   Left Hip   Flexion: 90 degrees   Extension: 0 degrees   External rotation (90/90): 30 degrees   Internal rotation (90/90): 10 degrees     Strength/Myotome Testing     Left Hip   Planes of Motion   Flexion: 2+ (unable to perform SLR)  Extension: 3-  Abduction: 3+    Right Hip   Planes of Motion   Flexion: 5  Extension: 4  Abduction: 4  Adduction: 4+    Left Knee   Flexion: 4-  Extension: 3    Right Knee   Flexion: 5  Extension: 5    Left Ankle/Foot   Dorsiflexion: 3  Plantar flexion: 3    Right Ankle/Foot   Dorsiflexion: 5  Plantar flexion: 4    Tests     Lumbar     Left   Positive passive SLR.     Lumbar Flexibility Comments:   Significant tightness in L>R hamstrings    Ambulation   Weight-Bearing Status   Assistive device used: none    Ambulation: Stairs   Pattern: non-reciprocal  Pattern: non-reciprocal  Curbs: independent    Observational Gait   Decreased walking speed and stride length.       Patient Education: Discussed treatment plan and initiated HEP with handout and [employer] access code: [email or web address]    Assessment & Plan       Assessment  Impairments: abnormal gait, abnormal muscle tone, abnormal or restricted ROM, activity intolerance, impaired balance, impaired physical strength, lacks appropriate home exercise program, pain with function and weight-bearing intolerance   Functional limitations: carrying objects, lifting, sleeping, walking, uncomfortable because of pain, moving in bed, sitting, standing, reaching overhead and unable to perform repetitive tasks   Assessment details: The patient is a 64 y.o. female who presents to physical therapy today for treatment following her L4/5 lateral microdiscectomy on 5/15/24.  She is currently 3 weeks post-op. Upon initial evaluation, the patient demonstrates the following impairments: lower back and leg pain, LE weakness, difficulty with position changes, decreased tolerance to prolonged sitting and  standing, decreased tolerance to weightbearing. Due to these impairments, the patient is unable to sleep through the night and she hasn't been able to return to work due to physical requirements of her job and prolonged sitting. The patient would benefit from skilled PT services to address functional limitations and impairments and to improve patient quality of life.    Barriers to therapy: Return to work status  Prognosis: good    Goals  Plan Goals: STG's: 3 weeks  Patient will report a decrease in pain by 25%   Patient will be able to pick a light (<3#) object off the floor without an increase in pain  Patient will be able to perform HEP with minimal verbal cues    LTG's: By discharge  Patient will report a decrease in pain by 65%  Patient will report an elimination of radicular symptoms  Patient will demonstrate an improvement in overall function as measured by an improvement in the Oswestry Disability Index score by >/= 7 points   Patient will be able to sleep > 5 hours without waking from pain  Patient will be able to tolerate standing for > 25 minutes without increased pain  Patient will be able to tolerate sitting for > 60 minutes without increased pain  Patient will be able to ambulate community distances without increased pain  Patient will be independent with final HEP     Plan  Therapy options: will be seen for skilled therapy services  Planned modality interventions: cryotherapy, electrical stimulation/Russian stimulation, TENS and thermotherapy (hydrocollator packs)  Planned therapy interventions: abdominal trunk stabilization, balance/weight-bearing training, body mechanics training, flexibility, functional ROM exercises, gait training, home exercise program, manual therapy, neuromuscular re-education, postural training, soft tissue mobilization, spinal/joint mobilization, strengthening, stretching, therapeutic activities and joint mobilization  Frequency: 3x week  Duration in weeks: 12  Treatment plan  discussed with: patient      History # of Personal Factors and/or Comorbidities: MODERATE (1-2)  Examination of Body System(s): # of elements: MODERATE (3)  Clinical Presentation: EVOLVING  Clinical Decision Making: MODERATE      Timed:         Manual Therapy:    10     mins  47199;     Therapeutic Exercise:    20     mins  19721;     Neuromuscular Vandana:        mins  89756;    Therapeutic Activity:          mins  79506;     Gait Training:           mins  10208;     Ultrasound:          mins  68685;    Ionto                                   mins   88937  Self Care                            mins   19088    Un-Timed:  Electrical Stimulation:         mins  03092 ( );  Canalith Repos         mins 92209  Dry Needling          mins 04334/36719  Traction          mins 51127  Low Eval          Mins  45945  Mod Eval     25     Mins  72934  High Eval                            Mins  65161    No Charge:   Cryopack                         mins  Moist Heat                       mins        Timed Treatment:   30   mins   Total Treatment:     55   mins        PT SIGNATURE: Reshma Odell PT   [place] License: [id number]  DATE TREATMENT INITIATED: 6/5/2024    Initial Certification  Certification Period: 6/5/2024 through 9/2/2024  I certify that the therapy services are furnished while this patient is under my care.  The services outlined above are required by this patient, and will be reviewed every 90 days.      Physician Signature: _________________________  PHYSICIAN: Calvin Bridges PA   NPI: [id number]                                             DATE: _____________________________________    Please sign and return via fax to [phone]. Thank you, [facility].

## 2024-06-07 ENCOUNTER — TREATMENT (OUTPATIENT)
Dept: PHYSICAL THERAPY | Facility: CLINIC | Age: 64
End: 2024-06-07
Payer: COMMERCIAL

## 2024-06-07 DIAGNOSIS — M54.16 ACUTE LUMBAR RADICULOPATHY: ICD-10-CM

## 2024-06-07 DIAGNOSIS — M51.36 DDD (DEGENERATIVE DISC DISEASE), LUMBAR: Primary | ICD-10-CM

## 2024-06-07 NOTE — PROGRESS NOTES
Physical Therapy Daily Treatment Note  [address] [place] [address]    Patient: Ela Paulino  : 1960  Referring practitioner: DEBRA Moise  Date of Initial Visit: Type: THERAPY  Noted: 2024  Today's Date: 2024  Patient seen for 2 sessions      Visit Diagnoses:    ICD-10-CM ICD-9-CM   1. DDD (degenerative disc disease), lumbar  M51.36 722.52   2. Acute lumbar radiculopathy  M54.16 724.4       VISIT#: 2    Subjective   Ela Paulino reports that she was really fatigued after her last visit.  She states that he is sore through her lower back today.  Pain Rating (0-10): 6    Objective     See Exercise, Manual, and Modality Logs for complete treatment.     Patient Education: Continue current HEP    Assessment & Plan       Assessment  Assessment details: Patient presented to physical therapy with moderate lower back and L LE pain.  She continues with significant LE weakness with inability to perform independent SLR on L.  She responded well to initiation of treatment with manual therapy focusing on myofascial release and soft tissue mobilization.   Exercises were progressed for core stabilization and LE strengthening.          Progress per Plan of Care and Progress strengthening /stabilization /functional activity        Timed:         Manual Therapy:    15     mins  37845;     Therapeutic Exercise:    23     mins  08820;     Neuromuscular Vandana:        mins  38683;    Therapeutic Activity:          mins  92593;     Gait Training:           mins  16514;     Ultrasound:          mins  90368;    Ionto                                   mins   90856  Self Care                            mins   65544    Un-Timed:  Electrical Stimulation:         mins  25609 ( );  Canalith Repos         mins 40840  Dry Needling          mins 71230/77334  Traction          mins 69395  Re-Eval                               mins  97515    No Charge:   Cryopack                          mins  Moist Heat                       mins    Timed Treatment:   38   mins   Total Treatment:     38   mins        Reshma Odell PT  Physical Therapist  [place] License: [id number]

## 2024-06-10 ENCOUNTER — TREATMENT (OUTPATIENT)
Dept: PHYSICAL THERAPY | Facility: CLINIC | Age: 64
End: 2024-06-10
Payer: COMMERCIAL

## 2024-06-10 DIAGNOSIS — M51.36 DDD (DEGENERATIVE DISC DISEASE), LUMBAR: Primary | ICD-10-CM

## 2024-06-10 DIAGNOSIS — M54.16 ACUTE LUMBAR RADICULOPATHY: ICD-10-CM

## 2024-06-10 PROCEDURE — 97110 THERAPEUTIC EXERCISES: CPT | Performed by: PHYSICAL THERAPIST

## 2024-06-10 PROCEDURE — 97140 MANUAL THERAPY 1/> REGIONS: CPT | Performed by: PHYSICAL THERAPIST

## 2024-06-10 NOTE — PROGRESS NOTES
Physical Therapy Daily Treatment Note  [address] [place] [address]    Patient: Ela Paulino  : 1960  Referring practitioner: DEBRA Moise  Date of Initial Visit: Type: THERAPY  Noted: 2024  Today's Date: 6/10/2024  Patient seen for 3 sessions      Visit Diagnoses:    ICD-10-CM ICD-9-CM   1. DDD (degenerative disc disease), lumbar  M51.36 722.52   2. Acute lumbar radiculopathy  M54.16 724.4       VISIT#: 3    Subjective   Ela Paulino reports that she feels really stiff and sore in the mornings.  She has been working on her exercises daily.  She states that she has had more burning in her L calf today, like her feeling is coming back.  Pain Rating (0-10): 'burning'    Objective     See Exercise, Manual, and Modality Logs for complete treatment.     Patient Education: continue with current HEP, can increased repetitions as tolerated    Assessment & Plan       Assessment  Assessment details: Patient presented with increased sensation in left lower leg.  She was able to initiate independent SLR on L but needed light therapist assist for completion of SLRs on L LE.  She was able to tolerate increase in number of repetitions and exercises completed.  She did have mild increased fatigue after today's exercise session.         Progress per Plan of Care and Progress strengthening /stabilization /functional activity        Timed:         Manual Therapy:    20     mins  11789;     Therapeutic Exercise:    25     mins  34531;     Neuromuscular Vandana:        mins  85174;    Therapeutic Activity:          mins  60193;     Gait Training:           mins  94677;     Ultrasound:          mins  75087;    Ionto                                   mins   83702  Self Care                            mins   80811    Un-Timed:  Electrical Stimulation:         mins  67854 ( );  Canalith Repos         mins 58148  Dry Needling          mins 52251/  Traction          mins  08687  Re-Eval                               mins  51692    No Charge:   Cryopack                         mins  Moist Heat                       mins    Timed Treatment:   45   mins   Total Treatment:     45   mins        Reshma Odell PT  Physical Therapist  [place] License: [id number]

## 2024-06-11 ENCOUNTER — TREATMENT (OUTPATIENT)
Dept: PHYSICAL THERAPY | Facility: CLINIC | Age: 64
End: 2024-06-11
Payer: COMMERCIAL

## 2024-06-11 DIAGNOSIS — M54.16 ACUTE LUMBAR RADICULOPATHY: ICD-10-CM

## 2024-06-11 DIAGNOSIS — M51.36 DDD (DEGENERATIVE DISC DISEASE), LUMBAR: Primary | ICD-10-CM

## 2024-06-11 NOTE — PROGRESS NOTES
Physical Therapy Daily Treatment Note  [address] [place] [address]    Patient: Ela Paulino  : 1960  Referring practitioner: DEBRA Moise  Date of Initial Visit: Type: THERAPY  Noted: 2024  Today's Date: 2024  Patient seen for 4 sessions      Visit Diagnoses:    ICD-10-CM ICD-9-CM   1. DDD (degenerative disc disease), lumbar  M51.36 722.52   2. Acute lumbar radiculopathy  M54.16 724.4       VISIT#: 4    Subjective   Ela Paulino reports that she was really sore and fatigued after her last PT session.  She states that she has been feeling more tingling and pain in her lower leg.  Pain Rating (0-10): 8    Objective     See Exercise, Manual, and Modality Logs for complete treatment.     Patient Education: Continue current HEP     Assessment & Plan       Assessment  Assessment details: Patient presented with increase in lower back pain and overall soreness.  Treatment was focused on manual therapy to address soft tissue restrictions and gentle ROM with continued core stabilization.  Patient reported further reduction in overall pain/soreness with use of modalities for pain reduction.         Progress per Plan of Care and Progress strengthening /stabilization /functional activity          Timed:         Manual Therapy:    20     mins  08308;     Therapeutic Exercise:    15     mins  53215;     Neuromuscular Vandana:        mins  32434;    Therapeutic Activity:          mins  74814;     Gait Training:           mins  80534;     Ultrasound:          mins  86255;    Ionto                                   mins   18852  Self Care                            mins   54032    Un-Timed:  Electrical Stimulation:    15     mins  80847 ( );  Canalith Repos         mins 98467  Dry Needling          mins 78395/11357  Traction          mins 58302  Re-Eval                               mins  58723    No Charge:   Cryopack                         mins  Moist Heat                        mins    Timed Treatment:   35   mins   Total Treatment:     50   mins        Reshma Odell PT  Physical Therapist  [place] License: [id number]

## 2024-06-14 ENCOUNTER — TREATMENT (OUTPATIENT)
Dept: PHYSICAL THERAPY | Facility: CLINIC | Age: 64
End: 2024-06-14
Payer: COMMERCIAL

## 2024-06-14 DIAGNOSIS — M54.16 ACUTE LUMBAR RADICULOPATHY: ICD-10-CM

## 2024-06-14 DIAGNOSIS — M51.36 DDD (DEGENERATIVE DISC DISEASE), LUMBAR: Primary | ICD-10-CM

## 2024-06-14 NOTE — PROGRESS NOTES
Physical Therapy Daily Treatment Note  [address] [place] [address]    Patient: Ela Paulino  : 1960  Referring practitioner: DEBRA Moise  Date of Initial Visit: Type: THERAPY  Noted: 2024  Today's Date: 2024  Patient seen for 5 sessions      Visit Diagnoses:    ICD-10-CM ICD-9-CM   1. DDD (degenerative disc disease), lumbar  M51.36 722.52   2. Acute lumbar radiculopathy  M54.16 724.4       VISIT#: 5    Subjective   Ela Paulino reports that she had been reducing her gabapentin and wondered if that was causing her increased pain.  She has gone back to twice per day and her pain is decreasing.  She still feels pretty sore and she is trying to prepare her back to return to work on the .   Pain Rating (0-10): 5    Objective     See Exercise, Manual, and Modality Logs for complete treatment.     Patient Education: Discussed continued treatment plan and progression of core stabilization exercises    Assessment & Plan       Assessment  Assessment details: Patient presented with decreased gait speed and increased guarding with position changes and movements.  She had less tightness throughout lumbar paraspinals today and was still able to tolerate gentle ROM and light core stabilization.  Modalities were continued for further pain reduction.   Patient reported improvement after today's treatment session.       Progress per Plan of Care and Progress strengthening /stabilization /functional activity          Timed:         Manual Therapy:    15     mins  89915;     Therapeutic Exercise:    20     mins  48225;     Neuromuscular Vandana:        mins  40905;    Therapeutic Activity:          mins  61562;     Gait Training:           mins  86336;     Ultrasound:          mins  58360;    Ionto                                   mins   40373  Self Care                            mins   91362    Un-Timed:  Electrical Stimulation:    15     mins  19225 (  );  Canalith Repos         mins 02820  Dry Needling          mins 38355/11076  Traction          mins 34799  Re-Eval                               mins  52689    No Charge:   Cryopack                         mins  Moist Heat                       mins    Timed Treatment:   35   mins   Total Treatment:     50   mins        Reshma Odell PT  Physical Therapist  [place] License: [id number]

## 2024-06-17 ENCOUNTER — TREATMENT (OUTPATIENT)
Dept: PHYSICAL THERAPY | Facility: CLINIC | Age: 64
End: 2024-06-17
Payer: COMMERCIAL

## 2024-06-17 DIAGNOSIS — M54.16 ACUTE LUMBAR RADICULOPATHY: ICD-10-CM

## 2024-06-17 DIAGNOSIS — M51.36 DDD (DEGENERATIVE DISC DISEASE), LUMBAR: Primary | ICD-10-CM

## 2024-06-17 NOTE — PROGRESS NOTES
Physical Therapy Daily Treatment Note  [id number] [place] [address]    Patient: Ela Paulino  : 1960  Referring practitioner: DEBRA Moise  Date of Initial Visit: Type: THERAPY  Noted: 2024  Today's Date: 2024  Patient seen for 6 sessions      Visit Diagnoses:    ICD-10-CM ICD-9-CM   1. DDD (degenerative disc disease), lumbar  M51.36 722.52   2. Acute lumbar radiculopathy  M54.16 724.4       VISIT#: 6    Subjective   Ela Paulino reports that her back is achy, her thigh feels weird, and her lower leg is numb.  She states that she is trying to be ready to get back to work in the next few weeks.  Pain Rating (0-10): 5    Objective     See Exercise, Manual, and Modality Logs for complete treatment.     Patient Education: Continue current HEP    Assessment & Plan       Assessment  Assessment details: Patient presents to physical therapy with continued lower back soreness and L LE numbness.  She is guarded and slow with her gait today.  She was able to increased repetitions with her exercises and tolerated further progression of strengthening exercises with use of resistance bands.  Modalities were continued for further pain reduction.      Progress per Plan of Care and Progress strengthening /stabilization /functional activity          Timed:         Manual Therapy:    15     mins  54731;     Therapeutic Exercise:    23     mins  45889;     Neuromuscular Vandana:        mins  36498;    Therapeutic Activity:          mins  25082;     Gait Training:           mins  22731;     Ultrasound:          mins  79438;    Ionto                                   mins   10667  Self Care                            mins   39658    Un-Timed:  Electrical Stimulation:    15     mins  42439 ( );  Canalith Repos         mins 73413  Dry Needling          mins 86867/55036  Traction          mins 03774  Re-Eval                               mins  09095    No Charge:   Cryopack                          mins  Moist Heat                       mins    Timed Treatment:   38   mins   Total Treatment:     53   mins        Reshma Odell PT  Physical Therapist  [place] License: [id number]

## 2024-06-19 ENCOUNTER — TREATMENT (OUTPATIENT)
Dept: PHYSICAL THERAPY | Facility: CLINIC | Age: 64
End: 2024-06-19
Payer: COMMERCIAL

## 2024-06-19 DIAGNOSIS — M51.36 DDD (DEGENERATIVE DISC DISEASE), LUMBAR: Primary | ICD-10-CM

## 2024-06-19 DIAGNOSIS — M54.16 ACUTE LUMBAR RADICULOPATHY: ICD-10-CM

## 2024-06-19 NOTE — PROGRESS NOTES
Physical Therapy Daily Treatment Note  [address] [place] [address]    Patient: Ela Paulino  : 1960  Referring practitioner: DEBRA Moise  Date of Initial Visit: Type: THERAPY  Noted: 2024  Today's Date: 2024  Patient seen for 7 sessions      Visit Diagnoses:    ICD-10-CM ICD-9-CM   1. DDD (degenerative disc disease), lumbar  M51.36 722.52   2. Acute lumbar radiculopathy  M54.16 724.4       VISIT#: 7    Subjective   Ela Paulino reports that she is doing pretty well.  She tried a special mat at the chiropractor that helps facilitate nerve healing. She states that something has helped this week as she is feeling a little more sensation in her left lower leg.   Pain Rating (0-10): 5    Objective     See Exercise, Manual, and Modality Logs for complete treatment.     Patient Education: Continue current HEP    Assessment & Plan       Assessment  Assessment details: Focused increased time today with exercise progression for further core stabilization.  Modalities were held today to see how patient responds to treatment without E-stim.  She continues to have guarding with position changes on and off the table but she is ambulating with improved posture and increased speed.         Progress per Plan of Care and Progress strengthening /stabilization /functional activity          Timed:         Manual Therapy:    15     mins  58847;     Therapeutic Exercise:    15     mins  83970;     Neuromuscular Vandana:    15    mins  89666;    Therapeutic Activity:          mins  44921;     Gait Training:           mins  71571;     Ultrasound:          mins  00851;    Ionto                                   mins   03596  Self Care                            mins   31494    Un-Timed:  Electrical Stimulation:         mins  06450 ( );  Canalith Repos         mins 46233  Dry Needling          mins 23159/  Traction          mins 34860  Re-Eval                                mins  46891    No Charge:   Cryopack                         mins  Moist Heat                       mins    Timed Treatment:   45   mins   Total Treatment:     45   mins        Reshma Odell PT  Physical Therapist  [place] License: [id number]

## 2024-06-21 ENCOUNTER — TREATMENT (OUTPATIENT)
Dept: PHYSICAL THERAPY | Facility: CLINIC | Age: 64
End: 2024-06-21
Payer: COMMERCIAL

## 2024-06-21 DIAGNOSIS — M51.36 DDD (DEGENERATIVE DISC DISEASE), LUMBAR: Primary | ICD-10-CM

## 2024-06-21 DIAGNOSIS — M54.16 ACUTE LUMBAR RADICULOPATHY: ICD-10-CM

## 2024-06-21 NOTE — PROGRESS NOTES
Physical Therapy Daily Treatment Note  [address] [place] [address]    Patient: Ela Paulino  : 1960  Referring practitioner: DEBRA Moise  Date of Initial Visit: Type: THERAPY  Noted: 2024  Today's Date: 2024  Patient seen for 8 sessions      Visit Diagnoses:    ICD-10-CM ICD-9-CM   1. DDD (degenerative disc disease), lumbar  M51.36 722.52   2. Acute lumbar radiculopathy  M54.16 724.4       VISIT#: 8    Subjective   Ela Paulino reports that she more sore and fatigued today.  She states that she was up and down stairs a little more yesterday but she doesn't recall any other trigger for her increased soreness.  She is scheduled to return to work next week on 24.  Pain Rating (0-10): 7    Objective     See Exercise, Manual, and Modality Logs for complete treatment.     Patient Education: Discussed return to work and pacing of activities.  Educated on proper desk ergonomics.     Assessment & Plan       Assessment  Assessment details: Patient presented with increased soreness but was able to fully participate in treatment.  She reported reduction in pain after treatment.  She would benefit from further education regarding proper body mechanics and posture with return to work next week.         Progress per Plan of Care and Progress strengthening /stabilization /functional activity          Timed:         Manual Therapy:    15     mins  37519;     Therapeutic Exercise:    15     mins  21294;     Neuromuscular Vandana:    15    mins  60638;    Therapeutic Activity:          mins  05421;     Gait Training:           mins  74763;     Ultrasound:          mins  42505;    Ionto                                   mins   51397  Self Care                            mins   06882    Un-Timed:  Electrical Stimulation:         mins  62246 ( );  Canalith Repos         mins 87668  Dry Needling          mins 49494/  Traction          mins 80430  Re-Eval                                mins  80438    No Charge:   Cryopack                         mins  Moist Heat                       mins    Timed Treatment:   45   mins   Total Treatment:     45   mins        Reshma Odell PT  Physical Therapist  [place] License: [id number]

## 2024-06-24 ENCOUNTER — TREATMENT (OUTPATIENT)
Dept: PHYSICAL THERAPY | Facility: CLINIC | Age: 64
End: 2024-06-24
Payer: COMMERCIAL

## 2024-06-24 DIAGNOSIS — M51.36 DDD (DEGENERATIVE DISC DISEASE), LUMBAR: Primary | ICD-10-CM

## 2024-06-24 DIAGNOSIS — M54.16 ACUTE LUMBAR RADICULOPATHY: ICD-10-CM

## 2024-06-24 NOTE — PROGRESS NOTES
Physical Therapy Daily Treatment Note  [address] [place] [address]    Patient: Ela Paulino  : 1960  Referring practitioner: DEBRA Moise  Date of Initial Visit: Type: THERAPY  Noted: 2024  Today's Date: 2024  Patient seen for 9 sessions      Visit Diagnoses:    ICD-10-CM ICD-9-CM   1. DDD (degenerative disc disease), lumbar  M51.36 722.52   2. Acute lumbar radiculopathy  M54.16 724.4       VISIT#: 9    Subjective   Ela Paulino reports that she is doing really well today.  She feels like she has finally turned a corner.  She still has numbness and weakness in her L leg but she is more encouraged in her progress today.  She plans on returning to work in 2 days.  She states that she was able to go for a 20 minute walk yesterday evening.  Pain Rating (0-10): 4    Objective     See Exercise, Manual, and Modality Logs for complete treatment.     Patient Education: Continue current HEP    Assessment & Plan       Assessment  Assessment details: Patient is reporting a significant reduction in overall pain but continues to have weakness and numbness in her L leg.  She is reporting improved standing/walking tolerance.  Will further progress core strengthening next visit and update HEP with preparation for patient to return to work.         Progress per Plan of Care and Progress strengthening /stabilization /functional activity          Timed:         Manual Therapy:    15     mins  12085;     Therapeutic Exercise:    15     mins  65539;     Neuromuscular Vandana:    15    mins  56931;    Therapeutic Activity:          mins  92193;     Gait Training:           mins  58775;     Ultrasound:          mins  90392;    Ionto                                   mins   51352  Self Care                            mins   68558    Un-Timed:  Electrical Stimulation:         mins  07365 ( );  Canalith Repos         mins 93712  Dry Needling          mins 95551/  Traction           mins 20605  Re-Eval                               mins  12965    No Charge:   Cryopack                         mins  Moist Heat                       mins    Timed Treatment:   45   mins   Total Treatment:     45   mins        Reshma Odell PT  Physical Therapist  [place] License: [id number]

## 2024-06-25 ENCOUNTER — TREATMENT (OUTPATIENT)
Dept: PHYSICAL THERAPY | Facility: CLINIC | Age: 64
End: 2024-06-25
Payer: COMMERCIAL

## 2024-06-25 DIAGNOSIS — M51.36 DDD (DEGENERATIVE DISC DISEASE), LUMBAR: Primary | ICD-10-CM

## 2024-06-25 DIAGNOSIS — M54.16 ACUTE LUMBAR RADICULOPATHY: ICD-10-CM

## 2024-06-25 NOTE — PROGRESS NOTES
Physical Therapy Daily Treatment Note  [address] [place] [address]    Patient: Ela Paulino  : 1960  Referring practitioner: DEBRA Moise  Date of Initial Visit: Type: THERAPY  Noted: 2024  Today's Date: 2024  Patient seen for 10 sessions      Visit Diagnoses:    ICD-10-CM ICD-9-CM   1. DDD (degenerative disc disease), lumbar  M51.36 722.52   2. Acute lumbar radiculopathy  M54.16 724.4       VISIT#: 10    Subjective   Ela Paulino reports that she is a little more sore today but she was able to go for another long walk yesterday evening.  Pain Rating (0-10): 5    Objective     See Exercise, Manual, and Modality Logs for complete treatment.     Patient Education: Discussed continued progression of exercises    Assessment & Plan       Assessment  Assessment details: Patient presented with slight increase in overall lower back pain/soreness.  She was able to tolerate further progression of core stabilization and strengthening exercises.   She no longer requires modalities post-exercise for pain reduction.  She is tolerating increased weightbearing activities overall.          Progress per Plan of Care and Progress strengthening /stabilization /functional activity          Timed:         Manual Therapy:    15     mins  60397;     Therapeutic Exercise:    15     mins  67947;     Neuromuscular Vandana:    15    mins  44902;    Therapeutic Activity:          mins  50772;     Gait Training:           mins  11017;     Ultrasound:          mins  01384;    Ionto                                   mins   48193  Self Care                            mins   13835    Un-Timed:  Electrical Stimulation:         mins  58978 (MC );  Traction          mins 89176  Re-Eval                               mins  39606    No Charge:   Cryopack                         mins  Moist Heat                       mins    Timed Treatment:   45   mins   Total Treatment:     45    suzie Odell, PT  Physical Therapist  [place] License: [id number]

## 2024-07-02 ENCOUNTER — TREATMENT (OUTPATIENT)
Dept: PHYSICAL THERAPY | Facility: CLINIC | Age: 64
End: 2024-07-02
Payer: COMMERCIAL

## 2024-07-02 DIAGNOSIS — M54.16 ACUTE LUMBAR RADICULOPATHY: ICD-10-CM

## 2024-07-02 DIAGNOSIS — M51.36 DDD (DEGENERATIVE DISC DISEASE), LUMBAR: Primary | ICD-10-CM

## 2024-07-02 NOTE — PROGRESS NOTES
Physical Therapy Daily Treatment Note/ Progress Note  [address] [place] [address]    Patient: Ela Paulino  : 1960  Referring practitioner: DEBRA Moise  Date of Initial Visit: Type: THERAPY  Noted: 2024  Today's Date: 2024  Patient seen for 11 sessions      Visit Diagnoses:    ICD-10-CM ICD-9-CM   1. DDD (degenerative disc disease), lumbar  M51.36 722.52   2. Acute lumbar radiculopathy  M54.16 724.4       VISIT#: 11    Subjective   Ela Paulino reports to physical therapy following her recent return to work last week.  She states that she has had a recent worsening of her pain and she was afraid that something was wrong with her back.  She even messaged her surgeon and she is waiting to hear a response from his office.  She states that it has been rough trying to work full days at work.  She states that her pain was so bad that she would've rated it 20/10 yesterday.  Today, her pain is lower but still pretty intense.  She has been using heat and resting.  Pain Rating (0-10): 8/10  Modified Oswestry Back Index: 21/50 points (previously 21/50 points)    Objective        Special Questions  Patient is experiencing disturbed sleep.       Static Posture   General Observations  Symmetrical weight bearing and guarded.     Palpation   Left   Hypertonic in the lumbar paraspinals and quadratus lumborum.   Tenderness of the lumbar paraspinals and quadratus lumborum.     Neurological Testing     Sensation     Lumbar   Left   Diminished: light touch    Active Range of Motion     Additional Active Range of Motion Details  Lumbar AROM limited due to post-op status and bend/lift/twist restrictions.    Passive Range of Motion   Left Hip   Flexion: 90 degrees   Extension: 0 degrees   External rotation (90/90): 30 degrees   Internal rotation (90/90): 10 degrees     Strength/Myotome Testing     Left Hip   Planes of Motion   Flexion: 3  Extension: 3+  Abduction: 4  Adduction:  4+    Right Hip   Planes of Motion   Flexion: 5  Extension: 4  Abduction: 4  Adduction: 4+    Left Knee   Flexion: 4  Extension: 4-    Right Knee   Flexion: 5  Extension: 5    Left Ankle/Foot   Dorsiflexion: 4  Plantar flexion: 3+    Right Ankle/Foot   Dorsiflexion: 5  Plantar flexion: 4    Ambulation   Weight-Bearing Status   Assistive device used: none    Ambulation: Stairs   Pattern: reciprocal  Pattern: reciprocal  Curbs: independent        See Exercise, Manual, and Modality Logs for complete treatment.     Patient Education: Discussed easy return to prior level of activity with recent return to work    Assessment & Plan       Assessment  Impairments: abnormal gait, abnormal muscle tone, abnormal or restricted ROM, activity intolerance, impaired physical strength, lacks appropriate home exercise program, pain with function and weight-bearing intolerance   Functional limitations: carrying objects, lifting, sleeping, walking, uncomfortable because of pain, moving in bed, sitting, standing, reaching overhead and unable to perform repetitive tasks   Assessment details: The patient is a 64 y.o. female who presented to physical therapy for treatment following her L4/5 lateral microdiscectomy on 5/15/24.  She is currently 6 weeks and 6 days post-op. She remains guarded with overall ROM and flexibility.  She has been able to return to work but has had recent flare of pain.  She presented today with increased muscle tone throughout lumbar paraspinals.  She responded well to manual therapy and gentle ROM/stretching today.  Progressive exercises were held today to address recent flare of pain.  Upon today's reassessment, the patient demonstrates the continued impairments: lower back and leg pain, LE weakness, difficulty with position changes, decreased tolerance to prolonged sitting and standing, decreased tolerance to weightbearing. Due to these impairments, the patient is still having difficulty with sleep and her return  to work has been difficult. The patient would benefit from skilled PT services to address functional limitations and impairments and to improve patient quality of life.    Barriers to therapy: Return to work status  Prognosis: good    Goals  Plan Goals: STG's: 3 weeks  Patient will report a decrease in pain by 25% - MET  Patient will be able to pick a light (<3#) object off the floor without an increase in pain- PARTIALLY MET  Patient will be able to perform HEP with minimal verbal cues- MET    LTG's: By discharge  Patient will report a decrease in pain by 65%- PARTIALLY MET  Patient will report an elimination of radicular symptoms- PARTIALLY MET  Patient will demonstrate an improvement in overall function as measured by an improvement in the Oswestry Disability Index score by >/= 7 points - NOT MET,  21/50 points (previously 21/50 points)  Patient will be able to sleep > 5 hours without waking from pain- PARTIALLY MET  Patient will be able to tolerate standing for > 25 minutes without increased pain- PARTIALLY MET  Patient will be able to tolerate sitting for > 60 minutes without increased pain- PARTIALLY MET  Patient will be able to ambulate community distances without increased pain- PARTIALLY MET  Patient will be independent with final HEP - PARTIALLY MET    Plan  Therapy options: will be seen for skilled therapy services  Planned modality interventions: cryotherapy, electrical stimulation/Russian stimulation, TENS and thermotherapy (hydrocollator packs)  Planned therapy interventions: abdominal trunk stabilization, balance/weight-bearing training, body mechanics training, flexibility, functional ROM exercises, gait training, home exercise program, manual therapy, neuromuscular re-education, postural training, soft tissue mobilization, spinal/joint mobilization, strengthening, stretching, therapeutic activities and joint mobilization  Frequency: 2x week  Duration in weeks: 6  Treatment plan discussed with:  patient      Progress per Plan of Care and Progress strengthening /stabilization /functional activity          Timed:         Manual Therapy:    23     mins  52178;     Therapeutic Exercise:    20     mins  33451;     Neuromuscular Vandana:        mins  61947;    Therapeutic Activity:          mins  56289;     Gait Training:           mins  62666;     Ultrasound:          mins  36830;    Ionto                                   mins   69280  Self Care                            mins   96699    Un-Timed:  Electrical Stimulation:         mins  55086 ( );  Traction          mins 06809  Re-Eval                               mins  78874    No Charge:   Cryopack                         mins  Moist Heat                       mins    Timed Treatment:   43   mins   Total Treatment:     43   mins        Reshma Odell PT  Physical Therapist  [place] License: [id number]

## 2024-07-03 RX ORDER — METHYLPREDNISOLONE 4 MG/1
TABLET ORAL
Qty: 21 TABLET | Refills: 0 | Status: SHIPPED | OUTPATIENT
Start: 2024-07-03

## 2024-07-10 ENCOUNTER — TREATMENT (OUTPATIENT)
Dept: PHYSICAL THERAPY | Facility: CLINIC | Age: 64
End: 2024-07-10
Payer: COMMERCIAL

## 2024-07-10 DIAGNOSIS — M54.16 ACUTE LUMBAR RADICULOPATHY: ICD-10-CM

## 2024-07-10 DIAGNOSIS — M51.36 DDD (DEGENERATIVE DISC DISEASE), LUMBAR: Primary | ICD-10-CM

## 2024-07-10 NOTE — PROGRESS NOTES
Physical Therapy Treatment Note  [address] [place] [address]      Patient: Ela Paulino   : 1960  Diagnosis/ICD-10 Code:  DDD (degenerative disc disease), lumbar [M51.36]  Referring practitioner: DEBRA Moise  Date of Initial Visit: Type: THERAPY  Noted: 2024  Today's Date: 7/10/2024  Patient seen for 12 sessions           Visit Diagnoses:     ICD-10-CM ICD-9-CM   1. DDD (degenerative disc disease), lumbar  M51.36 722.52   2. Acute lumbar radiculopathy  M54.16 724.4       Subjective Ela Paulino reports: the doctor did get back to her about her increased pain and they gave her oral steroids and she feels like she could run a marathon. Pt has one more day left on the prescription and is hoping she continues to feel better once she's done with the meds.     Objective   Palpation: tender point with hypertonus noted L prox/med glut max; hypertonus L lumb PVs    See Exercise, Manual, and Modality Logs for complete treatment.     Patient Education: cues for therex; cues for form prn; instr to bring feet closer to her buttocks with khushbu    Assessment/Plan Reduced tenderness and hypertonus after manual today. Pt is challenged with SLR on the L and felt some pulling through the thor region with H/L khushbu. Pt with relief of trunk discomfort with bringing feet closer to buttocks.  Pt declined modalities at end of session.       Progress per Plan of Care and Progress strengthening /stabilization /functional activity            Timed:         Manual Therapy:   15      mins  01430;     Therapeutic Exercise:   23    mins  97778;     Neuromuscular Vandana:        mins  08131;    Therapeutic Activity:          mins  67211;     Gait Training:           mins  37854;     Ultrasound:          mins  80490;    Ionto                                   mins   33817  Self Care                            mins   90526    Un-Timed:  Electrical Stimulation:         mins  97513 (  );  Traction          mins 83854  Canalith Repos                   mins  98705  Dry Needle 1-2 ms      ___  mins 90065  Dry Needle  3+ ms              mins 26705  Low Eval          mins  36084  Mod Eval          Mins  42422  High Eval                            Mins  08964  Re-Eval                               mins  59905    Timed Treatment:   38   mins   Total Treatment:     38   mins          Flaca Cannon, PT    Physical Therapist

## 2024-07-15 ENCOUNTER — TREATMENT (OUTPATIENT)
Dept: PHYSICAL THERAPY | Facility: CLINIC | Age: 64
End: 2024-07-15
Payer: COMMERCIAL

## 2024-07-15 DIAGNOSIS — M51.36 DDD (DEGENERATIVE DISC DISEASE), LUMBAR: Primary | ICD-10-CM

## 2024-07-15 DIAGNOSIS — M54.16 ACUTE LUMBAR RADICULOPATHY: ICD-10-CM

## 2024-07-15 NOTE — PROGRESS NOTES
Physical Therapy Treatment Note  [address] [address]      Patient: Ela Paulino   : 1960  Diagnosis/ICD-10 Code:  DDD (degenerative disc disease), lumbar [M51.36]  Referring practitioner: DEBRA Moise  Date of Initial Visit: Type: THERAPY  Noted: 2024  Today's Date: 7/15/2024  Patient seen for 13 sessions           Visit Diagnoses:     ICD-10-CM ICD-9-CM   1. DDD (degenerative disc disease), lumbar  M51.36 722.52   2. Acute lumbar radiculopathy  M54.16 724.4       Subjective Ela Paulino reports: she's getting a little sore in the R lumb region lately. Pt with some tightness maria isabel L gt troch. Pt states still difficult with lifting the L LE for SLR.     Objective     See Exercise, Manual, and Modality Logs for complete treatment.     Patient Education: cues for therex    Assessment/Plan  Added R lumb region to manual with good tolerance. Pt continues to have difficulty with SLR and did still have some discomfort toward end of session, so requested modalities with good relief.     Progress per Plan of Care and Progress strengthening /stabilization /functional activity            Timed:         Manual Therapy:   15     mins  99576;     Therapeutic Exercise:    15     mins  92854;     Neuromuscular Vandana:        mins  19465;    Therapeutic Activity:          mins  55848;     Gait Training:           mins  22123;     Ultrasound:          mins  65412;    Ionto                                   mins   71137  Self Care                            mins   55887    Un-Timed:  Electrical Stimulation:   15      mins  81663 ( );  Traction          mins 79367  Canalith Repos                   mins  29761  Dry Needle 1-2 ms      ___  mins 61922  Dry Needle  3+ ms              mins 88579  Low Eval          mins  18417  Mod Eval          Mins  74954  High Eval                            Mins  64944  Re-Eval                               mins  16697    Timed  Treatment:  30    mins   Total Treatment:     45   mins          Flaca Cannon, PT    Physical Therapist

## 2024-07-17 ENCOUNTER — TREATMENT (OUTPATIENT)
Dept: PHYSICAL THERAPY | Facility: CLINIC | Age: 64
End: 2024-07-17
Payer: COMMERCIAL

## 2024-07-17 DIAGNOSIS — M54.16 ACUTE LUMBAR RADICULOPATHY: ICD-10-CM

## 2024-07-17 DIAGNOSIS — M51.36 DDD (DEGENERATIVE DISC DISEASE), LUMBAR: Primary | ICD-10-CM

## 2024-07-17 NOTE — PROGRESS NOTES
"Physical Therapy Daily Treatment Note  [facility]    Patient: Ela Paulino   : 1960  Referring practitioner: DEBRA Moise  Date of Initial Visit: Type: THERAPY  Noted: 2024  Today's Date: 2024  Patient seen for 14 sessions       Visit Diagnoses:    ICD-10-CM ICD-9-CM   1. DDD (degenerative disc disease), lumbar  M51.36 722.52   2. Acute lumbar radiculopathy  M54.16 724.4         Subjective:  Ela Paulino reports: Patient states improvements in low back symptoms, with improved muscle activation and strength of L lower extremity. \"I feel like I've been improving each week.\"      Objective   See Exercise, Manual, and Modality Logs for complete treatment.       Assessment:  Patient presents to clinic with self reported improvements in pain levels. Patient demonstrates improved SLR in supine with decreased assistance needed from the therapist. Patient tolerates all of today's movement interventions well without increase in pain levels. Patient denied pain modalities at the end of today's session.       Plan:   Continue to progress core stabilization, and progress to more functional activities if pain levels continue to be low.         Timed:         Manual Therapy:    15     mins  60158;     Therapeutic Exercise:    25     mins  04951;     Neuromuscular Vandana:        mins  39105;    Therapeutic Activity:          mins  84309;     Gait Training:           mins  69841;     Ultrasound:          mins  41972;    Ionto                                   mins  52522  Self Care                            mins  56260  Traction          mins 00747      Un-Timed:  Canalith Repos         mins 50998  Electrical Stimulation:         mins  51315 (MC );  Dry Needling          mins self-pay  Traction          mins 77221        Timed Treatment:   40   mins   Total Treatment:     40   mins    Sen Yates PT  License Number: [place] [id number]# [id number]. [place] LIC# [id number]    Physical Therapist        "

## 2024-07-22 ENCOUNTER — TREATMENT (OUTPATIENT)
Dept: PHYSICAL THERAPY | Facility: CLINIC | Age: 64
End: 2024-07-22
Payer: COMMERCIAL

## 2024-07-22 DIAGNOSIS — M51.36 DDD (DEGENERATIVE DISC DISEASE), LUMBAR: Primary | ICD-10-CM

## 2024-07-22 DIAGNOSIS — M54.16 ACUTE LUMBAR RADICULOPATHY: ICD-10-CM

## 2024-07-22 PROCEDURE — 97110 THERAPEUTIC EXERCISES: CPT

## 2024-07-22 PROCEDURE — 97530 THERAPEUTIC ACTIVITIES: CPT

## 2024-07-22 PROCEDURE — 97014 ELECTRIC STIMULATION THERAPY: CPT

## 2024-07-22 PROCEDURE — 97140 MANUAL THERAPY 1/> REGIONS: CPT

## 2024-07-22 NOTE — PROGRESS NOTES
"Physical Therapy Daily Treatment Note  [facility]  [place] - [address] [place], [place] [address]s [address] [address]    Phone: [phone]    Fax:  [phone]       Patient: Ela Paulino   : 1960  Referring practitioner: DEBRA Moise  Date of Initial Visit: Type: THERAPY  Noted: 2024  Today's Date: 2024  Patient seen for 15 sessions       Visit Diagnoses:    ICD-10-CM ICD-9-CM   1. DDD (degenerative disc disease), lumbar  M51.36 722.52   2. Acute lumbar radiculopathy  M54.16 724.4         Subjective:  Ela Paulnio reports: \"My steroid pack ran out late last week, and my back really started to hurt.\" Patient reports transitional movements tend to be the most aggravating.       Objective   See Exercise, Manual, and Modality Logs for complete treatment.       Assessment:  Patient presents with higher levels of low back pain. Treatment focused on gentle flexibility through lumbar spine and bilateral hip musculature, in combination with manual therapy treatments to address tissue extensibility of bilateral piriformis, and lumbar spinal erectors. Core strengthening regressed to hook-lying TrA activation, and then progress core activation with transitional movements for improvements in pain levels. Session ended with MHP and IFC, resulting in patient reported improvements in pain levels.       Plan:   Assess tolerance to previous session. Continue low level trunk and hip flexibility interventions. Reintroduce core exercises if pain levels are well managed.         Timed:         Manual Therapy:    20     mins  13631;     Therapeutic Exercise:    12     mins  05018;     Neuromuscular Vandana:        mins  80661;    Therapeutic Activity:     10     mins  72135;     Gait Training:           mins  45830;     Ultrasound:          mins  05756;    Ionto                                   mins  01967  Self Care                            mins  23498  Traction          mins " 51883      Un-Timed:  Canalith Repos         mins 02255  Electrical Stimulation:    15     mins  38673 ( );  Dry Needling          mins self-pay  Traction          mins 85371        Timed Treatment:   42   mins   Total Treatment:     57   mins    Sen Yates PT  License Number: [place] [id number]# [id number]. [place] LIC# [id number]    Physical Therapist

## 2024-07-23 ENCOUNTER — TELEPHONE (OUTPATIENT)
Dept: NEUROSURGERY | Facility: CLINIC | Age: 64
End: 2024-07-23
Payer: COMMERCIAL

## 2024-07-23 DIAGNOSIS — M51.36 DDD (DEGENERATIVE DISC DISEASE), LUMBAR: Primary | ICD-10-CM

## 2024-07-23 RX ORDER — GABAPENTIN 300 MG/1
300 CAPSULE ORAL 3 TIMES DAILY
Qty: 90 CAPSULE | Refills: 2 | Status: CANCELLED | OUTPATIENT
Start: 2024-07-23

## 2024-07-23 NOTE — TELEPHONE ENCOUNTER
Rx Refill Note  Requested Prescriptions     Pending Prescriptions Disp Refills    gabapentin (NEURONTIN) 300 MG capsule 90 capsule 2     Sig: Take 1 capsule by mouth 3 (Three) Times a Day.      Last office visit with prescribing clinician: 4/22/2024   Last telemedicine visit with prescribing clinician: Visit date not found   Next office visit with prescribing clinician: Visit date not found     Hemoglobin A1c (05/03/2024 10:30)   Comprehensive Metabolic Panel (04/22/2024 10:53)   Lipid Panel (04/22/2024 10:53)                       Would you like a call back once the refill request has been completed: [] Yes [] No    If the office needs to give you a call back, can they leave a voicemail: [] Yes [] No    Tonia Lockhart CMA  07/23/24, 16:45 EDT

## 2024-07-23 NOTE — TELEPHONE ENCOUNTER
Patient called she's in severe pain can't roll over and walk and bend.   I let her know that we will put an urgent mri in and if I can get it in soon to get that but if not soon enough then go to the ed if the pain is worse.

## 2024-07-24 ENCOUNTER — TELEPHONE (OUTPATIENT)
Dept: PHYSICAL THERAPY | Facility: CLINIC | Age: 64
End: 2024-07-24

## 2024-07-24 RX ORDER — GABAPENTIN 300 MG/1
300 CAPSULE ORAL 3 TIMES DAILY
Qty: 90 CAPSULE | Refills: 2 | Status: SHIPPED | OUTPATIENT
Start: 2024-07-24

## 2024-07-24 NOTE — TELEPHONE ENCOUNTER
Rx Refill Note  Requested Prescriptions     Pending Prescriptions Disp Refills    gabapentin (NEURONTIN) 300 MG capsule 90 capsule 2     Sig: Take 1 capsule by mouth 3 (Three) Times a Day.      Last office visit with prescribing clinician: 4/22/2024   Last telemedicine visit with prescribing clinician: Visit date not found   Next office visit with prescribing clinician: Visit date not found        Lipid Panel (04/22/2024 10:53)                  Would you like a call back once the refill request has been completed: [] Yes [] No    If the office needs to give you a call back, can they leave a voicemail: [] Yes [] No    Ashley Boone, RT  07/24/24, 08:54 EDT

## 2024-07-25 ENCOUNTER — TELEPHONE (OUTPATIENT)
Dept: NEUROSURGERY | Facility: CLINIC | Age: 64
End: 2024-07-25
Payer: COMMERCIAL

## 2024-07-25 ENCOUNTER — HOSPITAL ENCOUNTER (OUTPATIENT)
Dept: MRI IMAGING | Facility: HOSPITAL | Age: 64
Discharge: HOME | End: 2024-07-25
Admitting: NURSE PRACTITIONER
Payer: COMMERCIAL

## 2024-07-25 DIAGNOSIS — M51.36 DDD (DEGENERATIVE DISC DISEASE), LUMBAR: ICD-10-CM

## 2024-07-25 PROCEDURE — 72148 MRI LUMBAR SPINE W/O DYE: CPT

## 2024-07-25 NOTE — TELEPHONE ENCOUNTER
Called patient and scheduled an Appointment for her tomorrow 7/26/24 to see Mireille Alvarez. She Agreed

## 2024-07-25 NOTE — TELEPHONE ENCOUNTER
Called to ask patient what her symptoms are and if they are the same as before surgery?  is asking  HUB OKAY TO RELAY MESSAGE.

## 2024-07-25 NOTE — TELEPHONE ENCOUNTER
Pain is a burning pain that is on her left side of her back that is about the same as before just not going down into her legs and it does go around her hips.

## 2024-07-25 NOTE — TELEPHONE ENCOUNTER
Called patient to discuss possible injection and she stated that she had gotten an injection before surgery and it did absolutely nothing. She did also tell me that there is an area above and to the left of the incision that bulges out and hurts. She also stated that she continues to have left lower back and hip pain. She asked if someone could call her with the results of her MRI that was done today. I did tell her that I would forward this message to the provider and that someone will call her back.  She understood.

## 2024-07-25 NOTE — TELEPHONE ENCOUNTER
SHE SAID THAT ITS ON THE LEFT SIDE OF HER BACK AND THAT ITS BURNING IN HER BACK. SHE STATES THAT SHE HAS ALREADY SPOKEN TO US THIS WEEK AND ANSWERED ALL OF THESE QUESTIONS. PT WAS HOPING THAT HER IMAGING RESULTS WERE BACK AND WE WERE CALLING TO DISCUSS THOSE.

## 2024-07-26 ENCOUNTER — OFFICE VISIT (OUTPATIENT)
Dept: NEUROSURGERY | Facility: CLINIC | Age: 64
End: 2024-07-26
Payer: COMMERCIAL

## 2024-07-26 VITALS
WEIGHT: 136 LBS | BODY MASS INDEX: 25.03 KG/M2 | RESPIRATION RATE: 18 BRPM | HEART RATE: 82 BPM | DIASTOLIC BLOOD PRESSURE: 71 MMHG | SYSTOLIC BLOOD PRESSURE: 115 MMHG | HEIGHT: 62 IN | OXYGEN SATURATION: 100 %

## 2024-07-26 DIAGNOSIS — G89.18 POST-OP PAIN: Primary | ICD-10-CM

## 2024-07-26 DIAGNOSIS — M51.36 DDD (DEGENERATIVE DISC DISEASE), LUMBAR: ICD-10-CM

## 2024-07-26 NOTE — PROGRESS NOTES
Subjective   History of Present Illness: Ela Paulino is a 64 y.o. female is here today for wound check/postop follow-up/imaging review.  Patient underwent far lateral discectomy on the left at L4-L5 with Dr. Bonilla on 5/15/2024.  Postoperatively she is doing very well but developed some left-sided back pain back 1 July.  Dr. Bonilla did send in a Medrol Dosepak and patient's symptoms continued.  MRI was subsequently ordered by myself.  Postoperative changes noted with no acute findings.  Patient today reports actually bettering left-sided low back pain and left thigh pain than prior.  She has been experiencing waxing and waning of her symptoms since surgery.  She has significant resolution of her presurgical lower leg pain and paresthesias but the upper left thigh pain is fairly new postoperatively.  She also received significant benefit from the Medrol Dosepak.  She has been continuing with physical therapy and did start titrating down on her gabapentin therapy.  She is reporting no bowel/bladder dysfunction or saddle anesthesia.  History of Present Illness    The following portions of the patient's history were reviewed and updated as appropriate: allergies, current medications, past family history, past medical history, past social history, past surgical history, and problem list.    Review of Systems   Constitutional:  Positive for activity change.   HENT: Negative.     Eyes: Negative.    Respiratory: Negative.     Cardiovascular: Negative.    Gastrointestinal: Negative.    Endocrine: Negative.    Genitourinary: Negative.    Musculoskeletal:  Positive for arthralgias, back pain and myalgias.   Skin:         Swollen a little    Allergic/Immunologic: Negative.    Neurological:  Positive for numbness (+tingling from knee to ankle on left side).        Burning that is sometimes dull and ache   Trouble going from sitting to standing    Psychiatric/Behavioral:  Positive for sleep disturbance.    All other systems  "reviewed and are negative.      Objective     /71   Pulse 82   Resp 18   Ht 157.5 cm (62\")   Wt 61.7 kg (136 lb)   SpO2 100%   BMI 24.87 kg/m²    Body mass index is 24.87 kg/m².    Vitals:    07/26/24 1528   PainSc:   5   PainLoc: Back          Physical Exam  Neurologic Exam    Well-healed posterior lumbar incision with no fluid collection or drainage palpated  Assessment & Plan   Independent Review of Radiographic Studies:      I personally reviewed the images from the following studies.    MRI lumbar spine 7/25/2024    No overt signs of any recurrent disc herniation along previous surgical site on the left at L4-L5.  Similar findings elsewhere as compared to prior exam.  Postsurgical changes noted along left of midline lower lumbar spine with no significant fluid collection noted.    Medical Decision Making:      Ela Stewart a 64 y.o. female that underwent a minimally invasive left far lateral discectomy with Dr. Bonilla in May that is being seen today for wound check and review of imaging.  patient's imaging was reviewed with overt findings of recurrent disc herniation.  No other acute findings.  No significant fluid collection noted.  Dr. Bonilla was consulted and and does agree with imaging findings.  Patient's symptoms not overtly unexpected given her chronic MRI findings and presurgical pathology.  Patient has agreed to undergo targeted injection to help with her symptoms.  I also recommend patient continue with her physical therapy and return to her prior gabapentin dose until some of her symptoms do considerably calm down.  Patient does agree with plan of care and wishes to proceed.  I encouraged to call the office any questions or concerns.          Diagnoses and all orders for this visit:    1. Post-op pain (Primary)  -     Epidural Block    2. DDD (degenerative disc disease), lumbar  -     Epidural Block      Return for Next scheduled follow up.    This patient was examined wearing " appropriate personal protective equipment.     Ela Paulino  reports that she has never smoked. She has never been exposed to tobacco smoke. She has never used smokeless tobacco.   Body mass index is 24.87 kg/m².    BMI is within normal parameters. No other follow-up for BMI required.    Patient's blood pressure was reviewed.  Recommendations for  a low-salt diet and exercise to maintain/improve BP in addition to taking any presribed medications.                 Mireille Alvarez DNP, APRN    07/26/24  15:52 EDT

## 2024-07-31 ENCOUNTER — TREATMENT (OUTPATIENT)
Dept: PHYSICAL THERAPY | Facility: CLINIC | Age: 64
End: 2024-07-31
Payer: COMMERCIAL

## 2024-07-31 DIAGNOSIS — M54.16 ACUTE LUMBAR RADICULOPATHY: ICD-10-CM

## 2024-07-31 DIAGNOSIS — M51.36 DDD (DEGENERATIVE DISC DISEASE), LUMBAR: Primary | ICD-10-CM

## 2024-07-31 NOTE — PROGRESS NOTES
Physical Therapy Daily Treatment Note/ Progress Note  [address] [address]    Patient: Ela Paulino  : 1960  Referring practitioner: DEBRA Moise  Date of Initial Visit: Type: THERAPY  Noted: 2024  Today's Date: 2024  Patient seen for 16 sessions      Visit Diagnoses:    ICD-10-CM ICD-9-CM   1. DDD (degenerative disc disease), lumbar  M51.36 722.52   2. Acute lumbar radiculopathy  M54.16 724.4       VISIT#: 16    Subjective   Ela Paulino reports that she has had a set back in the past few weeks.  She states that there were days last week that she could barely walk.  She ended up getting an MRI and followed up with her surgeon for further evaluation.   She was instructed to continue her post-op medications and to understand that her recovery will take a while.  She states that she was also given a steroid and that has helped calm things down.  She states that her leg still tingles but it is not as numb.  Today her pain is down to 4/10 but states that the pain felt like 20/10 last week.  She has continued to work but it has been a challenge.    Pain Rating (0-10): 4/10 (previously 10+/10)    Modified Oswestry Back Index: 22/50 points (previously 21/50 points)    Objective     See Exercise, Manual, and Modality Logs for complete treatment.     Patient Education: Discussed treatment plan and continued focus on core stabilization and gentle flexibility    Assessment & Plan       Assessment  Impairments: abnormal gait, abnormal muscle tone, abnormal or restricted ROM, activity intolerance, impaired physical strength, lacks appropriate home exercise program, pain with function and weight-bearing intolerance   Functional limitations: carrying objects, lifting, sleeping, walking, uncomfortable because of pain, moving in bed, sitting, standing, reaching overhead and unable to perform repetitive tasks   Assessment details: The patient is a 64 y.o. female who  presented to physical therapy for treatment following her L4/5 lateral microdiscectomy on 5/15/24.  She is currently 11 weeks post-op.  Since her last reassessment, she has only been able to attend 5 visits in physical therapy.  She returned to work and then recently had a significant worsening of her symptoms.  She presents today with continued guarding and moderate pain.  She is limited with overall ROM and flexibility. She responds well to manual therapy to address muscle tone and soft tissue restrictions.  Her tolerance to exercise was limited today to gentle ROM/stretching and pelvic bracing with core stabilization activities.    Upon today's reassessment, the patient demonstrates the continued impairments: lower back and leg pain, LE weakness, difficulty with position changes, decreased tolerance to prolonged sitting and standing, decreased tolerance to weightbearing. Due to these impairments, the patient is still not sleeping well and has difficulty managing her household chores (cleaning, groceries, etc).  She has returned to work but has a primarily seated job but with multiple rises and returns to her chair. The patient would benefit from skilled PT services to address functional limitations and impairments and to improve patient quality of life.    Prognosis: good    Goals  Plan Goals: STG's: 3 weeks  Patient will report a decrease in pain by 25% - MET  Patient will be able to pick a light (<3#) object off the floor without an increase in pain- PARTIALLY MET  Patient will be able to perform HEP with minimal verbal cues- MET    LTG's: By discharge  Patient will report a decrease in pain by 65%- PARTIALLY MET  Patient will report an elimination of radicular symptoms- PARTIALLY MET  Patient will demonstrate an improvement in overall function as measured by an improvement in the Oswestry Disability Index score by >/= 7 points - NOT MET,  22/50 points (previously 21/50 points)  Patient will be able to sleep >  5 hours without waking from pain- PARTIALLY MET  Patient will be able to tolerate standing for > 25 minutes without increased pain- PARTIALLY MET  Patient will be able to tolerate sitting for > 60 minutes without increased pain- PARTIALLY MET  Patient will be able to ambulate community distances without increased pain- PARTIALLY MET  Patient will be independent with final HEP - PARTIALLY MET    Plan  Therapy options: will be seen for skilled therapy services  Planned modality interventions: cryotherapy, electrical stimulation/Russian stimulation, TENS and thermotherapy (hydrocollator packs)  Planned therapy interventions: abdominal trunk stabilization, balance/weight-bearing training, body mechanics training, flexibility, functional ROM exercises, gait training, home exercise program, manual therapy, neuromuscular re-education, postural training, soft tissue mobilization, spinal/joint mobilization, strengthening, stretching, therapeutic activities and joint mobilization  Frequency: 2x week  Duration in weeks: 4  Treatment plan discussed with: patient        Progress per Plan of Care and Progress strengthening /stabilization /functional activity          Timed:         Manual Therapy:    23     mins  97639;     Therapeutic Exercise:    15     mins  58035;     Neuromuscular Vandana:        mins  80091;    Therapeutic Activity:          mins  93594;     Gait Training:           mins  51629;     Ultrasound:          mins  47318;    Ionto                                   mins   33714  Self Care                            mins   84988    Un-Timed:  Electrical Stimulation:         mins  32433 ( );  Traction          mins 06435  Re-Eval                               mins  97984    No Charge:   Cryopack                         mins  Moist Heat                       mins    Timed Treatment:   38   mins   Total Treatment:     38   mins        Reshma Odell PT  Physical Therapist  [place] License: [id number]

## 2024-08-07 ENCOUNTER — TREATMENT (OUTPATIENT)
Dept: PHYSICAL THERAPY | Facility: CLINIC | Age: 64
End: 2024-08-07
Payer: COMMERCIAL

## 2024-08-07 DIAGNOSIS — M54.16 ACUTE LUMBAR RADICULOPATHY: ICD-10-CM

## 2024-08-07 DIAGNOSIS — M51.36 DDD (DEGENERATIVE DISC DISEASE), LUMBAR: Primary | ICD-10-CM

## 2024-08-07 NOTE — PROGRESS NOTES
Physical Therapy Daily Treatment Note  [address] [place] [address]    Patient: Ela Paulino  : 1960  Referring practitioner: DEBRA Moise  Date of Initial Visit: Type: THERAPY  Noted: 2024  Today's Date: 2024  Patient seen for 17 sessions      Visit Diagnoses:    ICD-10-CM ICD-9-CM   1. DDD (degenerative disc disease), lumbar  M51.36 722.52   2. Acute lumbar radiculopathy  M54.16 724.4       VISIT#: 17    Subjective   Ela Paulino reports that the steroids that she had to take last week have worn off.   Work has been crazy.  She states that she had to stand for about 3 hours today and her back is really achy and tender.  Pain Rating (0-10): 4-5/10    Objective     See Exercise, Manual, and Modality Logs for complete treatment.     Patient Education: continue gentle stretching and core stabilization    Assessment & Plan       Assessment  Assessment details: Patient continues with moderate lower back pain.  She presented with increased muscle tension throughout lumbar paraspinals and quadratus lumborum.  Treatment focused on manual therapy to address muscle tension and fascial restrictions.  She has difficulty tolerating exercise progression.  She is still limited in her standing and sitting tolerance as she has returned to work.         Progress per Plan of Care and Progress strengthening /stabilization /functional activity          Timed:         Manual Therapy:    23     mins  67298;     Therapeutic Exercise:    15     mins  90136;     Neuromuscular Vandana:        mins  31270;    Therapeutic Activity:          mins  76122;     Gait Training:           mins  93289;     Ultrasound:          mins  86935;    Ionto                                   mins   33380  Self Care                            mins   21236    Un-Timed:  Electrical Stimulation:         mins  03239 ( );  Traction          mins 38224  Re-Eval                               mins   72125    No Charge:   Cryopack                         mins  Moist Heat                       mins    Timed Treatment:   38   mins   Total Treatment:     38   mins        Reshma Odell PT  Physical Therapist  [place] License: [id number]

## 2024-08-12 ENCOUNTER — HOSPITAL ENCOUNTER (OUTPATIENT)
Dept: PAIN MEDICINE | Facility: HOSPITAL | Age: 64
Discharge: HOME | End: 2024-08-12
Payer: COMMERCIAL

## 2024-08-12 VITALS
SYSTOLIC BLOOD PRESSURE: 101 MMHG | DIASTOLIC BLOOD PRESSURE: 69 MMHG | HEART RATE: 85 BPM | TEMPERATURE: 97.1 F | HEIGHT: 62 IN | WEIGHT: 136 LBS | OXYGEN SATURATION: 96 % | BODY MASS INDEX: 25.03 KG/M2 | RESPIRATION RATE: 16 BRPM

## 2024-08-12 DIAGNOSIS — R52 PAIN: ICD-10-CM

## 2024-08-12 DIAGNOSIS — G89.18 POST-OP PAIN: Primary | ICD-10-CM

## 2024-08-12 DIAGNOSIS — M51.36 DDD (DEGENERATIVE DISC DISEASE), LUMBAR: ICD-10-CM

## 2024-08-12 PROCEDURE — 25010000002 BUPIVACAINE (PF) 0.25 % SOLUTION: Performed by: STUDENT IN AN ORGANIZED HEALTH CARE EDUCATION/TRAINING PROGRAM

## 2024-08-12 PROCEDURE — 77003 FLUOROGUIDE FOR SPINE INJECT: CPT

## 2024-08-12 PROCEDURE — 25010000002 METHYLPREDNISOLONE PER 40 MG: Performed by: STUDENT IN AN ORGANIZED HEALTH CARE EDUCATION/TRAINING PROGRAM

## 2024-08-12 PROCEDURE — 62323 NJX INTERLAMINAR LMBR/SAC: CPT | Performed by: STUDENT IN AN ORGANIZED HEALTH CARE EDUCATION/TRAINING PROGRAM

## 2024-08-12 PROCEDURE — 25510000001 IOPAMIDOL 41 % SOLUTION: Performed by: STUDENT IN AN ORGANIZED HEALTH CARE EDUCATION/TRAINING PROGRAM

## 2024-08-12 RX ORDER — BUPIVACAINE HYDROCHLORIDE 2.5 MG/ML
10 INJECTION, SOLUTION EPIDURAL; INFILTRATION; INTRACAUDAL ONCE
Status: COMPLETED | OUTPATIENT
Start: 2024-08-12 | End: 2024-08-12

## 2024-08-12 RX ORDER — METHYLPREDNISOLONE ACETATE 40 MG/ML
40 INJECTION, SUSPENSION INTRA-ARTICULAR; INTRALESIONAL; INTRAMUSCULAR; SOFT TISSUE ONCE
Status: COMPLETED | OUTPATIENT
Start: 2024-08-12 | End: 2024-08-12

## 2024-08-12 RX ORDER — IOPAMIDOL 408 MG/ML
3 INJECTION, SOLUTION INTRATHECAL
Status: COMPLETED | OUTPATIENT
Start: 2024-08-12 | End: 2024-08-12

## 2024-08-12 RX ADMIN — IOPAMIDOL 3 ML: 408 INJECTION, SOLUTION INTRATHECAL at 14:15

## 2024-08-12 RX ADMIN — BUPIVACAINE HYDROCHLORIDE 10 ML: 2.5 INJECTION, SOLUTION EPIDURAL; INFILTRATION; INTRACAUDAL; PERINEURAL at 14:15

## 2024-08-12 RX ADMIN — METHYLPREDNISOLONE ACETATE 40 MG: 40 INJECTION, SUSPENSION INTRA-ARTICULAR; INTRALESIONAL; INTRAMUSCULAR; INTRASYNOVIAL; SOFT TISSUE at 14:15

## 2024-08-12 NOTE — PROCEDURES
Lumbar Epidural Steroid Injection  [facility]    PREOPERATIVE DIAGNOSIS:   Chronic low back pain, Lumbar Disc Displacement, Lumbar Spondylosis without myelopathy, and Lumbar Radiculopathy  POSTOPERATIVE DIAGNOSIS:  Same as preop diagnosis    PROCEDURE:   Lumbar Epidural Steroid Injection, Therapeutic Translaminar Injection, with epidurogram, at  L3/L4 level    PRE-PROCEDURE DISCUSSION WITH PATIENT:    Risks and complications were discussed with the patient prior to starting the procedure and informed consent was obtained.  We discussed various topics including but not limited to bleeding, infection, injury, paralysis, nerve injury, dural puncture, coma, death, worsening of clinical picture, lack of pain relief, and postprocedural soreness.    SURGEON:  Bhanu Lyles MD    REASON FOR PROCEDURE:    Diagnostic injection at this level is needed, Degenerative changes are noted in the area., Radiating pattern of pain is likely consistent with degenerative changes in the area., and Radicular pain pattern seems consistent with this dermatome.    SEDATION:  Patient declined administration of moderate sedation    ANESTHETIC:  Marcaine 0.25%  STEROID:   Methylprednisolone (DEPO MEDROL) 40mg/ml    DESCRIPTON OF PROCEDURE:    After obtaining informed consent, I.V. was not started in the preop area.   The patient was taken to the operating room and placed in the prone position.  All pressure points were well padded.  The lumbar spine area was prepped with Chloraprep and draped in a sterile fashion.  Under fluoroscopic guidance, the above mentioned interlaminar space was identified. Skin and subcutaneous tissues were anesthetized with 1% lidocaine in the middle of the space. A Tuohy needle was introduced through the skin and advanced to this interlaminar space and into the epidural space under fluoroscopic guidance and verified with loss-of-resistance technique to air.  After confirming the position of the needle with  the fluoroscope with all the views, and after aspiration was confirmed negative for blood and CSF, 1.5 mL of Omnipaque was injected.  After seeing appropriate epidurogram with lateral and PA views, a total of 4 cc solution was injected, consisting of 3cc of local anesthetic as above, with normal saline and injectable steroid as above.     ESTIMATED BLOOD LOSS:  <5 mL  SPECIMENS:  None    COMPLICATIONS:     No complications were noted., There was no indication of vascular uptake on live injection of contrast dye., There was no indication of intrathecal uptake on live injection of contrast dye., and There was not any evidence of dural puncture.      TOLERANCE & DISCHARGE CONDITION:    The patient tolerated the procedure well.  The patient was transported to the recovery area without difficulties.  The patient was discharged to home under the care of family in stable and satisfactory condition.    PLAN OF CARE:  The patient was given our standard instruction sheet.  The patient will Return to clinic PRN  The patient will resume all medications as per the medication reconciliation sheet.

## 2024-08-12 NOTE — DISCHARGE INSTRUCTIONS

## 2024-08-13 ENCOUNTER — TELEPHONE (OUTPATIENT)
Dept: PAIN MEDICINE | Facility: HOSPITAL | Age: 64
End: 2024-08-13
Payer: COMMERCIAL

## 2024-08-14 ENCOUNTER — TREATMENT (OUTPATIENT)
Dept: PHYSICAL THERAPY | Facility: CLINIC | Age: 64
End: 2024-08-14
Payer: COMMERCIAL

## 2024-08-14 DIAGNOSIS — M54.16 ACUTE LUMBAR RADICULOPATHY: ICD-10-CM

## 2024-08-14 DIAGNOSIS — M51.36 DDD (DEGENERATIVE DISC DISEASE), LUMBAR: Primary | ICD-10-CM

## 2024-08-14 PROCEDURE — 97140 MANUAL THERAPY 1/> REGIONS: CPT | Performed by: PHYSICAL THERAPIST

## 2024-08-14 PROCEDURE — 97110 THERAPEUTIC EXERCISES: CPT | Performed by: PHYSICAL THERAPIST

## 2024-08-20 ENCOUNTER — TREATMENT (OUTPATIENT)
Dept: PHYSICAL THERAPY | Facility: CLINIC | Age: 64
End: 2024-08-20
Payer: COMMERCIAL

## 2024-08-20 DIAGNOSIS — M54.16 ACUTE LUMBAR RADICULOPATHY: ICD-10-CM

## 2024-08-20 DIAGNOSIS — M51.36 DDD (DEGENERATIVE DISC DISEASE), LUMBAR: Primary | ICD-10-CM

## 2024-08-20 PROCEDURE — 97110 THERAPEUTIC EXERCISES: CPT | Performed by: PHYSICAL THERAPIST

## 2024-08-20 PROCEDURE — 97112 NEUROMUSCULAR REEDUCATION: CPT | Performed by: PHYSICAL THERAPIST

## 2024-08-20 PROCEDURE — 97140 MANUAL THERAPY 1/> REGIONS: CPT | Performed by: PHYSICAL THERAPIST

## 2024-08-20 NOTE — PROGRESS NOTES
Physical Therapy Daily Treatment Note  [address] [address]    Patient: Ela Paulino  : 1960  Referring practitioner: DEBRA Moise  Date of Initial Visit: Type: THERAPY  Noted: 2024  Today's Date: 2024  Patient seen for 19 sessions      Visit Diagnoses:    ICD-10-CM ICD-9-CM   1. DDD (degenerative disc disease), lumbar  M51.36 722.52   2. Acute lumbar radiculopathy  M54.16 724.4       VISIT#: 19    Subjective   Ela Paulino reports that she has a weird feeling in her left distal thigh.  She states that she is also having muscle spasms in her legs at night.  Pain Rating (0-10): 4 more discomfort than pain    Objective     See Exercise, Manual, and Modality Logs for complete treatment.     Patient Education: Discussed healing process and extended time frame for healing given the extent of her compression    Assessment & Plan       Assessment  Assessment details: Patient continues with moderate lower back tenderness and diminished sensation in L thigh.  She was able to tolerate further progression of core stabilization and flexibility exercises today without increased symptoms.        Progress per Plan of Care and Progress strengthening /stabilization /functional activity          Timed:         Manual Therapy:    15     mins  07829;     Therapeutic Exercise:    20     mins  50467;     Neuromuscular Vandana:    8    mins  21691;    Therapeutic Activity:          mins  58994;     Gait Training:           mins  70422;     Ultrasound:          mins  39463;    Ionto                                   mins   80919  Self Care                            mins   96430    Un-Timed:  Electrical Stimulation:         mins  86084 (MC );  Traction          mins 72861  Re-Eval                               mins  25399    No Charge:   Cryopack                         mins  Moist Heat                       mins    Timed Treatment:   43   mins   Total Treatment:     43    suzie Odell, PT  Physical Therapist  [place] License: [id number]

## 2024-08-27 ENCOUNTER — TREATMENT (OUTPATIENT)
Dept: PHYSICAL THERAPY | Facility: CLINIC | Age: 64
End: 2024-08-27
Payer: COMMERCIAL

## 2024-08-27 DIAGNOSIS — M54.16 ACUTE LUMBAR RADICULOPATHY: ICD-10-CM

## 2024-08-27 DIAGNOSIS — M51.36 DDD (DEGENERATIVE DISC DISEASE), LUMBAR: Primary | ICD-10-CM

## 2024-08-27 PROCEDURE — 97110 THERAPEUTIC EXERCISES: CPT | Performed by: PHYSICAL THERAPIST

## 2024-08-27 PROCEDURE — 97112 NEUROMUSCULAR REEDUCATION: CPT | Performed by: PHYSICAL THERAPIST

## 2024-08-27 PROCEDURE — 97140 MANUAL THERAPY 1/> REGIONS: CPT | Performed by: PHYSICAL THERAPIST

## 2024-08-27 NOTE — PROGRESS NOTES
Physical Therapy Daily Treatment Note  [address] [place] [address]    Patient: Ela Paulino  : 1960  Referring practitioner: DEBRA Moise  Date of Initial Visit: Type: THERAPY  Noted: 2024  Today's Date: 2024  Patient seen for 20 sessions      Visit Diagnoses:    ICD-10-CM ICD-9-CM   1. DDD (degenerative disc disease), lumbar  M51.36 722.52   2. Acute lumbar radiculopathy  M54.16 724.4       VISIT#: 20    Subjective   Ela Paulino reports that her leg just feels weird.  She states that from her knee to her ankle is no longer totally numb like before.  She states that the weirdness makes her feel uncertain about her walking.  She states that she doesn't trust her leg strength.  Pain Rating (0-10): 2-3/10    Objective     See Exercise, Manual, and Modality Logs for complete treatment.     Patient Education: continue current HEP    Assessment & Plan       Assessment  Assessment details: Patient presented with reports of improved L LE sensation.  She continues with generalized weakness and mild lower back pain.  She was able to perform small range independent straight leg raises on her L leg today.  She still fatigues quickly with any repeated use of her left leg.        Progress per Plan of Care and Progress strengthening /stabilization /functional activity          Timed:         Manual Therapy:    15     mins  11920;     Therapeutic Exercise:    15     mins  88150;     Neuromuscular Vandana:    8    mins  72707;    Therapeutic Activity:          mins  52268;     Gait Training:           mins  01768;     Ultrasound:          mins  16122;    Ionto                                   mins   04836  Self Care                            mins   84828    Un-Timed:  Electrical Stimulation:         mins  10264 ( );  Traction          mins 04035  Re-Eval                               mins  26846    No Charge:   Cryopack                         mins  Moist Heat                        mins    Timed Treatment:   38   mins   Total Treatment:     38   mins        Reshma Odell PT  Physical Therapist  [place] License: [id number]

## 2024-08-29 NOTE — PROGRESS NOTES
"Subjective   History of Present Illness: Ela Paulino is a 64 y.o. female is here today for 3 month surgical follow-up. Today patient reports low back pain. She has numbness and tingling in her left leg. She has continued to improve with time.  Overall she is significantly better than she was prior to surgery.  She had a flareup of some back and leg pain that has improved.  She continues with physical therapy.  Overall she is pleased with the outcome of her surgery.    Chief Complaint   Patient presents with    Back Pain          Previous treatment: PO PT, Gabapentin    Previous neurosurgery:  5/15/2024- L4-5 far lateral left discectomy    Previous injections:     The following portions of the patient's history were reviewed and updated as appropriate: allergies, current medications, past family history, past medical history, past social history, past surgical history, and problem list.    Review of Systems   Constitutional:  Positive for activity change.   Respiratory:  Negative for chest tightness and shortness of breath.    Cardiovascular:  Negative for chest pain.   Musculoskeletal:  Positive for back pain and myalgias.   Neurological:  Positive for numbness.        + tingling       Objective      /86   Pulse 87   Resp 18   Ht 157.5 cm (62\")   Wt 59 kg (130 lb)   BMI 23.78 kg/m²    Body mass index is 23.78 kg/m².  There were no vitals filed for this visit.      Neurologic Exam      Assessment & Plan   Independent Review of Radiographic Studies:      I personally reviewed and interpreted the images from the following studies.    MRI lumbar spine: Far lateral disc herniation at L4-5 on the left significantly improved compared to preop.  No overt nerve compression    Medical Decision Making:      Ela Paulino is a 64 y.o. female doing well now 3 months postop from left L4-5 far lateral discectomy.  Patient can ramp up to normal activity.  I will see her back as needed      Diagnoses and all orders " for this visit:    1. Status post lumbar microdiscectomy (Primary)      No follow-ups on file.    This patient was examined wearing appropriate personal protective equipment.                      Dr. Colin Bonilla IV    09/04/24  15:21 EDT

## 2024-09-04 ENCOUNTER — OFFICE VISIT (OUTPATIENT)
Dept: NEUROSURGERY | Facility: CLINIC | Age: 64
End: 2024-09-04
Payer: COMMERCIAL

## 2024-09-04 VITALS
RESPIRATION RATE: 18 BRPM | SYSTOLIC BLOOD PRESSURE: 127 MMHG | DIASTOLIC BLOOD PRESSURE: 86 MMHG | WEIGHT: 130 LBS | HEIGHT: 62 IN | BODY MASS INDEX: 23.92 KG/M2 | HEART RATE: 87 BPM

## 2024-09-04 DIAGNOSIS — Z98.890 STATUS POST LUMBAR MICRODISCECTOMY: Primary | ICD-10-CM

## 2024-09-04 PROCEDURE — 99024 POSTOP FOLLOW-UP VISIT: CPT | Performed by: NEUROLOGICAL SURGERY

## 2024-09-05 ENCOUNTER — TREATMENT (OUTPATIENT)
Dept: PHYSICAL THERAPY | Facility: CLINIC | Age: 64
End: 2024-09-05
Payer: COMMERCIAL

## 2024-09-05 DIAGNOSIS — M54.16 ACUTE LUMBAR RADICULOPATHY: ICD-10-CM

## 2024-09-05 DIAGNOSIS — M51.36 DDD (DEGENERATIVE DISC DISEASE), LUMBAR: Primary | ICD-10-CM

## 2024-09-05 PROCEDURE — 97110 THERAPEUTIC EXERCISES: CPT | Performed by: PHYSICAL THERAPIST

## 2024-09-05 PROCEDURE — 97140 MANUAL THERAPY 1/> REGIONS: CPT | Performed by: PHYSICAL THERAPIST

## 2024-09-05 NOTE — PROGRESS NOTES
Re-Assessment / Re-Certification  [place]    [address] [place] [address]        Patient: Ela Paulino   : 1960  Diagnosis/ICD-10 Code:  DDD (degenerative disc disease), lumbar [M51.36]  Referring practitioner: DEBRA Moise  Date of Initial Visit: Type: THERAPY  Noted: 2024  Today's Date: 24  Patient seen for 21 sessions      Visit Diagnoses:      ICD-10-CM ICD-9-CM   1. DDD (degenerative disc disease), lumbar  M51.36 722.52   2. Acute lumbar radiculopathy  M54.16 724.4       Subjective:     Ela Paulino reports that she saw her surgeon yesterday.  She states that she still has some intermittent weakness in her L thigh and some numbness in her L leg.  She feels like physical therapy has been really helpful but she does continue with moderate back pain.  She has been able to return to work but does report increased back pain after a full shift at work.  Pain Scale (0-10): /10    Subjective Questionnaire: Oswestry: 22/50 points (previously 21/50 points)     Clinical Progress: improved  Home Program Compliance: Yes  Treatment has included: therapeutic exercise, neuromuscular re-education, manual therapy, therapeutic activity, electrical stimulation, and moist heat    Objective        Special Questions  Patient is experiencing disturbed sleep.       Static Posture   General Observations  Guarded.     Palpation   Left   Hypertonic in the lumbar paraspinals.   Tenderness of the lumbar paraspinals, piriformis and quadratus lumborum.     Tenderness     Left Hip   Tenderness in the PSIS.     Neurological Testing     Sensation     Lumbar   Left   Diminished: light touch    Passive Range of Motion   Left Hip   Flexion: 100 degrees   Extension: 0 degrees   External rotation (90/90): 30 degrees   Internal rotation (90/90): 10 degrees     Strength/Myotome Testing     Left Hip   Planes of Motion   Flexion: 4-  Extension: 4-  Abduction: 4  Adduction: 4+    Right Hip   Planes of  Motion   Flexion: 5  Extension: 4  Abduction: 4+  Adduction: 4+    Left Knee   Flexion: 4  Extension: 4    Right Knee   Flexion: 5  Extension: 5    Left Ankle/Foot   Dorsiflexion: 4  Plantar flexion: 4-    Right Ankle/Foot   Dorsiflexion: 5  Plantar flexion: 4    Ambulation   Weight-Bearing Status   Assistive device used: none    Observational Gait   Decreased walking speed.       Assessment & Plan       Assessment  Impairments: abnormal gait, abnormal muscle tone, abnormal or restricted ROM, activity intolerance, impaired physical strength, lacks appropriate home exercise program, pain with function and weight-bearing intolerance   Functional limitations: carrying objects, lifting, sleeping, walking, uncomfortable because of pain, moving in bed, sitting, standing, reaching overhead and unable to perform repetitive tasks   Assessment details: The patient is a 64 y.o. female who presented to physical therapy for treatment following her L4/5 lateral microdiscectomy on 5/15/24.  She is currently 16 weeks post-op.  Since her last reassessment, she has continued to work full duties.  She had an initial flare of her pain and numbness with that return to work but it has since improved. She continues to be limited in overall spinal and LE ROM/flexibility.  Her strength is improving in her left leg but continues to have weakness and numbness in her left leg. She continues to respond well to manual therapy to address muscle tone and soft tissue restrictions.  Her tolerance to exercise was limited but has improved.    Upon today's reassessment, the patient demonstrates the continued impairments: lower back and leg pain, LLE weakness, decreased tolerance to prolonged sitting and standing, decreased tolerance to weightbearing. Due to these impairments, the patient is still not sleeping well and has difficulty managing her household chores (cleaning, groceries, etc).  She has returned to work but has a primarily seated job but  with multiple rises and returns to her chair. The patient could benefit from skilled PT services to address functional limitations and impairments and to improve patient quality of life.    Prognosis: good    Goals  Plan Goals: STG's: 3 weeks  Patient will report a decrease in pain by 25% - MET  Patient will be able to pick a light (<3#) object off the floor without an increase in pain- PARTIALLY MET  Patient will be able to perform HEP with minimal verbal cues- MET    LTG's: By discharge  Patient will report a decrease in pain by 65%- PARTIALLY MET  Patient will report an elimination of radicular symptoms- PARTIALLY MET  Patient will demonstrate an improvement in overall function as measured by an improvement in the Oswestry Disability Index score by >/= 7 points - NOT MET,  22/50 points (previously 21/50 points)  Patient will be able to sleep > 5 hours without waking from pain- PARTIALLY MET  Patient will be able to tolerate standing for > 25 minutes without increased pain- PARTIALLY MET  Patient will be able to tolerate sitting for > 60 minutes without increased pain- PARTIALLY MET  Patient will be able to ambulate community distances without increased pain- PARTIALLY MET  Patient will be independent with final HEP - PARTIALLY MET    Plan  Therapy options: will be seen for skilled therapy services  Planned modality interventions: cryotherapy, electrical stimulation/Russian stimulation, TENS and thermotherapy (hydrocollator packs)  Planned therapy interventions: abdominal trunk stabilization, balance/weight-bearing training, body mechanics training, flexibility, functional ROM exercises, gait training, home exercise program, manual therapy, neuromuscular re-education, postural training, soft tissue mobilization, spinal/joint mobilization, strengthening, stretching, therapeutic activities and joint mobilization  Frequency: 2x week  Duration in weeks: 12  Treatment plan discussed with: patient      Progress toward  previous goals: Partially Met       Recommendations: Continue as planned  Timeframe: 3 months  Prognosis to achieve goals: good      Timed:         Manual Therapy:    23     mins  54105;     Therapeutic Exercise:    15     mins  93775;     Neuromuscular Vandana:    5    mins  71006;    Therapeutic Activity:          mins  36561;     Gait Training:           mins  17774;     Ultrasound:          mins  31223;    Ionto                                   mins   45091  Self Care                            mins   22183    Un-Timed:  Electrical Stimulation:         mins  55045 (MC );  Traction          mins 44249  Re-Eval                               mins  75677    No Charge:   Cryopack                         mins  Moist Heat                       mins      Timed Treatment:   43   mins   Total Treatment:     43   mins        PT Signature: Reshma Odell, YUE  [place] License: [id number]      Certification Period: 9/5/2024 thru 12/3/2024  I certify that the therapy services are furnished while this patient is under my care.  The services outlined above are required by this patient, and will be reviewed every 90 days.    Based upon review of the patient's progress and continued therapy plan, it is my medical opinion that Ela Paulino should continue physical therapy treatment at [facility]  [address] [place] [address] [place] [address] [phone].      Signature: ____________________________  Physician:  Calvin Bridges PA  NPI: [id number]                                          Date: ________________________________

## 2024-09-11 ENCOUNTER — TREATMENT (OUTPATIENT)
Dept: PHYSICAL THERAPY | Facility: CLINIC | Age: 64
End: 2024-09-11
Payer: COMMERCIAL

## 2024-09-11 DIAGNOSIS — M51.36 DDD (DEGENERATIVE DISC DISEASE), LUMBAR: Primary | ICD-10-CM

## 2024-09-11 DIAGNOSIS — M54.16 ACUTE LUMBAR RADICULOPATHY: ICD-10-CM

## 2024-09-11 PROCEDURE — 97140 MANUAL THERAPY 1/> REGIONS: CPT | Performed by: PHYSICAL THERAPIST

## 2024-09-11 PROCEDURE — 97110 THERAPEUTIC EXERCISES: CPT | Performed by: PHYSICAL THERAPIST

## 2024-09-11 NOTE — PROGRESS NOTES
Physical Therapy Daily Treatment Note  [address] [place] [address]    Patient: Ela Paulino  : 1960  Referring practitioner: DEBRA Moise  Date of Initial Visit: Type: THERAPY  Noted: 2024  Today's Date: 2024  Patient seen for 22 sessions      Visit Diagnoses:    ICD-10-CM ICD-9-CM   1. DDD (degenerative disc disease), lumbar  M51.36 722.52   2. Acute lumbar radiculopathy  M54.16 724.4       VISIT#: 22    Subjective   Ela Paulino reports that she has no pain.  She just feels weirdness and tingling from her knee to her ankle.  She states that she still feels numb in her L thigh.  She has been able to decrease her gabapentin and she has had a good week.  She states that her leg feels heavy but she has no pain.  Pain Rating (0-10): 0    Objective     See Exercise, Manual, and Modality Logs for complete treatment.     Patient Education: Continue current HEP    Assessment & Plan       Assessment  Assessment details: Patient continues with intermittent lower back and leg pain but improving reports of peripheral symptoms.  She continues with residual L thigh numbness but has been demonstrating improved LE strength.  She is able to complete a SLR without assistance and has been able to progress strengthening activities.           Progress per Plan of Care and Progress strengthening /stabilization /functional activity          Timed:         Manual Therapy:    23     mins  38985;     Therapeutic Exercise:    20     mins  61020;     Neuromuscular Vandana:        mins  41373;    Therapeutic Activity:          mins  60683;     Gait Training:           mins  30224;     Ultrasound:          mins  98828;    Ionto                                   mins   93235  Self Care                            mins   49841    Un-Timed:  Electrical Stimulation:         mins  76802 ( );  Traction          mins 62935  Re-Eval                               mins  34279    No Charge:    Cryopack                         mins  Moist Heat                       mins    Timed Treatment:    43   mins   Total Treatment:     43   mins        Reshma Odell PT  Physical Therapist  [place] License: [id number]

## 2024-09-18 ENCOUNTER — TREATMENT (OUTPATIENT)
Dept: PHYSICAL THERAPY | Facility: CLINIC | Age: 64
End: 2024-09-18
Payer: COMMERCIAL

## 2024-09-18 DIAGNOSIS — M51.36 DDD (DEGENERATIVE DISC DISEASE), LUMBAR: Primary | ICD-10-CM

## 2024-09-18 DIAGNOSIS — M54.16 ACUTE LUMBAR RADICULOPATHY: ICD-10-CM

## 2024-09-18 PROCEDURE — 97110 THERAPEUTIC EXERCISES: CPT | Performed by: PHYSICAL THERAPIST

## 2024-09-18 PROCEDURE — 97140 MANUAL THERAPY 1/> REGIONS: CPT | Performed by: PHYSICAL THERAPIST

## 2024-09-25 ENCOUNTER — TREATMENT (OUTPATIENT)
Dept: PHYSICAL THERAPY | Facility: CLINIC | Age: 64
End: 2024-09-25
Payer: COMMERCIAL

## 2024-09-25 DIAGNOSIS — M51.369 DDD (DEGENERATIVE DISC DISEASE), LUMBAR: Primary | ICD-10-CM

## 2024-09-25 DIAGNOSIS — M54.16 ACUTE LUMBAR RADICULOPATHY: ICD-10-CM

## 2024-09-25 PROCEDURE — 97140 MANUAL THERAPY 1/> REGIONS: CPT | Performed by: PHYSICAL THERAPIST

## 2024-09-25 PROCEDURE — 97110 THERAPEUTIC EXERCISES: CPT | Performed by: PHYSICAL THERAPIST

## 2024-10-17 RX ORDER — GABAPENTIN 300 MG/1
300 CAPSULE ORAL 3 TIMES DAILY
Qty: 90 CAPSULE | Refills: 0 | Status: SHIPPED | OUTPATIENT
Start: 2024-10-17

## 2024-10-17 NOTE — TELEPHONE ENCOUNTER
Rx Refill Note  Requested Prescriptions     Pending Prescriptions Disp Refills    gabapentin (NEURONTIN) 300 MG capsule 90 capsule 2     Sig: Take 1 capsule by mouth 3 (Three) Times a Day.      Last office visit with prescribing clinician: 4/22/2024   Last telemedicine visit with prescribing clinician: Visit date not found   Next office visit with prescribing clinician: Visit date not found        Lipid Panel (04/22/2024 10:53)                  Would you like a call back once the refill request has been completed: [] Yes [] No    If the office needs to give you a call back, can they leave a voicemail: [] Yes [] No    Ashley Boone, RT  10/17/24, 09:12 EDT

## 2024-11-11 DIAGNOSIS — R25.2 CRAMPS, MUSCLE, GENERAL: Primary | ICD-10-CM

## 2024-11-11 DIAGNOSIS — R73.9 HYPERGLYCEMIA: ICD-10-CM

## 2024-11-12 RX ORDER — TIZANIDINE 2 MG/1
2 TABLET ORAL EVERY 8 HOURS PRN
Qty: 21 TABLET | Refills: 0 | Status: SHIPPED | OUTPATIENT
Start: 2024-11-12

## 2024-11-13 ENCOUNTER — LAB (OUTPATIENT)
Dept: LAB | Facility: HOSPITAL | Age: 64
End: 2024-11-13
Payer: COMMERCIAL

## 2024-11-13 DIAGNOSIS — R73.9 HYPERGLYCEMIA: ICD-10-CM

## 2024-11-13 DIAGNOSIS — R25.2 CRAMPS, MUSCLE, GENERAL: ICD-10-CM

## 2024-11-13 LAB
25(OH)D3 SERPL-MCNC: 32.4 NG/ML (ref 30–100)
ALBUMIN SERPL-MCNC: 4.3 G/DL (ref 3.5–5.2)
ALBUMIN/GLOB SERPL: 1.6 G/DL
ALP SERPL-CCNC: 78 U/L (ref 39–117)
ALT SERPL W P-5'-P-CCNC: 20 U/L (ref 1–33)
ANION GAP SERPL CALCULATED.3IONS-SCNC: 9 MMOL/L (ref 5–15)
AST SERPL-CCNC: 17 U/L (ref 1–32)
BILIRUB SERPL-MCNC: 0.4 MG/DL (ref 0–1.2)
BUN SERPL-MCNC: 13 MG/DL (ref 8–23)
BUN/CREAT SERPL: 16.9 (ref 7–25)
CALCIUM SPEC-SCNC: 9.7 MG/DL (ref 8.6–10.5)
CHLORIDE SERPL-SCNC: 106 MMOL/L (ref 98–107)
CO2 SERPL-SCNC: 27 MMOL/L (ref 22–29)
CREAT SERPL-MCNC: 0.77 MG/DL (ref 0.57–1)
EGFRCR SERPLBLD CKD-EPI 2021: 86.8 ML/MIN/1.73
GLOBULIN UR ELPH-MCNC: 2.7 GM/DL
GLUCOSE SERPL-MCNC: 90 MG/DL (ref 65–99)
HBA1C MFR BLD: 5.8 % (ref 4.8–5.6)
POTASSIUM SERPL-SCNC: 4 MMOL/L (ref 3.5–5.2)
PROT SERPL-MCNC: 7 G/DL (ref 6–8.5)
SODIUM SERPL-SCNC: 142 MMOL/L (ref 136–145)
VIT B12 BLD-MCNC: 1194 PG/ML (ref 211–946)

## 2024-11-13 PROCEDURE — 82306 VITAMIN D 25 HYDROXY: CPT

## 2024-11-13 PROCEDURE — 82607 VITAMIN B-12: CPT

## 2024-11-13 PROCEDURE — 80053 COMPREHEN METABOLIC PANEL: CPT

## 2024-11-13 PROCEDURE — 83036 HEMOGLOBIN GLYCOSYLATED A1C: CPT

## 2024-11-18 ENCOUNTER — DOCUMENTATION (OUTPATIENT)
Dept: PHYSICAL THERAPY | Facility: CLINIC | Age: 64
End: 2024-11-18
Payer: COMMERCIAL

## 2024-11-18 NOTE — PROGRESS NOTES
Physical Therapy Discharge Summary  [place]    [address] [place] [address]      Patient Information  Patient: Ela Paulino  : 1960    Dates of Physical Therapy visits: 24-24  Number of Visits: 24     Discharge Status of Patient: See MD Note dated 24    Goals: Partially Met    Discharge Plan: Continue with current home exercise program as instructed  Patient to return to referring/providing physician    Comments Patient did not return for ongoing care so their chart will be discharged at this time.      Date of Discharge: 2024        Reshma Odell PT  Physical Therapist  [place] License: [id number]

## 2024-11-27 ENCOUNTER — OFFICE VISIT (OUTPATIENT)
Dept: CARDIOLOGY | Facility: CLINIC | Age: 64
End: 2024-11-27
Payer: COMMERCIAL

## 2024-11-27 VITALS
WEIGHT: 124 LBS | HEART RATE: 96 BPM | BODY MASS INDEX: 22.68 KG/M2 | SYSTOLIC BLOOD PRESSURE: 104 MMHG | OXYGEN SATURATION: 97 % | DIASTOLIC BLOOD PRESSURE: 71 MMHG

## 2024-11-27 DIAGNOSIS — R73.03 PREDIABETES: ICD-10-CM

## 2024-11-27 DIAGNOSIS — R21 RASH: Primary | ICD-10-CM

## 2024-11-27 RX ORDER — BIMATOPROST 3 UG/ML
1 SOLUTION TOPICAL EVERY 24 HOURS
COMMUNITY
Start: 2024-11-06

## 2024-11-27 NOTE — PROGRESS NOTES
Cardiology Consult Note    Patient Identification:  Name: Ela Paulino  Age: 64 y.o.  Sex: female  :  1960  MRN: [id number]             Requesting Physician :  Kelely Parikh DO     Reason for Consultation / Chief Complaint :   Rash    History of Present Illness:      Ms. Ela Paulino has PMH of    History of palpitations   Pituitary mass  Allergies/intolerance to codeine  D and C, L4-L5 a lateral left discectomy 5/15/2024  History of anemia  Positive VEENA  History of miscarriages  Non-smoker    Here for evaluation of rash.  Patient says she has what looks like petechial rash on and off, currently she does not have it.  But she has cell phone pictures.  She has small areas of petechial rash on the hands and legs.  Patient denies any fever, chills, weight loss, loss of appetite.  Patient was seen in  for palpitations/sensation of vibration throughout her body of unclear significance.  Had echocardiogram which showed normal LV size and function.  Had a Holter monitor where she had palpitations but did not have any arrhythmias on the monitor.  Patient wanted to know results of her echo, because when the echo tech was doing the echo, reportedly she asked if she was a smoker.  She wanted to know if there was damage to her heart because of passive smoking from her father.  Patient had normal RV size on her echo from .     Patient's arterial blood pressure is 104/71, heart rate 96, O2 sat of 97% on room air.    Review of records:  EKG 5/3/2024 reviewed/interpreted by me reveals sinus rhythm with a rate of 79 bpm.    Echocardiogram 12/3/2021 reveals EF of 60 to 65%.     Holter monitor 2021 reveals heart rate 54-1 39, mean of 81, patient triggered symptoms not as stated with arrhythmias.    Labs from 10/23/2021 revealed normal CMP and CBC, TSH at 1, free T4 normal at 1.18.  Labs from 2024 reveal hemoglobin A1c of 5.8.  Normal CMP, B12           Assessment:  :     Rash  Prediabetes         Recommendations / Plan:         Reviewed EKG results with patient.  Spoke to the patient to see her primary care physician or dermatology or hematology for petechial rash.  If other workup is negative we will consider cardiac workup again.  Reviewed echo images with patient and showed her right heart size was normal and function was normal.  Patient was questioning about my line of questioning about her miscarriages and possible blood disorders like protein C/S.  Wanted to know if she had those.  Advised her to follow-up with PMD or hematology for the same.             Diagnosis Plan   1. Rash        2. Prediabetes                   Past Medical History:  Past Medical History:   Diagnosis Date    Abnormal ECG     Allergic     Codein    Anemia     As a kid    Arthritis     Cataract     Surgery    Cholelithiasis     Delayed emergence from anesthesia     Depression     DEXA     = (0.1/0.1); = (0.0/ 0.1)    Injury of back 25    Low back pain     MAMMO     NEG =     Meniere's Rt ear     Neck pain, acute     PAP     (ABHILASH Exposure) = NEG    Positive VEENA (antinuclear antibody)     Seizures     As a kid    Thoracic disc disorder 25     Past Surgical History:  Past Surgical History:   Procedure Laterality Date    CATARACT EXTRACTION, BILATERAL      COLONOSCOPY      = NEG, rech        GSI    DIAGNOSTIC LAPAROSCOPY      for poss Tubal Preg    DILATATION AND CURETTAGE      GANGLION CYST EXCISION Right     wrist    LASIK Bilateral     LUMBAR DISCECTOMY Left 05/15/2024    Procedure: Left lumbar 4 5 far lateral microdiscectomy;  Surgeon: Colin Bonilla IV, MD;  Location[facility] MAIN OR;  Service: Neurosurgery;  Laterality: Left;    VAGINAL DELIVERY       -  X3    WISDOM TOOTH EXTRACTION        Allergies:  Allergies   Allergen Reactions    Codeine Nausea Only     Home Meds:      Current Meds:     Current Outpatient Medications:     bimatoprost (Latisse) 0.03 % ophthalmic solution, Apply 1  drop topically to the appropriate area as directed Daily., Disp: , Rfl:     Diclofenac Sodium (VOLTAREN) 1 % gel gel, APPLY 0.5 GRAMS TO THE AFFECTED AREA(S) BY TOPICAL ROUTE 4 TIMES PER DAY, Disp: 100 g, Rfl: 2    gabapentin (NEURONTIN) 300 MG capsule, Take 1 capsule by mouth 3 (Three) Times a Day. (Patient taking differently: Take 1 capsule by mouth Daily.), Disp: 90 capsule, Rfl: 0    omeprazole (priLOSEC) 20 MG capsule, Take 1 tablet by oral route daily, Disp: 90 capsule, Rfl: 3    tiZANidine (ZANAFLEX) 2 MG tablet, Take 1 tablet by mouth Every 8 (Eight) Hours As Needed for Muscle Spasms., Disp: 21 tablet, Rfl: 0  Social History:   Social History     Tobacco Use    Smoking status: Never     Passive exposure: Never    Smokeless tobacco: Never   Substance Use Topics    Alcohol use: Not Currently      Family History:  Family History   Problem Relation Age of Onset    Hypertension Mother     Arthritis Mother     Heart disease Mother     COPD Father     Lung cancer Father     No Known Problems Sister     Testicular cancer Brother     Mental illness Son         Autism    Heart disease Maternal Grandmother     Stroke Paternal Grandmother     Other Other         FH OF RA- SEVERAL FAMILY MEMBERS        Review of Systems : Review of Systems   Constitutional: Negative for malaise/fatigue.   Cardiovascular:  Negative for chest pain, dyspnea on exertion, leg swelling and palpitations.   Respiratory:  Negative for cough and shortness of breath.    Gastrointestinal:  Negative for abdominal pain, nausea and vomiting.   Neurological:  Positive for dizziness. Negative for focal weakness, headaches, light-headedness and numbness.   All other systems reviewed and are negative.               Constitutional:  Heart Rate:  [96] 96  BP: (104)/(71) 104/71    Physical Exam   /71 (BP Location: Left arm, Patient Position: Sitting, Cuff Size: Adult)   Pulse 96   Wt 56.2 kg (124 lb)   SpO2 97%   BMI 22.68 kg/m²   Physical  Exam  General:  Appears in no acute distress  Eyes: Sclerae are anicteric,  conjunctivae are clear   HEENT:  No JVD. Thyroid not visibly enlarged. No mucosal pallor or cyanosis  Respiratory: Respirations regular and unlabored at rest.  Bilaterally good breath sounds with good air entry in all fields. No crackles, rubs or wheezes auscultated  Cardiovascular: S1,S2 Regular rate and rhythm. No murmur, rub or gallop auscultated. No pretibial pitting edema  Gastrointestinal: Abdomen soft, flat, nontender. Bowel sounds present.   Musculoskeletal:  No abnormal movements  Extremities: No digital clubbing or cyanosis  Skin: Color pink. Skin warm and dry to touch. No rashes  No xanthoma  Neuro: Alert and awake, no lateralizing deficits appreciated    Cardiographics  ECG: EKG tracing was  personally reviewed/interpreted by me from 5/3/2024 reveals sinus rhythm with rate of 79 bpm.  Echocardiogram:   Results for orders placed during the hospital encounter of 12/03/21    Adult Transthoracic Echo Complete W/ Cont if Necessary Per Protocol    Interpretation Summary  Normal LV size and contractility EF of 60 to 65%  Normal RV size  Normal atrial size  Aortic valve, mitral valve, tricuspid valve appears structurally normal, no significant regurgitation seen.  No pericardial effusion seen.  Proximal aorta appears normal in size.      Imaging  Chest X-ray:   Imaging Results (Last 24 Hours)       ** No results found for the last 24 hours. **            Lab Review: I have reviewed the labs                                      Feng Vásquez MD  11/27/2024, 10:20 EST      EMR Dragon/Transcription:   Dictated utilizing Dragon dictation

## 2024-12-12 ENCOUNTER — TELEPHONE (OUTPATIENT)
Dept: FAMILY MEDICINE CLINIC | Facility: CLINIC | Age: 64
End: 2024-12-12

## 2024-12-12 ENCOUNTER — OFFICE VISIT (OUTPATIENT)
Dept: FAMILY MEDICINE CLINIC | Facility: CLINIC | Age: 64
End: 2024-12-12
Payer: COMMERCIAL

## 2024-12-12 VITALS
OXYGEN SATURATION: 99 % | SYSTOLIC BLOOD PRESSURE: 112 MMHG | HEIGHT: 62 IN | BODY MASS INDEX: 22.82 KG/M2 | RESPIRATION RATE: 16 BRPM | DIASTOLIC BLOOD PRESSURE: 70 MMHG | HEART RATE: 93 BPM | WEIGHT: 124 LBS | TEMPERATURE: 98.2 F

## 2024-12-12 DIAGNOSIS — U07.1 COVID-19 VIRUS INFECTION: Primary | ICD-10-CM

## 2024-12-12 LAB
EXPIRATION DATE: ABNORMAL
FLUAV AG UPPER RESP QL IA.RAPID: NOT DETECTED
FLUBV AG UPPER RESP QL IA.RAPID: NOT DETECTED
INTERNAL CONTROL: ABNORMAL
Lab: ABNORMAL
SARS-COV-2 AG UPPER RESP QL IA.RAPID: DETECTED

## 2024-12-12 PROCEDURE — 99213 OFFICE O/P EST LOW 20 MIN: CPT | Performed by: FAMILY MEDICINE

## 2024-12-12 PROCEDURE — 87428 SARSCOV & INF VIR A&B AG IA: CPT | Performed by: FAMILY MEDICINE

## 2024-12-12 RX ORDER — GABAPENTIN 300 MG/1
300 CAPSULE ORAL DAILY
Start: 2024-12-12

## 2024-12-12 NOTE — TELEPHONE ENCOUNTER
Caller: Ela Paulino    Relationship: Self    Best call back number: [phone]    What medication are you requesting: ANTIBIOTICS     What are your current symptoms:     FEVER, SNEEZING, CONGESTION     How long have you been experiencing symptoms: 3 DAYS     Have you had these symptoms before:    [x] Yes  [] No    Have you been treated for these symptoms before:   [] Yes  [] No    If a prescription is needed, what is your preferred pharmacy and phone number: [facility] [place]     Additional notes:

## 2024-12-12 NOTE — PROGRESS NOTES
Subjective   Ela Paulino is a 64 y.o. female.     Chief Complaint   Patient presents with    Fever     Congestion,fever,sneezing,headache since Tuesday.         Current Outpatient Medications:     Diclofenac Sodium (VOLTAREN) 1 % gel gel, APPLY 0.5 GRAMS TO THE AFFECTED AREA(S) BY TOPICAL ROUTE 4 TIMES PER DAY, Disp: 100 g, Rfl: 2    gabapentin (NEURONTIN) 300 MG capsule, Take 1 capsule by mouth Daily., Disp: , Rfl:     omeprazole (priLOSEC) 20 MG capsule, Take 1 tablet by oral route daily, Disp: , Rfl:     polyethylene glycol (MiraLax) 17 GM/SCOOP powder, Take as directed per instructions for bowel prep, Disp: 238 g, Rfl: 0    sorbitol 70 % solution solution, Take 100 ml by mouth as directed for 1 day, Disp: 100 mL, Rfl: 0    tiZANidine (ZANAFLEX) 2 MG tablet, Take 1 tablet by mouth Every 8 (Eight) Hours As Needed for Muscle Spasms., Disp: 21 tablet, Rfl: 0    bimatoprost (Latisse) 0.03 % ophthalmic solution, Apply 1 drop topically to the appropriate area as directed Daily., Disp: , Rfl:     ketoconazole (NIZORAL) 2 % cream, Apply twice daily to rash at corners of mouth, Disp: 30 g, Rfl: 5    triamcinolone (KENALOG) 0.025 % ointment, Apply 2 times daily to lips as needed, Disp: 15 g, Rfl: 1    Past Medical History:   Diagnosis Date    Abnormal ECG     Allergic     Codein    Anemia     As a kid    Arthritis     Cataract     Surgery    Cholelithiasis     Delayed emergence from anesthesia     Depression     DEXA     2017= (0.1/0.1); 2023= (0.0/ 0.1)    Injury of back 2/29/24    Low back pain     MAMMO     NEG = 2024    Meniere's Rt ear     Neck pain, acute     PAP     (ABHILASH Exposure) 2023= NEG    Positive VEENA (antinuclear antibody)     Seizures     As a kid    Thoracic disc disorder 2/28/24       Past Surgical History:   Procedure Laterality Date    CATARACT EXTRACTION, BILATERAL      COLONOSCOPY      2014= NEG, rech 2024       GSI    DIAGNOSTIC LAPAROSCOPY      for poss Tubal Preg    DILATATION AND CURETTAGE       GANGLION CYST EXCISION Right     wrist    LASIK Bilateral     LUMBAR DISCECTOMY Left 05/15/2024    Procedure: Left lumbar 4 5 far lateral microdiscectomy;  Surgeon: Colin Bonilla IV, MD;  Location: [facility] MAIN OR;  Service: Neurosurgery;  Laterality: Left;    VAGINAL DELIVERY       -  X3    WISDOM TOOTH EXTRACTION         Family History   Problem Relation Age of Onset    Hypertension Mother     Arthritis Mother     Heart disease Mother     COPD Father     Lung cancer Father     No Known Problems Sister     Testicular cancer Brother     Mental illness Son         Autism    Heart disease Maternal Grandmother     Stroke Paternal Grandmother     Other Other         FH OF RA- SEVERAL FAMILY MEMBERS       Social History     Socioeconomic History    Marital status:    Tobacco Use    Smoking status: Never     Passive exposure: Never    Smokeless tobacco: Never   Vaping Use    Vaping status: Never Used   Substance and Sexual Activity    Alcohol use: Not Currently    Drug use: Never    Sexual activity: Yes     Partners: Male     Birth control/protection: Post-menopausal       History of Present Illness  The patient is a 64-year-old female who presents with complaints of fever, cough, sneezing, and headaches since late Tuesday.    She reports experiencing facial pain, particularly in the lymph nodes, oral cavity, and neck. She also had a severe sore throat accompanied by voice loss. As of yesterday, she experienced a tickling sensation in her throat and a slight alteration in her voice. She experienced chills during her febrile episodes, with the highest recorded temperature being 100.8 degrees. Her last episode of fever occurred the previous night. She has been managing her symptoms with over-the-counter medications including Mucinex, Zyrtec, vitamin C, and Tylenol, which she reports as being effective. She has been consuming chicken soup and noodles and feels well enough to drive. She reports an improvement in  her condition today compared to yesterday.    Supplemental Information  She is using Voltaren gel for her hands and takes tizanidine as needed. She is no longer on gabapentin, previously taking 300 mg three times a day, now down to one dose. She is using Latisse for her eyes. She is taking omeprazole 20 mg every day until February for reflux, as advised by Dr. Gutierrez, ENT. Her prep for her colonoscopy is on 01/16/2025.    ALLERGIES  The patient has allergies to CODEINE and LATEX.    MEDICATIONS  Voltaren gel, tizanidine, gabapentin, Latisse, omeprazole, Mucinex, Zyrtec, vitamin C, Tylenol    IMMUNIZATIONS  She has received her influenza vaccine.        The following portions of the patient's history were reviewed and updated as appropriate: allergies, current medications, past family history, past medical history, past social history, past surgical history and problem list.    Review of Systems   Constitutional:  Positive for activity change, chills and fever. Negative for fatigue.   HENT:  Positive for congestion, sneezing, sore throat and voice change. Negative for ear discharge, ear pain, postnasal drip, rhinorrhea, sinus pressure and swollen glands.    Eyes:  Negative for pain, discharge, redness, itching and visual disturbance.   Respiratory:  Negative for cough, shortness of breath, wheezing and stridor.    Skin:  Negative for rash.   Allergic/Immunologic: Negative for environmental allergies.   Neurological:  Positive for headache.   Psychiatric/Behavioral:  Negative for sleep disturbance.        Vitals:    12/12/24 1539   BP: 112/70   Pulse: 93   Resp: 16   Temp: 98.2 °F (36.8 °C)   SpO2: 99%       Objective   Physical Exam  Vitals and nursing note reviewed.   Constitutional:       General: She is not in acute distress.     Appearance: She is well-developed. She is not ill-appearing, toxic-appearing or diaphoretic.   HENT:      Head: Normocephalic and atraumatic.      Right Ear: Tympanic membrane, ear canal  and external ear normal. There is no impacted cerumen.      Left Ear: Tympanic membrane, ear canal and external ear normal. There is no impacted cerumen.      Nose: Mucosal edema and congestion present. No rhinorrhea.      Right Turbinates: Swollen.      Left Turbinates: Swollen.      Mouth/Throat:      Mouth: Mucous membranes are moist.      Pharynx: Oropharynx is clear. No oropharyngeal exudate or posterior oropharyngeal erythema.   Eyes:      General: No scleral icterus.        Right eye: No discharge.         Left eye: No discharge.      Extraocular Movements: Extraocular movements intact.      Conjunctiva/sclera: Conjunctivae normal.      Pupils: Pupils are equal, round, and reactive to light.   Neck:      Thyroid: No thyromegaly.   Cardiovascular:      Rate and Rhythm: Normal rate and regular rhythm.      Heart sounds: Normal heart sounds. No murmur heard.  Pulmonary:      Effort: Pulmonary effort is normal. No respiratory distress.      Breath sounds: Normal breath sounds. No wheezing or rales.   Musculoskeletal:      Cervical back: Normal range of motion and neck supple.   Lymphadenopathy:      Cervical: Cervical adenopathy present.   Skin:     General: Skin is warm and dry.      Coloration: Skin is not pale.      Findings: No erythema or rash.   Neurological:      Mental Status: She is alert and oriented to person, place, and time.      Cranial Nerves: No cranial nerve deficit.   Psychiatric:         Attention and Perception: Attention and perception normal.         Mood and Affect: Mood and affect normal.         Speech: Speech normal.         Behavior: Behavior normal. Behavior is cooperative.         Thought Content: Thought content normal.         Cognition and Memory: Cognition and memory normal.         Judgment: Judgment normal.       Physical Exam  Vital Signs  Blood pressure is normal. Temperature is normal.     BMI is within normal parameters. No other follow-up for BMI required.      Assessment &  Plan   Diagnoses and all orders for this visit:    1. COVID-19 virus infection (Primary)  -     POCT SARS-CoV-2 Antigen NOEL + Flu    Other orders  -     gabapentin (NEURONTIN) 300 MG capsule; Take 1 capsule by mouth Daily.  -     omeprazole (priLOSEC) 20 MG capsule; Take 1 tablet by oral route daily      Assessment & Plan  1. COVID-19 infection.  She tested positive for COVID-19 and reported symptoms including fever, cough, sneezing, headaches, and sore throat. Her fever peaked at 100.8°F and last occurred the previous night. She has been using over-the-counter medications such as Mucinex, Zyrtec, vitamin C, and Tylenol, which seem to be effective. A work note was provided, indicating that she may return to work on Wednesday, 12/18/2024, if her condition improves. She was advised to continue her current regimen of over-the-counter medications. If her symptoms worsen, she should seek further medical attention.     Results  Laboratory Studies  COVID-19 test is positive.          Patient or patient representative verbalized consent for the use of Ambient Listening during the visit with  Kelley Parikh DO for chart documentation. 1/4/2025  10:49 EST

## 2024-12-12 NOTE — LETTER
December 12, 2024     Patient: Ela Paulino   YOB: 1960   Date of Visit: 12/12/2024       To Whom It May Concern:    It is my medical opinion that Ela Paulino has been diagnosed with COVID 19 and may return to work on December 18, 2024.         Sincerely,        Kelley Parikh, DO

## 2025-02-25 ENCOUNTER — OFFICE VISIT (OUTPATIENT)
Dept: FAMILY MEDICINE CLINIC | Facility: CLINIC | Age: 65
End: 2025-02-25
Payer: COMMERCIAL

## 2025-02-25 VITALS
OXYGEN SATURATION: 98 % | SYSTOLIC BLOOD PRESSURE: 96 MMHG | TEMPERATURE: 97.8 F | HEART RATE: 79 BPM | DIASTOLIC BLOOD PRESSURE: 59 MMHG | HEIGHT: 62 IN | WEIGHT: 127 LBS | RESPIRATION RATE: 18 BRPM | BODY MASS INDEX: 23.37 KG/M2

## 2025-02-25 DIAGNOSIS — R23.3 EASY BRUISING: ICD-10-CM

## 2025-02-25 DIAGNOSIS — R25.2 BILATERAL LEG CRAMPS: Primary | ICD-10-CM

## 2025-02-25 DIAGNOSIS — R41.3 MEMORY DEFICIT: ICD-10-CM

## 2025-02-25 PROCEDURE — 99214 OFFICE O/P EST MOD 30 MIN: CPT | Performed by: FAMILY MEDICINE

## 2025-02-25 RX ORDER — METHOCARBAMOL 500 MG/1
500-1000 TABLET, FILM COATED ORAL NIGHTLY PRN
Qty: 30 TABLET | Refills: 0 | Status: SHIPPED | OUTPATIENT
Start: 2025-02-25

## 2025-02-25 RX ORDER — METHYLPREDNISOLONE 4 MG/1
TABLET ORAL
Qty: 21 TABLET | Refills: 0 | Status: SHIPPED | OUTPATIENT
Start: 2025-02-25

## 2025-02-25 NOTE — PROGRESS NOTES
Answers submitted by the patient for this visit:  Problem not listed (Submitted on 2/20/2025)  Chief Complaint: Other medical problem  Reason for appointment: Cold/numb hands and feet, leg dpadms, blood through skin on arms and shins and feet  anorexia: No  joint pain: No  change in stool: No  headaches: Yes  joint swelling: No  vertigo: Yes  visual change: No  Onset: 1 to 6 months  Chronicity: chronic  Frequency: daily  Medications tried: Muscle relaxer  Subjective   Ela Paulino is a 65 y.o. female.     Chief Complaint   Patient presents with    order for BUD    Back Pain    Memory Loss         Current Outpatient Medications:     bimatoprost (Latisse) 0.03 % ophthalmic solution, Apply 1 drop topically to the appropriate area as directed Daily., Disp: , Rfl:     Diclofenac Sodium (VOLTAREN) 1 % gel gel, APPLY 0.5 GRAMS TO THE AFFECTED AREA(S) BY TOPICAL ROUTE 4 TIMES PER DAY, Disp: 100 g, Rfl: 2    gabapentin (NEURONTIN) 300 MG capsule, Take 1 capsule by mouth Daily., Disp: , Rfl:     ketoconazole (NIZORAL) 2 % cream, Apply twice daily to rash at corners of mouth, Disp: 30 g, Rfl: 5    omeprazole (priLOSEC) 20 MG capsule, Take 1 tablet by oral route daily, Disp: 90 capsule, Rfl: 2    triamcinolone (KENALOG) 0.025 % ointment, Apply 2 times daily to lips as needed, Disp: 15 g, Rfl: 1    methocarbamol (ROBAXIN) 500 MG tablet, Take 1-2 tablets by mouth At bedtime As Needed for muscle spasms., Disp: 30 tablet, Rfl: 0    methylPREDNISolone (MEDROL) 4 MG dose pack, Take as directed on package instructions., Disp: 21 tablet, Rfl: 0    Past Medical History:   Diagnosis Date    Abnormal ECG     Allergic     Codein    Arthritis     Cataract     Surgery    Cholelithiasis     Delayed emergence from anesthesia     Depression     DEXA     2017= (0.1/0.1); 2023= (0.0/ 0.1)    Injury of back 2/29/24    Low back pain     MAMMO     NEG = 2024    Meniere's Rt ear     Neck pain, acute     PAP     (ABHILASH Exposure) 2023= NEG    Seizures      As a kid    Thoracic disc disorder 25       Past Surgical History:   Procedure Laterality Date    CATARACT EXTRACTION, BILATERAL      COLONOSCOPY      = NEG, rech        GSI    DIAGNOSTIC LAPAROSCOPY      for poss Tubal Preg    DILATATION AND CURETTAGE      GANGLION CYST EXCISION Right     wrist    LASIK Bilateral     LUMBAR DISCECTOMY Left 05/15/2024    Procedure: Left lumbar 4 5 far lateral microdiscectomy;  Surgeon: Colin Bonilla IV, MD;  Location: [facility] MAIN OR;  Service: Neurosurgery;  Laterality: Left;    VAGINAL DELIVERY       -  X3    WISDOM TOOTH EXTRACTION         Family History   Problem Relation Age of Onset    Hypertension Mother     Arthritis Mother     Heart disease Mother     COPD Father     Lung cancer Father     No Known Problems Sister     Testicular cancer Brother     Mental illness Son         Autism    Heart disease Maternal Grandmother     Stroke Paternal Grandmother     Other Other         FH OF RA- SEVERAL FAMILY MEMBERS       Social History     Socioeconomic History    Marital status:    Tobacco Use    Smoking status: Never     Passive exposure: Never    Smokeless tobacco: Never   Vaping Use    Vaping status: Never Used   Substance and Sexual Activity    Alcohol use: Not Currently    Drug use: Never    Sexual activity: Yes     Partners: Male     Birth control/protection: Post-menopausal       History of Present Illness  The patient presents for evaluation of easy bruising, muscle spasms, memory loss, and numbness in hands and feet.    She reports experiencing numbness in her hands and feet upon waking, accompanied by persistent coldness. She has been informed that these symptoms may be vascular in nature. She has observed spontaneous bruising on her arms, which she has not rubbed or scratched. She is not currently on any anticoagulant therapy. She has a history of bleeding from veins, for which she was referred to a hematologist and oncologist. She had normal  blood work. She has a history of three miscarriages.     She has also sought care at the [facility], where she was referred to dermatology due to skin markings on her arms. She was advised that her skin was aging and that she might be inadvertently causing the bruising. She has also consulted with internal medicine and hematology, both of whom reported normal blood work. She has a history of anemia related to heavy menstrual bleeding, which required hospitalization. She maintains a healthy diet, rich in fruits, vegetables, and proteins, and has been following the [employer] program for 15 years. She does not smoke or consume alcohol but admits to a fondness for chocolate. She consumes dairy products daily and eats eggs. She reports good clotting when she sustains a cut. She has not used NSAIDs or steroid creams.    She experiences severe leg cramping, which prevents her from straightening her legs. The cramps are so severe that she is unable to stand. She also experiences pain when walking up and down stairs. She does not engage in regular stretching exercises. 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 She makes a conscious effort to stand and walk around her desk between patients. The cramps occur both during the day and at night. She has a history of back surgery and is currently taking gabapentin 300 mg daily for back pain. She has been gradually reducing the dose from three times daily. She has not consulted a chiropractor. She has been prescribed tizanidine, which has not provided relief.    She reports experiencing memory loss, which she finds distressing. She has not undergone an MRI scan. She can recall conversations and names but struggles to remember places she has visited. She recently took a trip with her daughter to [place], [place], and [place] but can not recall the details of the trip. She was a passenger during the trip. She has a family history of  Alzheimer's disease, with her grandmother being diagnosed in her early 70s.    She has been experiencing intermittent, severe pain in her lower back for the past three months. The pain is so intense that it causes her to bend over. She has no history of kidney stones. She has not sought chiropractic care for this issue.    She has received her influenza vaccine at work and a Tdap booster in 2024 after potential exposure to pertussis. She has declined further COVID-19 vaccinations, as well as the shingles and pneumonia vaccines. She has a colonoscopy scheduled for 2025.    SOCIAL HISTORY  She does not smoke or drink alcohol.    FAMILY HISTORY  Her father  of lung cancer. Her mother has heart disease and high blood pressure, diagnosed in her 80s. Her paternal grandmother had Alzheimer's disease, which started in her early 70s.    MEDICATIONS  Current: gabapentin, Voltaren gel, tizanidine    IMMUNIZATIONS  She received a Tdap booster in 2024. She has received her influenza vaccine at work. She has declined further COVID-19 vaccinations, as well as the shingles and pneumonia vaccines.        The following portions of the patient's history were reviewed and updated as appropriate: allergies, current medications, past family history, past medical history, past social history, past surgical history and problem list.    Review of Systems   Constitutional:  Negative for chills, diaphoresis, fatigue and fever.   HENT:  Negative for congestion, sore throat and swollen glands.    Respiratory:  Negative for cough.    Cardiovascular:  Negative for chest pain.   Gastrointestinal:  Negative for abdominal pain, nausea and vomiting.   Endocrine: Positive for cold intolerance.   Genitourinary:  Negative for dysuria.   Musculoskeletal:  Positive for back pain and myalgias (ms cramps). Negative for neck pain.   Skin:  Positive for bruise. Negative for rash.   Neurological:  Positive for numbness and memory  problem. Negative for weakness.   Hematological:  Bruises/bleeds easily.   Psychiatric/Behavioral:  Positive for stress.        Vitals:    02/25/25 1502   BP: 96/59   Pulse: 79   Resp: 18   Temp: 97.8 °F (36.6 °C)   SpO2: 98%       Objective   Physical Exam  Vitals and nursing note reviewed.   Constitutional:       General: She is not in acute distress.     Appearance: Normal appearance. She is well-developed and normal weight. She is not ill-appearing or toxic-appearing.   HENT:      Head: Normocephalic and atraumatic.   Neck:      Vascular: No carotid bruit.   Cardiovascular:      Rate and Rhythm: Normal rate and regular rhythm.      Heart sounds: Normal heart sounds. No murmur heard.  Pulmonary:      Effort: Pulmonary effort is normal. No respiratory distress.      Breath sounds: Normal breath sounds. No wheezing, rhonchi or rales.   Musculoskeletal:      Cervical back: Normal range of motion and neck supple.      Right lower leg: No edema.      Left lower leg: No edema.   Skin:     General: Skin is warm and dry.      Findings: Bruising present. No rash. Rash is macular. Rash is not crusting, nodular, purpuric, pustular, scaling, urticarial or vesicular.          Neurological:      Mental Status: She is alert and oriented to person, place, and time.      Cranial Nerves: No cranial nerve deficit.      Gait: Gait normal.   Psychiatric:         Mood and Affect: Mood normal.         Behavior: Behavior normal.         Thought Content: Thought content normal.         Judgment: Judgment normal.       Physical Exam       BMI is within normal parameters. No other follow-up for BMI required.      Assessment & Plan   Diagnoses and all orders for this visit:    1. Bilateral leg cramps (Primary)  -     Doppler Arterial Multi Level Lower Extremity - Bilateral CAR; Future    2. Easy bruising  -     Protime-INR; Future  -     APTT; Future  -     CBC & Differential  -     Iron Profile  -     Ferritin  -     Hemoglobin A1c  -      Vitamin K1; Future    Other orders  -     methylPREDNISolone (MEDROL) 4 MG dose pack; Take as directed on package instructions.  Dispense: 21 tablet; Refill: 0  -     methocarbamol (ROBAXIN) 500 MG tablet; Take 1-2 tablets by mouth At bedtime As Needed for muscle spasms.  Dispense: 30 tablet; Refill: 0      Assessment & Plan  1. Easy bruising.  Her antiphospholipid antibodies were negative in 2022. All autoimmune tests returned normal results. Her blood glucose levels have been slightly elevated, with an A1c of 5.9 in November 2024, 5.8 prior to that, and 6.3 before that. Kidney and liver function tests were within normal limits. Vitamin B12 levels were satisfactory, and vitamin D levels were 32. INR was within the normal range. CBC did not reveal any abnormalities. Blood work will be ordered to assess for bleeding disorders and anemia. An A1c test will also be conducted today. She will be checked for bleeding due to vitamin K deficiency.    2. Muscle spasms.  She has a slight curvature in her spine, which could be contributing to her symptoms. She has not been engaging in regular stretching exercises. A Medrol Dosepak will be prescribed to manage her acute muscle strain. She is advised to perform stretching exercises while on this medication. Methocarbamol 500 mg will be prescribed to be taken at night as needed for muscle spasms.    3. Memory loss.  Her B12 levels were checked in 2024 and were within the normal range. TSH was checked in 2023. An MRI of the brain will be ordered to further investigate her memory loss. Blood work will be done to check for thyroid function, anemia, and other potential causes of memory loss.    4. Health maintenance.  She has received her influenza vaccine at work and a Tdap booster in December 2024 after potential exposure to pertussis. She has declined further COVID-19 vaccinations, as well as the shingles and pneumonia vaccines. She has a colonoscopy scheduled for June 2025.    5.  Lower back pain.  She has been experiencing intermittent, severe pain in her lower back for the past three months. The pain is so intense that it causes her to bend over. She has no history of kidney stones. She has not sought chiropractic care for this issue.    PROCEDURE  The patient underwent back surgery in 2024.     Results  Laboratory Studies  Antiphospholipid antibodies were negative. Inflammatory markers were high. VEENA was elevated. A1c was 5.9 in November, 5.8 before that, and 6.3 prior to that. Vitamin D was 32. INR was fine. CBC showed no abnormalities.          Patient or patient representative verbalized consent for the use of Ambient Listening during the visit with  Kelley Parikh DO for chart documentation. 3/22/2025  10:47 EDT

## 2025-02-26 ENCOUNTER — LAB (OUTPATIENT)
Dept: LAB | Facility: HOSPITAL | Age: 65
End: 2025-02-26
Payer: COMMERCIAL

## 2025-02-26 DIAGNOSIS — R23.3 EASY BRUISING: ICD-10-CM

## 2025-02-26 DIAGNOSIS — R41.3 MEMORY DEFICIT: ICD-10-CM

## 2025-02-26 LAB
APTT PPP: 32.9 SECONDS (ref 22.7–35.4)
BASOPHILS # BLD AUTO: 0.07 10*3/MM3 (ref 0–0.2)
BASOPHILS NFR BLD AUTO: 1.1 % (ref 0–1.5)
DEPRECATED RDW RBC AUTO: 46.8 FL (ref 37–54)
EOSINOPHIL # BLD AUTO: 0.13 10*3/MM3 (ref 0–0.4)
EOSINOPHIL NFR BLD AUTO: 2.1 % (ref 0.3–6.2)
ERYTHROCYTE [DISTWIDTH] IN BLOOD BY AUTOMATED COUNT: 14.1 % (ref 12.3–15.4)
FERRITIN SERPL-MCNC: 82.4 NG/ML (ref 13–150)
HBA1C MFR BLD: 5.99 % (ref 4.8–5.6)
HCT VFR BLD AUTO: 40.4 % (ref 34–46.6)
HGB BLD-MCNC: 12.4 G/DL (ref 12–15.9)
HIV 1+2 AB+HIV1 P24 AG SERPL QL IA: NORMAL
IMM GRANULOCYTES # BLD AUTO: 0.03 10*3/MM3 (ref 0–0.05)
IMM GRANULOCYTES NFR BLD AUTO: 0.5 % (ref 0–0.5)
INR PPP: 1.04 (ref 0.9–1.1)
IRON 24H UR-MRATE: 60 MCG/DL (ref 37–145)
IRON SATN MFR SERPL: 15 % (ref 20–50)
LYMPHOCYTES # BLD AUTO: 1.5 10*3/MM3 (ref 0.7–3.1)
LYMPHOCYTES NFR BLD AUTO: 24.6 % (ref 19.6–45.3)
MCH RBC QN AUTO: 27.6 PG (ref 26.6–33)
MCHC RBC AUTO-ENTMCNC: 30.7 G/DL (ref 31.5–35.7)
MCV RBC AUTO: 90 FL (ref 79–97)
MONOCYTES # BLD AUTO: 0.49 10*3/MM3 (ref 0.1–0.9)
MONOCYTES NFR BLD AUTO: 8 % (ref 5–12)
NEUTROPHILS NFR BLD AUTO: 3.87 10*3/MM3 (ref 1.7–7)
NEUTROPHILS NFR BLD AUTO: 63.7 % (ref 42.7–76)
NRBC BLD AUTO-RTO: 0 /100 WBC (ref 0–0.2)
PLATELET # BLD AUTO: 429 10*3/MM3 (ref 140–450)
PMV BLD AUTO: 8.8 FL (ref 6–12)
PROTHROMBIN TIME: 13.5 SECONDS (ref 11.7–14.2)
RBC # BLD AUTO: 4.49 10*6/MM3 (ref 3.77–5.28)
RPR SER QL: NORMAL
T4 FREE SERPL-MCNC: 1.35 NG/DL (ref 0.92–1.68)
TIBC SERPL-MCNC: 407 MCG/DL (ref 298–536)
TRANSFERRIN SERPL-MCNC: 273 MG/DL (ref 200–360)
TSH SERPL DL<=0.05 MIU/L-ACNC: 1.39 UIU/ML (ref 0.27–4.2)
WBC NRBC COR # BLD AUTO: 6.09 10*3/MM3 (ref 3.4–10.8)

## 2025-02-26 PROCEDURE — 85610 PROTHROMBIN TIME: CPT

## 2025-02-26 PROCEDURE — 85025 COMPLETE CBC W/AUTO DIFF WBC: CPT | Performed by: FAMILY MEDICINE

## 2025-02-26 PROCEDURE — G0432 EIA HIV-1/HIV-2 SCREEN: HCPCS

## 2025-02-26 PROCEDURE — 84443 ASSAY THYROID STIM HORMONE: CPT | Performed by: FAMILY MEDICINE

## 2025-02-26 PROCEDURE — 84439 ASSAY OF FREE THYROXINE: CPT | Performed by: FAMILY MEDICINE

## 2025-02-26 PROCEDURE — 85730 THROMBOPLASTIN TIME PARTIAL: CPT

## 2025-02-26 PROCEDURE — 84597 ASSAY OF VITAMIN K: CPT

## 2025-02-26 PROCEDURE — 86592 SYPHILIS TEST NON-TREP QUAL: CPT

## 2025-02-26 PROCEDURE — 83036 HEMOGLOBIN GLYCOSYLATED A1C: CPT | Performed by: FAMILY MEDICINE

## 2025-02-26 PROCEDURE — 82728 ASSAY OF FERRITIN: CPT | Performed by: FAMILY MEDICINE

## 2025-02-26 PROCEDURE — 36415 COLL VENOUS BLD VENIPUNCTURE: CPT

## 2025-02-26 PROCEDURE — 83540 ASSAY OF IRON: CPT | Performed by: FAMILY MEDICINE

## 2025-02-26 PROCEDURE — 84466 ASSAY OF TRANSFERRIN: CPT | Performed by: FAMILY MEDICINE

## 2025-02-28 ENCOUNTER — PATIENT ROUNDING (BHMG ONLY) (OUTPATIENT)
Dept: FAMILY MEDICINE CLINIC | Facility: CLINIC | Age: 65
End: 2025-02-28
Payer: COMMERCIAL

## 2025-03-06 LAB — PHYTONADIONE SERPL-MCNC: 0.32 NG/ML (ref 0.1–2.2)

## 2025-03-26 ENCOUNTER — TELEPHONE (OUTPATIENT)
Dept: FAMILY MEDICINE CLINIC | Facility: CLINIC | Age: 65
End: 2025-03-26
Payer: COMMERCIAL

## 2025-03-26 DIAGNOSIS — R41.3 MEMORY DEFICIT: Primary | ICD-10-CM

## 2025-03-26 NOTE — TELEPHONE ENCOUNTER
HUB TO RELAY      Kelley Parikh, DO      Let pt know.......     If she wants to see neuro for the memory issue and see what they say, I will refer her      LEFT VOICEMAIL INFORMING PT AND SENT MYCHART MESSAGE

## 2025-03-27 ENCOUNTER — APPOINTMENT (OUTPATIENT)
Dept: MRI IMAGING | Facility: HOSPITAL | Age: 65
End: 2025-03-27
Payer: COMMERCIAL

## 2025-03-27 ENCOUNTER — HOSPITAL ENCOUNTER (OUTPATIENT)
Dept: CARDIOLOGY | Facility: HOSPITAL | Age: 65
Discharge: HOME | End: 2025-03-27
Admitting: FAMILY MEDICINE
Payer: COMMERCIAL

## 2025-03-27 DIAGNOSIS — R25.2 BILATERAL LEG CRAMPS: ICD-10-CM

## 2025-03-27 LAB
BH CV LOWER ARTERIAL LEFT ABI RATIO: 1.33
BH CV LOWER ARTERIAL LEFT DORSALIS PEDIS SYS MAX: 144
BH CV LOWER ARTERIAL LEFT GREAT TOE SYS MAX: 105
BH CV LOWER ARTERIAL LEFT POST TIBIAL SYS MAX: 114
BH CV LOWER ARTERIAL LEFT TBI RATIO: 0.97
BH CV LOWER ARTERIAL RIGHT ABI RATIO: 1.37
BH CV LOWER ARTERIAL RIGHT DORSALIS PEDIS SYS MAX: 146
BH CV LOWER ARTERIAL RIGHT GREAT TOE SYS MAX: 98
BH CV LOWER ARTERIAL RIGHT POST TIBIAL SYS MAX: 148
BH CV LOWER ARTERIAL RIGHT TBI RATIO: 0.91
UPPER ARTERIAL LEFT ARM BRACHIAL SYS MAX: 108
UPPER ARTERIAL RIGHT ARM BRACHIAL SYS MAX: 106

## 2025-03-27 PROCEDURE — 93922 UPR/L XTREMITY ART 2 LEVELS: CPT

## 2025-04-25 ENCOUNTER — PATIENT ROUNDING (BHMG ONLY) (OUTPATIENT)
Dept: NEUROLOGY | Facility: CLINIC | Age: 65
End: 2025-04-25
Payer: COMMERCIAL

## 2025-04-25 ENCOUNTER — OFFICE VISIT (OUTPATIENT)
Dept: NEUROLOGY | Facility: CLINIC | Age: 65
End: 2025-04-25
Payer: COMMERCIAL

## 2025-04-25 VITALS
WEIGHT: 124 LBS | OXYGEN SATURATION: 97 % | HEART RATE: 65 BPM | BODY MASS INDEX: 22.82 KG/M2 | SYSTOLIC BLOOD PRESSURE: 112 MMHG | HEIGHT: 62 IN | DIASTOLIC BLOOD PRESSURE: 80 MMHG

## 2025-04-25 DIAGNOSIS — R41.3 MEMORY LOSS: Primary | ICD-10-CM

## 2025-04-25 DIAGNOSIS — G25.0 BENIGN ESSENTIAL TREMOR: ICD-10-CM

## 2025-04-25 NOTE — PROGRESS NOTES
Chief Complaint   Patient presents with    Memory Loss    Headache    Tremors       Patient ID: Ela Paulino is a 65 y.o. female.    HPI: I had the pleasure of seeing your patient today.  As you may know she is a 65-year-old female being seen at the request of and referred to us by Dr. Moncada for a history of memory change and tremor.  She states that her short-term memory changes have been going on for the past year.  She says that she has a lot of trouble remembering names.  Sometimes dates of important events can be trouble for her to recall.  She also has noted that in conversation it is harder for her to come up with certain words.  She will try to recall a conversation and sometimes be unable to do so.  Usually if she has some word finding trouble with in a conversation she is able to find the word eventually.  She typically has no issues with driving.  She is able to perform all instrumental activities of daily life without any issue.  No trouble maintaining her finances.  She is still working and denies any trouble with the protocols there.  She did have a head injury back in her 20s.  She states that a rack fell on her.  She did lose consciousness briefly.  She had some minor symptoms at that time that resolved relatively soon after.  No history of stroke or strokelike symptoms.  No significant alcoholism.  Her paternal  grandmother apparently had Alzheimer's disease.  Her tremor consists of shakiness with use typically.  She says that her dad had a tremor also.  She denies any slowness of movement.  No change of facial expressions.  No drooling or pooling of saliva.    The following portions of the patient's history were reviewed and updated as appropriate: allergies, current medications, past family history, past medical history, past social history, past surgical history and problem list.    Review of Systems   Constitutional:  Positive for fatigue.   Eyes:  Positive for visual disturbance (occasional  double vision).   Neurological:  Positive for dizziness, tremors, speech difficulty (word finding), weakness (whole body & hands) and headaches. Negative for seizures, syncope, facial asymmetry, light-headedness and numbness (feet & hands).   Psychiatric/Behavioral:  Positive for sleep disturbance (legs shaking). Negative for agitation, behavioral problems, confusion, decreased concentration, dysphoric mood, hallucinations, self-injury and suicidal ideas. The patient is not nervous/anxious and is not hyperactive.       I have reviewed the review of systems above performed by my medical assistant.      Vitals:    04/25/25 1035   BP: 112/80   Pulse: 65   SpO2: 97%       Neurological Exam  Mental Status  Awake, alert and oriented to person, place and time. Recalls 3 of 3 objects immediately. At 3 minutes recalls 2 of 3 objects. Recalls 3 of 3 objects with prompting. Speech is normal. Language is fluent with no aphasia. Attention and concentration are normal. Fund of knowledge is appropriate for level of education. MMSE score: 28.    Cranial Nerves  CN I: Sense of smell is normal.  CN II: Visual acuity is normal.  CN III, IV, VI: Extraocular movements intact bilaterally. Pupils equal round and reactive to light bilaterally.  CN V: Facial sensation is normal.  CN VII: Full and symmetric facial movement.  CN XI: Shoulder shrug strength is normal.  CN XII: Tongue midline without atrophy or fasciculations.    Motor  Normal muscle bulk throughout. No fasciculations present. Normal muscle tone. No abnormal involuntary movements. No pronator drift.                                             Right                     Left  Rhomboids                            5                          5  Infraspinatus                          5                          5  Supraspinatus                       5                          5  Deltoid                                   5                          5   Biceps                                    5                          5  Brachioradialis                      5                          5   Triceps                                  5                          5   Pronator                                5                          5   Supinator                              5                           5   Wrist flexor                            5                          5   Wrist extensor                       5                          5   Finger flexor                          5                          5   Finger extensor                     5                          5   Interossei                              5                          5   Abductor pollicis brevis         5                          5   Flexor pollicis brevis             5                          5   Opponens pollicis                 5                          5  Extensor digitorum               5                          5  Abductor digiti minimi           5                          5   Abdominal                            5                          5  Glutei                                    5                          5  Hip abductor                         5                          5  Hip adductor                         5                          5   Iliopsoas                               5                          5   Quadriceps                           5                          5   Hamstring                             5                          5   Gastrocnemius                     5                           5   Anterior tibialis                      5                          5   Posterior tibialis                    5                          5   Peroneal                               5                          5  Ankle dorsiflexor                   5                          5  Ankle plantar flexor              5                           5  Extensor hallucis longus      5                            5    Sensory  Sensation is intact to light touch, pinprick, vibration and proprioception in all four extremities.    Reflexes  Deep tendon reflexes are 2+ and symmetric in all four extremities.    Right pathological reflexes: Eligio's absent.  Left pathological reflexes: Eligio's absent.    Coordination    Finger-to-nose, rapid alternating movements and heel-to-shin normal bilaterally without dysmetria.    Gait  Normal casual, toe, heel and tandem gait.       Physical Exam  Vitals reviewed.   Constitutional:       General: She is not in acute distress.     Appearance: She is well-developed.   HENT:      Head: Normocephalic and atraumatic.   Eyes:      Extraocular Movements: Extraocular movements intact.      Pupils: Pupils are equal, round, and reactive to light.   Cardiovascular:      Rate and Rhythm: Normal rate and regular rhythm.      Heart sounds: Normal heart sounds.   Pulmonary:      Effort: Pulmonary effort is normal. No respiratory distress.      Breath sounds: Normal breath sounds.   Abdominal:      General: Bowel sounds are normal. There is no distension.      Palpations: Abdomen is soft.      Tenderness: There is no abdominal tenderness.   Musculoskeletal:         General: No deformity.      Cervical back: Normal range of motion.   Skin:     General: Skin is warm.      Findings: No rash.   Neurological:      Coordination: Coordination is intact.      Deep Tendon Reflexes: Reflexes are normal and symmetric.   Psychiatric:         Speech: Speech normal.         Judgment: Judgment normal.         Procedures    Assessment/Plan: Her Mini-Mental examination was 28.  I do not feel that she has dementia, however we will refer her for neuropsych testing to rule out mild cognitive  impairment.  We will also schedule MRI imaging of her brain both with and without contrast.  She certainly has benign essential tremor.  We have decided to forego medical management of that for now.  We will check back on that at  her next visit after neuropsych testing.       Diagnoses and all orders for this visit:    1. Memory loss (Primary)  -     MRI Brain With & Without Contrast; Future  -     Ambulatory Referral to Neuropsychology    2. Benign essential tremor           Kane Perrin II, MD

## 2025-05-01 ENCOUNTER — TELEPHONE (OUTPATIENT)
Dept: NEUROLOGY | Facility: CLINIC | Age: 65
End: 2025-05-01
Payer: COMMERCIAL

## 2025-05-01 NOTE — TELEPHONE ENCOUNTER
Caller: Ela Paulino    Relationship: Self    Best call back number: [phone] (home)   What was the call regarding: PT CALLED REGARDING HER MRI. SHE HAS IT SCHEDULED FOR 5/16/25. SHE IS TO HAVE A FOLLOW UP BUT FIRST AVAILABLE IS NOT UNTIL OCT.     PLEASE ADVISE  THANK YOU     Is it okay if the provider responds through [employer]: YES

## 2025-05-05 RX ORDER — GABAPENTIN 300 MG/1
300 CAPSULE ORAL DAILY
Qty: 90 CAPSULE | Refills: 0 | Status: SHIPPED | OUTPATIENT
Start: 2025-05-05

## 2025-05-09 RX ORDER — DEXAMETHASONE 4 MG/1
4 TABLET ORAL 2 TIMES DAILY WITH MEALS
Qty: 35 TABLET | Refills: 0 | Status: SHIPPED | OUTPATIENT
Start: 2025-05-09

## 2025-05-09 NOTE — TELEPHONE ENCOUNTER
Patient called stating she's having pain and that her foot is dragging and weak and not doing what she is telling it to do. I spoke with Mireille she did suggest go to the ed in [place] in case foot drop but patient stated no. We will send in a steroid and if they dont work call back on Monday and we can try and fit her in. Patient understood

## 2025-05-14 ENCOUNTER — TRANSCRIBE ORDERS (OUTPATIENT)
Dept: ADMINISTRATIVE | Facility: HOSPITAL | Age: 65
End: 2025-05-14
Payer: COMMERCIAL

## 2025-05-14 ENCOUNTER — LAB (OUTPATIENT)
Dept: LAB | Facility: HOSPITAL | Age: 65
End: 2025-05-14
Payer: COMMERCIAL

## 2025-05-14 DIAGNOSIS — H53.2 DIPLOPIA: Primary | ICD-10-CM

## 2025-05-14 DIAGNOSIS — H53.2 DIPLOPIA: ICD-10-CM

## 2025-05-14 LAB
T3 SERPL-MCNC: 33.5 NG/DL (ref 80–200)
T4 SERPL-MCNC: 5.99 MCG/DL (ref 4.5–11.7)
TSH SERPL DL<=0.05 MIU/L-ACNC: 0.16 UIU/ML (ref 0.27–4.2)

## 2025-05-14 PROCEDURE — 84436 ASSAY OF TOTAL THYROXINE: CPT

## 2025-05-14 PROCEDURE — 84443 ASSAY THYROID STIM HORMONE: CPT

## 2025-05-14 PROCEDURE — 86800 THYROGLOBULIN ANTIBODY: CPT

## 2025-05-14 PROCEDURE — 36415 COLL VENOUS BLD VENIPUNCTURE: CPT

## 2025-05-14 PROCEDURE — 84480 ASSAY TRIIODOTHYRONINE (T3): CPT

## 2025-05-14 PROCEDURE — 86376 MICROSOMAL ANTIBODY EACH: CPT

## 2025-05-15 LAB
THYROGLOB AB SERPL-ACNC: <1 IU/ML (ref 0–0.9)
THYROPEROXIDASE AB SERPL-ACNC: 9 IU/ML (ref 0–34)

## 2025-05-16 ENCOUNTER — HOSPITAL ENCOUNTER (OUTPATIENT)
Dept: MRI IMAGING | Facility: HOSPITAL | Age: 65
Discharge: HOME | End: 2025-05-16
Admitting: PSYCHIATRY & NEUROLOGY
Payer: COMMERCIAL

## 2025-05-16 DIAGNOSIS — R41.3 MEMORY LOSS: ICD-10-CM

## 2025-05-16 PROCEDURE — 70553 MRI BRAIN STEM W/O & W/DYE: CPT

## 2025-05-16 PROCEDURE — A9579 GAD-BASE MR CONTRAST NOS,1ML: HCPCS | Performed by: PSYCHIATRY & NEUROLOGY

## 2025-05-16 PROCEDURE — 25010000002 GADOTERIDOL PER 1 ML: Performed by: PSYCHIATRY & NEUROLOGY

## 2025-05-16 RX ADMIN — GADOTERIDOL 11.24 ML: 279.3 INJECTION, SOLUTION INTRAVENOUS at 07:51

## 2025-05-21 ENCOUNTER — TELEPHONE (OUTPATIENT)
Dept: NEUROLOGY | Facility: CLINIC | Age: 65
End: 2025-05-21
Payer: COMMERCIAL

## 2025-05-23 ENCOUNTER — OFFICE VISIT (OUTPATIENT)
Dept: FAMILY MEDICINE CLINIC | Facility: CLINIC | Age: 65
End: 2025-05-23
Payer: COMMERCIAL

## 2025-05-23 VITALS
DIASTOLIC BLOOD PRESSURE: 66 MMHG | TEMPERATURE: 97.3 F | HEIGHT: 62 IN | HEART RATE: 93 BPM | BODY MASS INDEX: 23.19 KG/M2 | RESPIRATION RATE: 14 BRPM | OXYGEN SATURATION: 95 % | SYSTOLIC BLOOD PRESSURE: 97 MMHG | WEIGHT: 126 LBS

## 2025-05-23 DIAGNOSIS — R73.9 HYPERGLYCEMIA: ICD-10-CM

## 2025-05-23 DIAGNOSIS — E61.1 IRON DEFICIENCY: ICD-10-CM

## 2025-05-23 DIAGNOSIS — L03.032 PARONYCHIA OF GREAT TOE, LEFT: Primary | ICD-10-CM

## 2025-05-23 DIAGNOSIS — R79.89 ABNORMAL TSH: ICD-10-CM

## 2025-05-23 DIAGNOSIS — E05.90 HYPERTHYROIDISM: ICD-10-CM

## 2025-05-23 PROCEDURE — 83540 ASSAY OF IRON: CPT | Performed by: FAMILY MEDICINE

## 2025-05-23 PROCEDURE — 84443 ASSAY THYROID STIM HORMONE: CPT | Performed by: FAMILY MEDICINE

## 2025-05-23 PROCEDURE — 83036 HEMOGLOBIN GLYCOSYLATED A1C: CPT | Performed by: FAMILY MEDICINE

## 2025-05-23 PROCEDURE — 82728 ASSAY OF FERRITIN: CPT | Performed by: FAMILY MEDICINE

## 2025-05-23 PROCEDURE — 85025 COMPLETE CBC W/AUTO DIFF WBC: CPT | Performed by: FAMILY MEDICINE

## 2025-05-23 PROCEDURE — 84481 FREE ASSAY (FT-3): CPT | Performed by: FAMILY MEDICINE

## 2025-05-23 PROCEDURE — 84466 ASSAY OF TRANSFERRIN: CPT | Performed by: FAMILY MEDICINE

## 2025-05-23 PROCEDURE — 84439 ASSAY OF FREE THYROXINE: CPT | Performed by: FAMILY MEDICINE

## 2025-05-23 RX ORDER — FERROUS SULFATE 325(65) MG
325 TABLET ORAL
Start: 2025-05-23 | End: 2025-06-22

## 2025-05-23 RX ORDER — CEPHALEXIN 500 MG/1
500 CAPSULE ORAL 4 TIMES DAILY
Qty: 28 CAPSULE | Refills: 0 | Status: SHIPPED | OUTPATIENT
Start: 2025-05-23

## 2025-05-23 RX ORDER — METHIMAZOLE 5 MG/1
5 TABLET ORAL DAILY
Qty: 30 TABLET | Refills: 1 | Status: SHIPPED | OUTPATIENT
Start: 2025-05-23 | End: 2025-05-27

## 2025-05-23 NOTE — PROGRESS NOTES
Subjective   Ela Paulino is a 65 y.o. female.     Chief Complaint   Patient presents with    Nail Problem     Toenail problem and thyroid check    Eye Problem         Current Outpatient Medications:     bimatoprost (Latisse) 0.03 % ophthalmic solution, Apply 1 drop topically to the appropriate area as directed Daily., Disp: , Rfl:     Diclofenac Sodium (VOLTAREN) 1 % gel gel, APPLY 0.5 GRAMS TO THE AFFECTED AREA(S) BY TOPICAL ROUTE 4 TIMES PER DAY, Disp: 100 g, Rfl: 2    gabapentin (NEURONTIN) 300 MG capsule, Take 1 capsule by mouth Daily., Disp: 90 capsule, Rfl: 0    ketoconazole (NIZORAL) 2 % cream, Apply twice daily to rash at corners of mouth, Disp: 30 g, Rfl: 5    cephalexin (KEFLEX) 500 MG capsule, Take 1 capsule by mouth 4 (Four) Times a Day., Disp: 28 capsule, Rfl: 0    Past Medical History:   Diagnosis Date    Abnormal ECG     Allergic     Codein    Arthritis     Cataract     Surgery    Cholelithiasis     Delayed emergence from anesthesia     Depression     DEXA     = (0.1/0.1); = (0.0/ 0.1)    Hyperthyroidism     Injury of back 25    Low back pain     MAMMO     NEG =     Meniere's Rt ear     Neck pain, acute     PAP     (ABHILASH Exposure) = NEG    Seizures     As a kid    Thoracic disc disorder 25       Past Surgical History:   Procedure Laterality Date    CATARACT EXTRACTION, BILATERAL      COLONOSCOPY      = NEG, rech        GSI    DIAGNOSTIC LAPAROSCOPY      for poss Tubal Preg    DILATATION AND CURETTAGE      GANGLION CYST EXCISION Right     wrist    LASIK Bilateral     LUMBAR DISCECTOMY Left 05/15/2024    Procedure: Left lumbar 4 5 far lateral microdiscectomy;  Surgeon: Colin Bonilla IV, MD;  Location: [facility] MAIN OR;  Service: Neurosurgery;  Laterality: Left;    VAGINAL DELIVERY       -  X3    WISDOM TOOTH EXTRACTION         Family History   Problem Relation Age of Onset    Hypertension Mother     Arthritis Mother     Heart disease Mother     COPD Father      Lung cancer Father     No Known Problems Sister     Testicular cancer Brother     Heart disease Maternal Grandmother     Stroke Paternal Grandmother     Dementia Paternal Grandmother     Mental illness Son         Autism       Social History     Socioeconomic History    Marital status:    Tobacco Use    Smoking status: Never     Passive exposure: Never    Smokeless tobacco: Never   Vaping Use    Vaping status: Never Used   Substance and Sexual Activity    Alcohol use: Not Currently    Drug use: Never    Sexual activity: Yes     Partners: Male     Birth control/protection: Post-menopausal       History of Present Illness  The patient is a 65-year-old female here for follow-up from thyroid labs done at the request of ophthalmology due to some eye complaints and also painful toe.    She reports experiencing fatigue, shakiness, and visual disturbances, including double vision and a sensation akin to looking underwater. These symptoms have been present for approximately 1 to 2 months. She does not experience any ocular pain or itching. She has been under the care of Dr. Jacinto, who has been monitoring her for macular degeneration and suggested that her symptoms may be related to her thyroid condition. She has been advised to consult another ophthalmologist for further evaluation of her eye muscles.    She also reports constipation over the past few months, constant feeling of cold, and excessive thirst. She is uncertain if her constipation is a side effect of the ferrous sulfate she is taking. She has been making efforts to manage her weight, with her weight fluctuating between 123 and 126 pounds today. She reports low blood pressure and poor sleep quality, often waking up after an hour and a half of sleep. She does not have any difficulty swallowing but experiences dry mouth. She describes feeling anxious and shaky internally. She has been taking over-the-counter ferrous sulfate.    She has an infected toenail,  which she has been managing with Neosporin for the past week. She has not attempted to soak the affected area. There is no presence of pus. It has been a while since she had her nails done. She has not attempted to dig into the nail.    She had breast leakage years ago and was found to have issues with her pituitary gland, which were checked regularly, but it has been a while since they last checked it.    SOCIAL HISTORY  She does not smoke.    FAMILY HISTORY  She does not have a family history of thyroid issues.        The following portions of the patient's history were reviewed and updated as appropriate: allergies, current medications, past family history, past medical history, past social history, past surgical history and problem list.    Review of Systems   Constitutional:  Positive for fatigue. Negative for chills, diaphoresis and fever.   HENT:  Negative for congestion, sore throat, swollen glands and trouble swallowing.    Eyes:  Positive for blurred vision, double vision and visual disturbance. Negative for pain and itching.   Respiratory:  Negative for cough.    Cardiovascular:  Negative for chest pain.   Gastrointestinal:  Positive for constipation. Negative for abdominal pain, diarrhea, nausea and vomiting.   Endocrine: Positive for cold intolerance and polydipsia. Negative for heat intolerance.   Genitourinary:  Negative for dysuria.   Musculoskeletal:  Positive for myalgias. Negative for neck pain.   Skin:  Positive for color change and wound. Negative for rash.   Neurological:  Positive for weakness and numbness.   Psychiatric/Behavioral:  Positive for sleep disturbance. The patient is nervous/anxious.        Vitals:    05/23/25 1532   BP: 97/66   Pulse: 93   Resp: 14   Temp: 97.3 °F (36.3 °C)   SpO2: 95%       Objective   Physical Exam  Vitals and nursing note reviewed.   Constitutional:       General: She is not in acute distress.     Appearance: She is normal weight. She is not ill-appearing or  toxic-appearing.   HENT:      Head: Normocephalic and atraumatic.   Eyes:      General: Lids are normal.      Extraocular Movements: Extraocular movements intact.      Conjunctiva/sclera: Conjunctivae normal.   Cardiovascular:      Rate and Rhythm: Normal rate and regular rhythm.      Heart sounds: No murmur heard.  Pulmonary:      Effort: Pulmonary effort is normal.   Musculoskeletal:        Feet:    Feet:      Left foot:      Toenail Condition: Left toenails are ingrown.      Comments: +TTP @ left Great toe medial aspect w/ mild erythema  Skin:     Findings: No rash.   Neurological:      Mental Status: She is alert and oriented to person, place, and time.      Cranial Nerves: No cranial nerve deficit.   Psychiatric:         Attention and Perception: Attention and perception normal.         Mood and Affect: Mood and affect normal.         Speech: Speech normal.         Behavior: Behavior normal. Behavior is cooperative.         Thought Content: Thought content normal.         Cognition and Memory: Cognition and memory normal.         Judgment: Judgment normal.       Physical Exam       BMI is within normal parameters. No other follow-up for BMI required.      Assessment & Plan   Diagnoses and all orders for this visit:    1. Paronychia of great toe, left (Primary)    2. Hyperthyroidism  -     Ambulatory Referral to Endocrinology    3. Iron deficiency  -     Iron Profile  -     Ferritin  -     CBC & Differential    4. Hyperglycemia  -     Hemoglobin A1c    5. Abnormal TSH  -     T4, Free  -     Cancel: T3, free; Future  -     TSH  -     T3, free    Other orders  -     Discontinue: ferrous sulfate 325 (65 FE) MG tablet; Take 1 tablet by mouth Daily With Breakfast.  -     Discontinue: methIMAzole (TAPAZOLE) 5 MG tablet; Take 1 tablet by mouth Daily.  Dispense: 30 tablet; Refill: 1  -     cephalexin (KEFLEX) 500 MG capsule; Take 1 capsule by mouth 4 (Four) Times a Day.  Dispense: 28 capsule; Refill: 0      Assessment &  Plan  1. Hyperthyroidism.  Her total T3 levels are elevated----- Her free T4 levels were within the normal range during the last evaluation in 02/2025. She reports fatigue, shakiness, double vision, and visual disturbances.  Methimazole 5 mg once daily with food has been prescribed. The potential side effects of methimazole, including rash, hives, nausea, vomiting, heartburn, hair loss, loss of taste, and headache, have been discussed.  She has been advised to discontinue the medication immediately and inform us if she develops jaundice, dark urine, or light stools. A referral to an endocrinologist has been made.  Her thyroid function will be monitored at 4-week intervals until stabilization on maintenance therapy is achieved.    2. Constipation.  She reports new-onset constipation over the past few months. She is currently taking ferrous sulfate, which may contribute to constipation.  She has been advised to continue ferrous sulfate for at least 3 months, after which her iron levels will be reassessed.  If there is no improvement, iron infusions will be considered.  Her iron levels will be monitored to assess the effectiveness of the treatment.    3. Prediabetes.  Her A1c levels indicate prediabetes, with a current level of 5.99 or 6, up from 5.8 six months ago.  She has been advised to follow up on her A1c levels.  A follow-up appointment will be scheduled to monitor her A1c levels.  Her blood sugar levels will be monitored to assess the progression of prediabetes.    4. Infected toenail.  She reports a sore and tender toenail that has been present for about a week. There is no pus discharge. She has been using Neosporin topically.  She has been advised to soak the affected area and apply a topical antibiotic such as mupirocin or bacitracin.  Keflex 500 mg four times daily for one week has been prescribed.  The effectiveness of the treatment will be monitored to ensure resolution of the infection.      Results  Labs   - Iron: 02/2025, Low   - A1c: 5.99 or 6   - A1c: 11/2024, 5.8   - Free T4: 02/2025, Normal   - Total T4: Normal   - Total T3: Abnormal          Patient or patient representative verbalized consent for the use of Ambient Listening during the visit with  Kelley Parikh DO for chart documentation. 6/22/2025  11:01 EDT  Answers submitted by the patient for this visit:  Problem not listed (Submitted on 5/23/2025)  Chief Complaint: Other medical problem  Reason for appointment: Testing  anorexia: No  joint pain: Yes  change in stool: Yes  headaches: Yes  joint swelling: No  vertigo: Yes  visual change: Yes  Onset: 1 to 6 months  Chronicity: new  Frequency: daily

## 2025-05-24 LAB
BASOPHILS # BLD AUTO: 0.03 10*3/MM3 (ref 0–0.2)
BASOPHILS NFR BLD AUTO: 0.3 % (ref 0–1.5)
DEPRECATED RDW RBC AUTO: 45.6 FL (ref 37–54)
EOSINOPHIL # BLD AUTO: 0.17 10*3/MM3 (ref 0–0.4)
EOSINOPHIL NFR BLD AUTO: 1.5 % (ref 0.3–6.2)
ERYTHROCYTE [DISTWIDTH] IN BLOOD BY AUTOMATED COUNT: 14.4 % (ref 12.3–15.4)
FERRITIN SERPL-MCNC: 107 NG/ML (ref 13–150)
HBA1C MFR BLD: 6.1 % (ref 4.8–5.6)
HCT VFR BLD AUTO: 43.9 % (ref 34–46.6)
HGB BLD-MCNC: 14.8 G/DL (ref 12–15.9)
IMM GRANULOCYTES # BLD AUTO: 0.18 10*3/MM3 (ref 0–0.05)
IMM GRANULOCYTES NFR BLD AUTO: 1.6 % (ref 0–0.5)
IRON 24H UR-MRATE: 43 MCG/DL (ref 37–145)
IRON SATN MFR SERPL: 10 % (ref 20–50)
LYMPHOCYTES # BLD AUTO: 3.14 10*3/MM3 (ref 0.7–3.1)
LYMPHOCYTES NFR BLD AUTO: 27 % (ref 19.6–45.3)
MCH RBC QN AUTO: 29.5 PG (ref 26.6–33)
MCHC RBC AUTO-ENTMCNC: 33.7 G/DL (ref 31.5–35.7)
MCV RBC AUTO: 87.5 FL (ref 79–97)
MONOCYTES # BLD AUTO: 1.04 10*3/MM3 (ref 0.1–0.9)
MONOCYTES NFR BLD AUTO: 9 % (ref 5–12)
NEUTROPHILS NFR BLD AUTO: 60.6 % (ref 42.7–76)
NEUTROPHILS NFR BLD AUTO: 7.05 10*3/MM3 (ref 1.7–7)
NRBC BLD AUTO-RTO: 0 /100 WBC (ref 0–0.2)
PLATELET # BLD AUTO: 362 10*3/MM3 (ref 140–450)
PMV BLD AUTO: 9.3 FL (ref 6–12)
RBC # BLD AUTO: 5.02 10*6/MM3 (ref 3.77–5.28)
T3FREE SERPL-MCNC: 3.07 PG/ML (ref 2–4.4)
T4 FREE SERPL-MCNC: 1.84 NG/DL (ref 0.92–1.68)
TIBC SERPL-MCNC: 414 MCG/DL (ref 298–536)
TRANSFERRIN SERPL-MCNC: 278 MG/DL (ref 200–360)
TSH SERPL DL<=0.05 MIU/L-ACNC: 1.4 UIU/ML (ref 0.27–4.2)
WBC NRBC COR # BLD AUTO: 11.61 10*3/MM3 (ref 3.4–10.8)

## 2025-05-27 ENCOUNTER — OFFICE VISIT (OUTPATIENT)
Dept: ENDOCRINOLOGY | Facility: CLINIC | Age: 65
End: 2025-05-27
Payer: COMMERCIAL

## 2025-05-27 ENCOUNTER — PATIENT ROUNDING (BHMG ONLY) (OUTPATIENT)
Dept: ENDOCRINOLOGY | Facility: CLINIC | Age: 65
End: 2025-05-27
Payer: COMMERCIAL

## 2025-05-27 ENCOUNTER — TELEPHONE (OUTPATIENT)
Dept: ENDOCRINOLOGY | Facility: CLINIC | Age: 65
End: 2025-05-27
Payer: COMMERCIAL

## 2025-05-27 VITALS
HEIGHT: 62 IN | SYSTOLIC BLOOD PRESSURE: 118 MMHG | WEIGHT: 124 LBS | BODY MASS INDEX: 22.82 KG/M2 | DIASTOLIC BLOOD PRESSURE: 60 MMHG | HEART RATE: 82 BPM | OXYGEN SATURATION: 97 %

## 2025-05-27 DIAGNOSIS — R94.6 ABNORMAL THYROID FUNCTION TEST: Primary | ICD-10-CM

## 2025-05-27 PROCEDURE — 99204 OFFICE O/P NEW MOD 45 MIN: CPT | Performed by: INTERNAL MEDICINE

## 2025-05-27 NOTE — PATIENT INSTRUCTIONS
Please,    - Please hold methimazole and all nutritional supplements for now.  - Plan for blood work on Monday, June 2, 2025.  - Blood work is nonfasting.    Will review the results and further care accordingly.  You can resume your nutritional supplement after getting the blood work done on 6/2/2025 but do not resume methimazole therapy.    Thank you for your visit today.    If you have any questions or concerns please feel free to reach out of the office.

## 2025-05-27 NOTE — PROGRESS NOTES
-----------------------------------------------------------------  ENDOCRINE CLINIC NOTE  -----------------------------------------------------------------        PATIENT NAME: Ela Paulino  PATIENT : 1960 AGE: 65 y.o.  MRN NUMBER: [id number]  PRIMARY CARE: Kelley Parikh DO    ==========================================================================    CHIEF COMPLAINT: Abnormal thyroid function  DATE OF SERVICE: 25    ==========================================================================    HPI / SUBJECTIVE    65 y.o. female is seen in the clinic today for abnormal thyroid function.  Patient had a spinal surgery in May 2024 and was starting to have a flareup of radiculopathy for which patient followed with neurology/neurosurgery and was placed on steroid therapy.  After starting steroid therapy patient started experiencing double vision and watery eyes with some blurring of vision and was seen by ophthalmology and had blood work done concerning for thyroid disorder and patient had blood work which showed elevated free T4 with normal TSH and normal free T4.  Patient started taking methimazole therapy for concerns for hyperthyroidism on 2025.  Patient is also taking over-the-counter supplementation including vitamin C, multivitamins and biotin therapy.  Also on AREDS vitamins for macular degeneration.  Patient has a family history significant for thyroid disorder, maternal grandmother.  Patient's mother also had thyroid disorder, unknown.  Patient overall complaint is of generalized shakiness and tremors.  Also have joint issues as well.  Also have cold intolerance.    ==========================================================================                                                PAST MEDICAL HISTORY    Past Medical History:   Diagnosis Date    Abnormal ECG     Allergic     Codein    Arthritis     Cataract     Surgery    Cholelithiasis     Delayed emergence from anesthesia      Depression     DEXA     = (0.1/0.1); = (0.0/ 0.1)    Hyperthyroidism     Injury of back 25    Low back pain     MAMMO     NEG =     Meniere's Rt ear     Neck pain, acute     PAP     (ABHILASH Exposure) = NEG    Seizures     As a kid    Thoracic disc disorder 25       ==========================================================================    PAST SURGICAL HISTORY    Past Surgical History:   Procedure Laterality Date    CATARACT EXTRACTION, BILATERAL      COLONOSCOPY      = NEG, rech        GSI    DIAGNOSTIC LAPAROSCOPY      for poss Tubal Preg    DILATATION AND CURETTAGE      GANGLION CYST EXCISION Right     wrist    LASIK Bilateral     LUMBAR DISCECTOMY Left 05/15/2024    Procedure: Left lumbar 4 5 far lateral microdiscectomy;  Surgeon: Colin Bonilla IV, MD;  Location: [facility] OR;  Service: Neurosurgery;  Laterality: Left;    VAGINAL DELIVERY       -  X3    WISDOM TOOTH EXTRACTION         ==========================================================================    FAMILY HISTORY    Family History   Problem Relation Age of Onset    Hypertension Mother     Arthritis Mother     Heart disease Mother     COPD Father     Lung cancer Father     No Known Problems Sister     Testicular cancer Brother     Heart disease Maternal Grandmother     Stroke Paternal Grandmother     Dementia Paternal Grandmother     Mental illness Son         Autism       ==========================================================================    SOCIAL HISTORY    Social History     Socioeconomic History    Marital status:    Tobacco Use    Smoking status: Never     Passive exposure: Never    Smokeless tobacco: Never   Vaping Use    Vaping status: Never Used   Substance and Sexual Activity    Alcohol use: Not Currently    Drug use: Never    Sexual activity: Yes     Partners: Male     Birth control/protection: Post-menopausal        ==========================================================================    MEDICATIONS      Current Outpatient Medications:     bimatoprost (Latisse) 0.03 % ophthalmic solution, Apply 1 drop topically to the appropriate area as directed Daily., Disp: , Rfl:     cephalexin (KEFLEX) 500 MG capsule, Take 1 capsule by mouth 4 (Four) Times a Day., Disp: 28 capsule, Rfl: 0    Diclofenac Sodium (VOLTAREN) 1 % gel gel, APPLY 0.5 GRAMS TO THE AFFECTED AREA(S) BY TOPICAL ROUTE 4 TIMES PER DAY, Disp: 100 g, Rfl: 2    ferrous sulfate 325 (65 FE) MG tablet, Take 1 tablet by mouth Daily With Breakfast., Disp: , Rfl:     gabapentin (NEURONTIN) 300 MG capsule, Take 1 capsule by mouth Daily., Disp: 90 capsule, Rfl: 0    ketoconazole (NIZORAL) 2 % cream, Apply twice daily to rash at corners of mouth, Disp: 30 g, Rfl: 5    methIMAzole (TAPAZOLE) 5 MG tablet, Take 1 tablet by mouth Daily., Disp: 30 tablet, Rfl: 1    ==========================================================================    ALLERGIES    Allergies   Allergen Reactions    Codeine Nausea Only    Latex Unknown - High Severity       ==========================================================================    OBJECTIVE    Vitals:    05/27/25 1040   BP: 118/60   Pulse: 82   SpO2: 97%     Body mass index is 22.67 kg/m².     General: Alert, cooperative, no acute distress  Thyroid:  no enlargement/tenderness/palpable nodules  Lungs: Clear to auscultation bilaterally, respirations unlabored  Heart: Regular rate and rhythm, S1 and S2 normal, no murmur, rub or gallop  Abdomen: Soft, NT, ND and Bowel sounds Positive  Extremities:  Extremities normal, atraumatic, no cyanosis or edema    ==========================================================================    LAB EVALUATION    Lab Results   Component Value Date    GLUCOSE 90 11/13/2024    BUN 13 11/13/2024    CREATININE 0.77 11/13/2024    EGFRIFNONA 71 10/22/2021    BCR 16.9 11/13/2024    K 4.0 11/13/2024    CO2 27.0  11/13/2024    CALCIUM 9.7 11/13/2024    ALBUMIN 4.3 11/13/2024    AST 17 11/13/2024    ALT 20 11/13/2024    CHOL 214 (H) 04/22/2024    TRIG 66 04/22/2024    HDL 73 (H) 04/22/2024     (H) 04/22/2024     Lab Results   Component Value Date    HGBA1C 6.10 (H) 05/23/2025    HGBA1C 5.99 (H) 02/26/2025    HGBA1C 5.80 (H) 11/13/2024     Lab Results   Component Value Date    CREATININE 0.77 11/13/2024     Lab Results   Component Value Date    TSH 1.400 05/23/2025    FREET4 1.84 (H) 05/23/2025    THYROIDAB 9 05/14/2025      Latest Reference Range & Units 09/13/22 08:12 10/26/22 14:41 12/05/22 15:39 07/20/23 06:13 02/26/25 07:02 05/14/25 06:09 05/23/25 16:01 05/23/25 16:05   TSH, High Sensitivity 0.3 - 4.2 mIU/L   1.0 (E)        TSH Baseline 0.270 - 4.200 uIU/mL 1.270   1.810 1.390 0.157 (L) 1.400    T4, Total 4.50 - 11.70 mcg/dL      5.99     Free T4 0.92 - 1.68 ng/dL  1.22   1.35  1.84 (H)    T3, Total 80.0 - 200.0 ng/dl      33.5 (L)     T3, Free 2.00 - 4.40 pg/mL  2.76      3.07   Thyroglobulin Ab 0.0 - 0.9 IU/mL      <1.0     Thyroid Peroxidase Antibody 0 - 34 IU/mL      9     (L): Data is abnormally low  (H): Data is abnormally high  (E): External lab result  ==========================================================================    ASSESSMENT AND PLAN    # Abnormal thyroid function  - Reviewed blood work from 2022 after 2025  - Patient last blood work on 5/23/2025 is suggesting elevated free T4 with normal TSH and normal free T3  - Patient also had TPO antibodies checked in May 2025 which was negative  - Patient earlier in February 2025 have normal thyroid function  - Patient is also on over-the-counter supplementation specially biotin which can cause abnormal blood work without actual any underlying thyroid abnormality  - Discussed with patient and will hold methimazole therapy and all supplements for now  - Patient to repeat blood work in a week time after being off methimazole and all the supplements, in  order to improve the overall accuracy of the results will also check free T4 with dialysis  - Discussed with patient in detail about pathophysiology of thyroid gland and thyroid hormone  - Will also check antibody profile including TSI and TRab as well, will follow the results and further care accordingly  - If needed we will also plan for nuclear medicine thyroid uptake scan and ultrasound of thyroid  - This was discussed with patient in detail and answered all question    Thank you for courtesy of consultation.    Return to clinic: 4 weeks time    Entire assessment and plan was discussed and counseled the patient in detail to which patient verbalized understanding and agreed with care.  Answered all queries and concerns.    Part of this note may be an electronic transcription/translation of spoken language to printed text using the Dragon Dictation System.     Note: Portions of this note may have been copied from previous notes but documentation have been reviewed and edited as necessary to support clinical decision making for today's visit.    ==========================================================================    INFORMATION PROVIDED TO PATIENT    Patient Instructions   Please,    - Please hold methimazole and all nutritional supplements for now.  - Plan for blood work on Monday, June 2, 2025.  - Blood work is nonfasting.    Will review the results and further care accordingly.  You can resume your nutritional supplement after getting the blood work done on 6/2/2025 but do not resume methimazole therapy.    Thank you for your visit today.    If you have any questions or concerns please feel free to reach out of the office.       ==========================================================================  Randy Radford MD  Department of Endocrine, Diabetes and Metabolism  [facility], [place]  ==========================================================================

## 2025-05-27 NOTE — PROGRESS NOTES
May 27, 2025    Hello, may I speak with Ela Paulino?    My name is everett      I am  with [facility]  [address] [facility] [facility] [place] [address].    Before we get started may I verify your date of birth? 1960    I am calling to officially welcome you to our practice and ask about your recent visit. Is this a good time to talk? yes    Tell me about your visit with us. What things went well?  went fine        We're always looking for ways to make our patients' experiences even better. Do you have recommendations on ways we may improve?  no    Overall were you satisfied with your first visit to our practice? yes       I appreciate you taking the time to speak with me today. Is there anything else I can do for you? no      Thank you, and have a great day.

## 2025-05-27 NOTE — TELEPHONE ENCOUNTER
Called patient and offered appt today at 10:40 with Dr Radford.  Patient to call back and ask for Julia.

## 2025-05-30 ENCOUNTER — PATIENT ROUNDING (BHMG ONLY) (OUTPATIENT)
Dept: FAMILY MEDICINE CLINIC | Facility: CLINIC | Age: 65
End: 2025-05-30
Payer: COMMERCIAL

## 2025-06-02 ENCOUNTER — LAB (OUTPATIENT)
Dept: LAB | Facility: HOSPITAL | Age: 65
End: 2025-06-02
Payer: COMMERCIAL

## 2025-06-02 DIAGNOSIS — R94.6 ABNORMAL RESULTS OF THYROID FUNCTION STUDIES: Primary | ICD-10-CM

## 2025-06-02 LAB — TSH SERPL DL<=0.05 MIU/L-ACNC: 1.09 UIU/ML (ref 0.27–4.2)

## 2025-06-02 PROCEDURE — 84443 ASSAY THYROID STIM HORMONE: CPT

## 2025-06-02 PROCEDURE — 83520 IMMUNOASSAY QUANT NOS NONAB: CPT

## 2025-06-02 PROCEDURE — 36415 COLL VENOUS BLD VENIPUNCTURE: CPT

## 2025-06-02 PROCEDURE — 84439 ASSAY OF FREE THYROXINE: CPT

## 2025-06-02 PROCEDURE — 84432 ASSAY OF THYROGLOBULIN: CPT

## 2025-06-02 PROCEDURE — 84445 ASSAY OF TSI GLOBULIN: CPT

## 2025-06-04 LAB
TSH RECEP AB SER-ACNC: <1.1 IU/L (ref 0–1.75)
TSI SER-ACNC: <0.1 IU/L (ref 0–0.55)

## 2025-06-08 LAB
T4 FREE SERPL DIALY-MCNC: 1.5 NG/DL
THYROGLOB SERPL-MCNC: 16 NG/ML

## 2025-06-25 ENCOUNTER — OFFICE VISIT (OUTPATIENT)
Dept: ENDOCRINOLOGY | Facility: CLINIC | Age: 65
End: 2025-06-25
Payer: COMMERCIAL

## 2025-06-25 VITALS
OXYGEN SATURATION: 97 % | DIASTOLIC BLOOD PRESSURE: 78 MMHG | SYSTOLIC BLOOD PRESSURE: 124 MMHG | WEIGHT: 129 LBS | HEART RATE: 86 BPM | BODY MASS INDEX: 23.74 KG/M2 | HEIGHT: 62 IN

## 2025-06-25 DIAGNOSIS — R94.6 ABNORMAL THYROID FUNCTION TEST: Primary | ICD-10-CM

## 2025-06-25 PROCEDURE — 99214 OFFICE O/P EST MOD 30 MIN: CPT | Performed by: INTERNAL MEDICINE

## 2025-06-25 NOTE — PATIENT INSTRUCTIONS
Please,    - Your last set of blood work were within normal limit.  - Plan for repeat blood work in 3 months time.  1 week prior to the blood work please stop taking all over-the-counter multivitamins.    If your blood work is within normal limits you can continue to follow-up with your primary care and can be seen in the endocrine clinic as needed.  But if there is any significant finding on your blood work we will plan for follow-up appointment to discuss further care.    Thank you for your visit today.    If you have any questions or concerns please feel free to reach out of the office.

## 2025-06-25 NOTE — PROGRESS NOTES
-----------------------------------------------------------------  ENDOCRINE CLINIC NOTE  -----------------------------------------------------------------        PATIENT NAME: Ela Paulino  PATIENT : 1960 AGE: 65 y.o.  MRN NUMBER: [id number]  PRIMARY CARE: Kelley Parikh DO    ==========================================================================    CHIEF COMPLAINT: Abnormal thyroid function  DATE OF SERVICE: 25    ==========================================================================    HPI / SUBJECTIVE    65 y.o. female is seen in the clinic today for abnormal thyroid function.  Patient had a spinal surgery in May 2024 and patient was starting to have a flareup of radiculopathy for which patient was followed by neurology/neurosurgery and was placed on steroid therapy.  After steroid therapy patient was experiencing double vision and watery eye with some blurring of vision and patient was seen by ophthalmology and had blood work done concerning for thyroid disorder and patient blood work showed elevated free T4 with normal TSH.  Patient was started on methimazole therapy for concerns of hyperthyroidism on 2025 and patient took the medication for 2 days.  Patient the same time was also taking over-the-counter supplementation including vitamin C, multivitamin and biotin therapy.  Patient have a history significant for AREDS and patient is currently taking vitamins for macular degeneration as well.  Patient have a family history significant for thyroid disorder, maternal grandmother.  Patient mother also have a history of thyroid disorder but this is an unknown type.  Patient overall complaint of generalized shakiness and tremors with joint pains.  Also patient have a history of cold intolerance as well.  Blood work was repeated after stopping all multivitamins and methimazole therapy.  Thyroid workup showed normal function additionally antibody profile was also negative.  Patient  continues to have similar symptoms.    ==========================================================================                                                PAST MEDICAL HISTORY    Past Medical History:   Diagnosis Date    Abnormal ECG     Allergic     Codein    Arthritis     Cataract     Surgery    Cholelithiasis     Delayed emergence from anesthesia     Depression     DEXA     = (0.1/0.1); = (0.0/ 0.1)    Hyperthyroidism     Injury of back 25    Low back pain     MAMMO     NEG =     Meniere's Rt ear     Neck pain, acute     PAP     (ABHILASH Exposure) = NEG    Seizures     As a kid    Thoracic disc disorder 25       ==========================================================================    PAST SURGICAL HISTORY    Past Surgical History:   Procedure Laterality Date    CATARACT EXTRACTION, BILATERAL      COLONOSCOPY      = NEG, rech        GSI    DIAGNOSTIC LAPAROSCOPY      for poss Tubal Preg    DILATATION AND CURETTAGE      GANGLION CYST EXCISION Right     wrist    LASIK Bilateral     LUMBAR DISCECTOMY Left 05/15/2024    Procedure: Left lumbar 4 5 far lateral microdiscectomy;  Surgeon: Colin Bonilla IV, MD;  Location: [facility] MA[facility] OR;  Service: Neurosurgery;  Laterality: Left;    VAGINAL DELIVERY       -  X3    WISDOM TOOTH EXTRACTION         ==========================================================================    FAMILY HISTORY    Family History   Problem Relation Age of Onset    Hypertension Mother     Arthritis Mother     Heart disease Mother     COPD Father     Lung cancer Father     No Known Problems Sister     Testicular cancer Brother     Heart disease Maternal Grandmother     Stroke Paternal Grandmother     Dementia Paternal Grandmother     Mental illness Son         Autism       ==========================================================================    SOCIAL HISTORY    Social History     Socioeconomic History    Marital status:     Number of  children: 3    Years of education: 13   Tobacco Use    Smoking status: Never     Passive exposure: Never    Smokeless tobacco: Never   Vaping Use    Vaping status: Never Used   Substance and Sexual Activity    Alcohol use: Not Currently    Drug use: Never    Sexual activity: Yes     Partners: Male     Birth control/protection: Post-menopausal       ==========================================================================    MEDICATIONS      Current Outpatient Medications:     Diclofenac Sodium (VOLTAREN) 1 % gel gel, APPLY 0.5 GRAMS TO THE AFFECTED AREA(S) BY TOPICAL ROUTE 4 TIMES PER DAY, Disp: 100 g, Rfl: 2    gabapentin (NEURONTIN) 300 MG capsule, Take 1 capsule by mouth Daily., Disp: 90 capsule, Rfl: 0    ketoconazole (NIZORAL) 2 % cream, Apply twice daily to rash at corners of mouth, Disp: 30 g, Rfl: 5    ==========================================================================    ALLERGIES    Allergies   Allergen Reactions    Codeine Nausea Only    Latex Unknown - High Severity       ==========================================================================    OBJECTIVE    Vitals:    06/25/25 1509   BP: 124/78   Pulse: 86   SpO2: 97%     Body mass index is 23.59 kg/m².     General: Alert, cooperative, no acute distress  Thyroid:  no enlargement/tenderness/palpable nodules  Lungs: Clear to auscultation bilaterally, respirations unlabored  Heart: Regular rate and rhythm, S1 and S2 normal, no murmur, rub or gallop  Abdomen: Soft, NT, ND and Bowel sounds Positive  Extremities:  Extremities normal, atraumatic, no cyanosis or edema    ==========================================================================    LAB EVALUATION    Lab Results   Component Value Date    GLUCOSE 90 11/13/2024    BUN 13 11/13/2024    CREATININE 0.77 11/13/2024    EGFRIFNONA 71 10/22/2021    BCR 16.9 11/13/2024    K 4.0 11/13/2024    CO2 27.0 11/13/2024    CALCIUM 9.7 11/13/2024    ALBUMIN 4.3 11/13/2024    AST 17 11/13/2024    ALT 20  11/13/2024    CHOL 214 (H) 04/22/2024    TRIG 66 04/22/2024    HDL 73 (H) 04/22/2024     (H) 04/22/2024     Lab Results   Component Value Date    HGBA1C 6.10 (H) 05/23/2025    HGBA1C 5.99 (H) 02/26/2025    HGBA1C 5.80 (H) 11/13/2024     Lab Results   Component Value Date    CREATININE 0.77 11/13/2024     Lab Results   Component Value Date    TSH 1.090 06/02/2025    FREET4 1.84 (H) 05/23/2025    THYROIDAB 9 05/14/2025      Latest Reference Range & Units 10/26/22 14:41 12/05/22 15:39 07/20/23 06:13 02/26/25 07:02 05/14/25 06:09 05/23/25 16:01 05/23/25 16:05 06/02/25 06:35   TSH, High Sensitivity 0.3 - 4.2 mIU/L  1.0 (E)         TSH Baseline 0.270 - 4.200 uIU/mL   1.810 1.390 0.157 (L) 1.400  1.090   T4, Total 4.50 - 11.70 mcg/dL     5.99      Free T4 ng/dL 1.22   1.35  1.84 (H)  1.5   T3, Total 80.0 - 200.0 ng/dl     33.5 (L)      T3, Free 2.00 - 4.40 pg/mL 2.76      3.07    Thyroglobulin ng/mL        16   Thyroglobulin Ab 0.0 - 0.9 IU/mL     <1.0      Thyroid Peroxidase Antibody 0 - 34 IU/mL     9      Thyroid Stimulating Immunoglobulin 0.00 - 0.55 IU/L        <0.10   Thyrotropin Receptor Antibody 0.00 - 1.75 IU/L        <1.10   (L): Data is abnormally low  (H): Data is abnormally high  (E): External lab result    ==========================================================================    ASSESSMENT AND PLAN    # Abnormal thyroid function  - Reviewed blood work from 2022 up till  June 2, 2025  - Patient thyroid function are currently stable, most likely patient had abnormal blood finding due to effects of multivitamins/especially biotin on the blood work  - Patient blood work and antibody profile are all negative  - Patient doing well but family history significant for thyroid disorder therefore we will plan for 1 more set of blood work in 3 months time  - If patient blood work in 3 months is remaining stable patient can continue to follow with primary care but if there is any significant finding on blood  work patient will follow-up in endocrine clinic  - Blood work which will be ordered will be free T4 with dialysis and TSH    Return to clinic: as needed    Entire assessment and plan was discussed and counseled the patient in detail to which patient verbalized understanding and agreed with care.  Answered all queries and concerns.    Part of this note may be an electronic transcription/translation of spoken language to printed text using the Dragon Dictation System.     Note: Portions of this note may have been copied from previous notes but documentation have been reviewed and edited as necessary to support clinical decision making for today's visit.    ==========================================================================    INFORMATION PROVIDED TO PATIENT    Patient Instructions   Please,    - Your last set of blood work were within normal limit.  - Plan for repeat blood work in 3 months time.  1 week prior to the blood work please stop taking all over-the-counter multivitamins.    If your blood work is within normal limits you can continue to follow-up with your primary care and can be seen in the endocrine clinic as needed.  But if there is any significant finding on your blood work we will plan for follow-up appointment to discuss further care.    Thank you for your visit today.    If you have any questions or concerns please feel free to reach out of the office.       ==========================================================================  Randy Radford MD  Department of Endocrine, Diabetes and Metabolism  [facility], [place]  ==========================================================================

## 2025-06-30 RX ORDER — MULTIPLE VITAMINS W/ MINERALS TAB 9MG-400MCG
1 TAB ORAL DAILY
COMMUNITY

## 2025-07-14 ENCOUNTER — ON CAMPUS - OUTPATIENT (AMBULATORY)
Dept: URBAN - METROPOLITAN AREA HOSPITAL 85 | Facility: HOSPITAL | Age: 65
End: 2025-07-14
Payer: COMMERCIAL

## 2025-07-14 ENCOUNTER — HOSPITAL ENCOUNTER (OUTPATIENT)
Facility: HOSPITAL | Age: 65
Setting detail: HOSPITAL OUTPATIENT SURGERY
Discharge: HOME | End: 2025-07-14
Attending: INTERNAL MEDICINE | Admitting: INTERNAL MEDICINE
Payer: COMMERCIAL

## 2025-07-14 ENCOUNTER — ANESTHESIA (OUTPATIENT)
Dept: GASTROENTEROLOGY | Facility: HOSPITAL | Age: 65
End: 2025-07-14
Payer: COMMERCIAL

## 2025-07-14 ENCOUNTER — ANESTHESIA EVENT (OUTPATIENT)
Dept: GASTROENTEROLOGY | Facility: HOSPITAL | Age: 65
End: 2025-07-14
Payer: COMMERCIAL

## 2025-07-14 VITALS
OXYGEN SATURATION: 100 % | HEIGHT: 62 IN | SYSTOLIC BLOOD PRESSURE: 111 MMHG | HEART RATE: 85 BPM | BODY MASS INDEX: 22.72 KG/M2 | DIASTOLIC BLOOD PRESSURE: 69 MMHG | TEMPERATURE: 97.9 F | WEIGHT: 123.46 LBS | RESPIRATION RATE: 12 BRPM

## 2025-07-14 DIAGNOSIS — D12.2 BENIGN NEOPLASM OF ASCENDING COLON: ICD-10-CM

## 2025-07-14 DIAGNOSIS — Z12.11 SCREEN FOR COLON CANCER: ICD-10-CM

## 2025-07-14 DIAGNOSIS — K64.8 OTHER HEMORRHOIDS: ICD-10-CM

## 2025-07-14 DIAGNOSIS — K57.30 DIVERTICULOSIS OF LARGE INTESTINE WITHOUT PERFORATION OR ABS: ICD-10-CM

## 2025-07-14 DIAGNOSIS — Z12.11 ENCOUNTER FOR SCREENING FOR MALIGNANT NEOPLASM OF COLON: ICD-10-CM

## 2025-07-14 PROCEDURE — 88305 TISSUE EXAM BY PATHOLOGIST: CPT | Performed by: INTERNAL MEDICINE

## 2025-07-14 PROCEDURE — 45385 COLONOSCOPY W/LESION REMOVAL: CPT | Mod: PT | Performed by: INTERNAL MEDICINE

## 2025-07-14 PROCEDURE — 25010000002 PROPOFOL 10 MG/ML EMULSION

## 2025-07-14 PROCEDURE — 25010000002 LIDOCAINE PF 2% 2 % SOLUTION

## 2025-07-14 PROCEDURE — 25810000003 SODIUM CHLORIDE 0.9 % SOLUTION: Performed by: ANESTHESIOLOGY

## 2025-07-14 RX ORDER — IPRATROPIUM BROMIDE AND ALBUTEROL SULFATE 2.5; .5 MG/3ML; MG/3ML
3 SOLUTION RESPIRATORY (INHALATION) ONCE AS NEEDED
Status: DISCONTINUED | OUTPATIENT
Start: 2025-07-14 | End: 2025-07-14 | Stop reason: HOSPADM

## 2025-07-14 RX ORDER — ONDANSETRON 2 MG/ML
4 INJECTION INTRAMUSCULAR; INTRAVENOUS ONCE AS NEEDED
Status: DISCONTINUED | OUTPATIENT
Start: 2025-07-14 | End: 2025-07-14 | Stop reason: HOSPADM

## 2025-07-14 RX ORDER — LIDOCAINE HYDROCHLORIDE 20 MG/ML
INJECTION, SOLUTION EPIDURAL; INFILTRATION; INTRACAUDAL; PERINEURAL AS NEEDED
Status: DISCONTINUED | OUTPATIENT
Start: 2025-07-14 | End: 2025-07-14 | Stop reason: SURG

## 2025-07-14 RX ORDER — SODIUM CHLORIDE 9 MG/ML
INJECTION, SOLUTION INTRAVENOUS CONTINUOUS PRN
Status: DISCONTINUED | OUTPATIENT
Start: 2025-07-14 | End: 2025-07-14 | Stop reason: SURG

## 2025-07-14 RX ORDER — EPHEDRINE SULFATE 5 MG/ML
5 INJECTION INTRAVENOUS ONCE AS NEEDED
Status: DISCONTINUED | OUTPATIENT
Start: 2025-07-14 | End: 2025-07-14 | Stop reason: HOSPADM

## 2025-07-14 RX ORDER — SODIUM CHLORIDE 9 MG/ML
30 INJECTION, SOLUTION INTRAVENOUS
Status: COMPLETED | OUTPATIENT
Start: 2025-07-14 | End: 2025-07-14

## 2025-07-14 RX ORDER — DIPHENHYDRAMINE HYDROCHLORIDE 50 MG/ML
12.5 INJECTION, SOLUTION INTRAMUSCULAR; INTRAVENOUS
Status: DISCONTINUED | OUTPATIENT
Start: 2025-07-14 | End: 2025-07-14 | Stop reason: HOSPADM

## 2025-07-14 RX ORDER — PROPOFOL 10 MG/ML
VIAL (ML) INTRAVENOUS AS NEEDED
Status: DISCONTINUED | OUTPATIENT
Start: 2025-07-14 | End: 2025-07-14 | Stop reason: SURG

## 2025-07-14 RX ORDER — LABETALOL HYDROCHLORIDE 5 MG/ML
5 INJECTION, SOLUTION INTRAVENOUS
Status: DISCONTINUED | OUTPATIENT
Start: 2025-07-14 | End: 2025-07-14 | Stop reason: HOSPADM

## 2025-07-14 RX ORDER — HYDRALAZINE HYDROCHLORIDE 20 MG/ML
5 INJECTION INTRAMUSCULAR; INTRAVENOUS
Status: DISCONTINUED | OUTPATIENT
Start: 2025-07-14 | End: 2025-07-14 | Stop reason: HOSPADM

## 2025-07-14 RX ORDER — PHENYLEPHRINE HCL IN 0.9% NACL 1 MG/10 ML
SYRINGE (ML) INTRAVENOUS AS NEEDED
Status: DISCONTINUED | OUTPATIENT
Start: 2025-07-14 | End: 2025-07-14 | Stop reason: SURG

## 2025-07-14 RX ADMIN — Medication 100 MCG: at 09:53

## 2025-07-14 RX ADMIN — SODIUM CHLORIDE 30 ML/HR: 9 INJECTION, SOLUTION INTRAVENOUS at 08:58

## 2025-07-14 RX ADMIN — PROPOFOL 150 MCG/KG/MIN: 10 INJECTION, EMULSION INTRAVENOUS at 09:39

## 2025-07-14 RX ADMIN — Medication 100 MCG: at 09:56

## 2025-07-14 RX ADMIN — LIDOCAINE HYDROCHLORIDE 80 MG: 20 INJECTION, SOLUTION EPIDURAL; INFILTRATION; INTRACAUDAL; PERINEURAL at 09:40

## 2025-07-14 RX ADMIN — SODIUM CHLORIDE: 9 INJECTION, SOLUTION INTRAVENOUS at 09:37

## 2025-07-14 RX ADMIN — PROPOFOL 60 MG: 10 INJECTION, EMULSION INTRAVENOUS at 09:40

## 2025-07-14 NOTE — DISCHARGE INSTRUCTIONS
A responsible adult should stay with you and you should rest quietly for the rest of the day.    Do not drink alcohol, drive, operate any heavy machinery or power tools or make any legal/important decisions for the next 24 hours.     If you begin to experience severe pain, increased shortness of breath, racing heartbeat or a fever above 101 F, seek immediate medical attention.     Follow up with MD as instructed. Call office for results in 3 to 5 days if needed.     For further questions please call Dr. Fernandez's office at [phone]    Recommendations:  Follow up with GI clinic as needed  Follow up with PCP as scheduled  Follow up with biopsy report  Repeat colonoscopy in 5 years with possible family history of colon cancer  Benefiber 1 scoop 2x/day

## 2025-07-14 NOTE — ANESTHESIA PREPROCEDURE EVALUATION
Anesthesia Evaluation     Patient summary reviewed and Nursing notes reviewed   NPO Solid Status: > 8 hours  NPO Liquid Status: > 8 hours           Airway   Mallampati: II  TM distance: >3 FB  Neck ROM: full  No difficulty expected  Dental - normal exam     Pulmonary    Cardiovascular         Neuro/Psych  (+) tremors, numbness, psychiatric history  GI/Hepatic/Renal/Endo    (+) thyroid problem     Musculoskeletal     (+) neck pain  Abdominal    Substance History      OB/GYN          Other   arthritis,     ROS/Med Hx Other: Normal LV size and contractility EF of 60 to 65%  Normal RV size  Normal atrial size  Aortic valve, mitral valve, tricuspid valve appears structurally normal, no significant regurgitation seen.  No pericardial effusion seen.  Proximal aorta appears normal in size.                Anesthesia Plan    ASA 2     MAC and general     intravenous induction     Anesthetic plan, risks, benefits, and alternatives have been provided, discussed and informed consent has been obtained with: patient.    Plan discussed with CRNA.    CODE STATUS:

## 2025-07-14 NOTE — ANESTHESIA POSTPROCEDURE EVALUATION
Patient: Ela Paulino    Procedure Summary       Date: 07/14/25 Room / Location: [facility] ENDOSCOPY 4 / [facility] ENDOSCOPY    Anesthesia Start: 0937 Anesthesia Stop: 1004    Procedure: COLONOSCOPY WITH POLYPECTOMY X 1 (Rectum) Diagnosis:       Screen for colon cancer      (Screen for colon cancer [Z12.11])    Surgeons: George Fernandez MD Provider: Carter Soriano MD    Anesthesia Type: MAC, general ASA Status: 2            Anesthesia Type: MAC, general    Vitals  Vitals Value Taken Time   /69 07/14/25 10:34   Temp     Pulse 91 07/14/25 10:36   Resp 12 07/14/25 10:33   SpO2 97 % 07/14/25 10:36   Vitals shown include unfiled device data.        Post Anesthesia Care and Evaluation    Patient location during evaluation: PACU  Patient participation: complete - patient participated  Level of consciousness: awake  Pain scale: See nurse's notes for pain score.  Pain management: adequate    Airway patency: patent  Anesthetic complications: No anesthetic complications  PONV Status: none  Cardiovascular status: acceptable  Respiratory status: acceptable and spontaneous ventilation  Hydration status: acceptable    Comments: Patient seen and examined postoperatively; vital signs stable; SpO2 greater than or equal to 90%; cardiopulmonary status stable; nausea/vomiting adequately controlled; pain adequately controlled; no apparent anesthesia complications; patient discharged from anesthesia care when discharge criteria were met

## 2025-07-14 NOTE — OP NOTE
COLONOSCOPY Procedure Report    Patient Name:  Ela Paulino  YOB: 1960    Date of Surgery:  7/14/2025     Pre-Op Diagnosis:  Screen for colon cancer [Z12.11]         Procedure/CPT® Codes:  No CPT Code Applied in Case Entry    Procedure(s):  COLONOSCOPY WITH POLYPECTOMY X 1    Staff:  Surgeon(s):  George Fernandez MD      Anesthesia: Monitored Anesthesia Care    Description of Procedure:  A description of the procedure as well as risks, benefits and alternative methods were explained to the patient who voiced understanding and signed the corresponding consent form. A physical exam was performed and vital signs were monitored throughout the procedure.    A rectal exam was performed which was normal. An Olympus colonoscope was placed into the rectum and proceeded under direct visualization through the colon until the cecum and appendiceal orifice were identified. Careful visualization occurred upon slow withdraw of the scope. The scope was then retroflexed and the distal rectum was visualized. The quality of the prep was good. The procedure was not difficult and there were no immediate complications.    Findings:   Prep was fairly well with some residual contents were noticed in the cecum.  There were suctioned.  Terminal ileum cecum ascending transverse descending colon mucosa well-visualized.  1 polyp around 5 to 6 mm removed with a cold snare.  Diverticulosis was noticed on left side of the colon especially sigmoid with a small opening.  Moderate size internal and small external hemorrhoids was noticed.  Patient Toller procedure very well immediate complication were noticed.      Impression:  1.  Ascending colon polyp was snared.  2.  Left-sided diverticulosis.  3.  Moderate-sized internal/external hemorrhoids    Recommendations:  Follow up with GI clinic as needed  Follow up with PCP as scheduled  Follow up with biopsy report  Repeat colonoscopy in 5 years with possible family history of colon  cancer  Benefiber 1 scoop 2x/day       George Fernandez MD     Date: 7/14/2025    Time: 10:00 EDT

## 2025-07-14 NOTE — H&P
Patient Care Team:  Kelley Parikh DO as PCP - General (Family Medicine)  Alice Garcia MD as Consulting Physician (Rheumatology)  Samira Dupree MD as Obstetrician (Obstetrics and Gynecology)  Paula Jacinto OD (Optometry)  Jeremy Carey Jr., MD as Consulting Physician (Dermatology)  Colin Bonilla IV, MD as Surgeon (Neurosurgery)  Nirmal Gutierrez MD as Consulting Physician (Otolaryngology)  George Fernandez MD as Consulting Physician (Gastroenterology)  Feng Vásquez MD as Consulting Physician (Cardiology)      Subjective .     History of present illness:    Ela Paulino is a 65 y.o. female who presents today for Procedure(s):  COLONOSCOPY for the indications listed below.     The updated Patient Profile was reviewed prior to the procedure, in conjunction with the Physical Exam, including medical conditions, surgical procedures, medications, allergies, family history and social history.     Pre-operatively, I reviewed the indication(s) for the procedure, the risks of the procedure [including but not limited to: unexpected bleeding possibly requiring hospitalization and/or unplanned repeat procedures, perforation possibly requiring surgical treatment, missed lesions and complications of sedation/MAC (also explained by anesthesia staff)].     I have evaluated the patient for risks associated with the planned anesthesia and the procedure to be performed and find the patient an acceptable candidate for IV sedation.    Multiple opportunities were provided for any questions or concerns, and all questions were answered satisfactorily before any anesthesia was administered. We will proceed with the planned procedure.      ASSESSMENT/PLAN:  65 years old female for average risk screening colonoscopy      Past Medical History:  Past Medical History:   Diagnosis Date    Abnormal ECG     Allergic     Codein    Anesthesia complication     BP drops    Arthritis     Cataract     Surgery     Cholelithiasis     Delayed emergence from anesthesia     Depression     DEXA     = (0.1/0.1); = (0.0/ 0.1)    Hyperthyroidism     Injury of back 25    Low back pain     MAMMO     NEG =     Meniere's Rt ear     Neck pain, acute     PAP     (ABHILASH Exposure) = NEG    Seizures     As a kid    Thoracic disc disorder 25       Past Surgical History:  Past Surgical History:   Procedure Laterality Date    CATARACT EXTRACTION, BILATERAL      COLONOSCOPY      = NEG, rech        GSI    DIAGNOSTIC LAPAROSCOPY      for poss Tubal Preg    DILATATION AND CURETTAGE      GANGLION CYST EXCISION Right     wrist    LASIK Bilateral     LUMBAR DISCECTOMY Left 05/15/2024    Procedure: Left lumbar 4 5 far lateral microdiscectomy;  Surgeon: Colin Bonilla IV, MD;  Location: [facility] MAIN OR;  Service: Neurosurgery;  Laterality: Left;    VAGINAL DELIVERY       -  X3    WISDOM TOOTH EXTRACTION         Social History:  Social History     Tobacco Use    Smoking status: Never     Passive exposure: Never    Smokeless tobacco: Never   Vaping Use    Vaping status: Never Used   Substance Use Topics    Alcohol use: Not Currently    Drug use: Never       Family History:  Family History   Problem Relation Age of Onset    Hypertension Mother     Arthritis Mother     Heart disease Mother     COPD Father     Lung cancer Father     No Known Problems Sister     Testicular cancer Brother     Heart disease Maternal Grandmother     Stroke Paternal Grandmother     Dementia Paternal Grandmother     Mental illness Son         Autism       Medications:  Medications Prior to Admission   Medication Sig Dispense Refill Last Dose/Taking    Diclofenac Sodium (VOLTAREN) 1 % gel gel APPLY 0.5 GRAMS TO THE AFFECTED AREA(S) BY TOPICAL ROUTE 4 TIMES PER  g 2 2025    gabapentin (NEURONTIN) 300 MG capsule Take 1 capsule by mouth Daily. 90 capsule 0 2025    multivitamin with minerals tablet tablet Take 1 tablet by mouth Daily.    7/7/2025    ketoconazole (NIZORAL) 2 % cream Apply twice daily to rash at corners of mouth 30 g 5        Scheduled Meds:  Continuous Infusions:No current facility-administered medications for this encounter.    PRN Meds:.    ALLERGIES:  Codeine and Latex        Objective     Vital Signs:   Temp:  [97.9 °F (36.6 °C)] 97.9 °F (36.6 °C)  Heart Rate:  [96] 96  Resp:  [17] 17  BP: (105)/(72) 105/72    Physical Exam:      General Appearance:    Awake and alert, in no acute distress   Lungs:     Clear to auscultation bilaterally, respirations regular, even and unlabored    Heart:    Regular rhythm and normal rate, normal S1 and S2, no            murmur, no gallop, no rub   Abdomen:     Normal bowel sounds, soft, non-tender, no rebound or guarding, non-distended, no hepatosplenomegaly        Results Review:   I reviewed the patient's labs and imaging.  Lab Results (last 24 hours)       ** No results found for the last 24 hours. **            Imaging Results (Last 24 Hours)       ** No results found for the last 24 hours. **               I discussed the patients findings and my recommendations with the patient.  George Fernandez MD  07/14/25  09:37 EDT

## 2025-07-15 LAB
LAB AP CASE REPORT: NORMAL
PATH REPORT.FINAL DX SPEC: NORMAL
PATH REPORT.GROSS SPEC: NORMAL

## 2025-07-25 DIAGNOSIS — M79.642 PAIN IN BOTH HANDS: Primary | ICD-10-CM

## 2025-07-25 DIAGNOSIS — M79.641 PAIN IN BOTH HANDS: Primary | ICD-10-CM

## 2025-08-08 ENCOUNTER — PATIENT MESSAGE (OUTPATIENT)
Dept: FAMILY MEDICINE CLINIC | Facility: CLINIC | Age: 65
End: 2025-08-08
Payer: COMMERCIAL

## 2025-08-13 ENCOUNTER — OFFICE VISIT (OUTPATIENT)
Dept: NEUROLOGY | Facility: CLINIC | Age: 65
End: 2025-08-13
Payer: COMMERCIAL

## 2025-08-13 VITALS
SYSTOLIC BLOOD PRESSURE: 120 MMHG | OXYGEN SATURATION: 98 % | WEIGHT: 124 LBS | HEIGHT: 62 IN | BODY MASS INDEX: 22.82 KG/M2 | DIASTOLIC BLOOD PRESSURE: 74 MMHG | HEART RATE: 81 BPM

## 2025-08-13 DIAGNOSIS — G20.A1 PARKINSON'S DISEASE WITHOUT DYSKINESIA OR FLUCTUATING MANIFESTATIONS: ICD-10-CM

## 2025-08-13 DIAGNOSIS — R41.3 MEMORY LOSS: Primary | ICD-10-CM

## 2025-08-26 ENCOUNTER — TRANSCRIBE ORDERS (OUTPATIENT)
Dept: ADMINISTRATIVE | Facility: HOSPITAL | Age: 65
End: 2025-08-26
Payer: COMMERCIAL

## 2025-08-26 ENCOUNTER — LAB (OUTPATIENT)
Dept: LAB | Facility: HOSPITAL | Age: 65
End: 2025-08-26
Payer: COMMERCIAL

## 2025-08-26 DIAGNOSIS — M15.0 PRIMARY GENERALIZED HYPERTROPHIC OSTEOARTHROSIS: ICD-10-CM

## 2025-08-26 DIAGNOSIS — M25.50 PAIN IN JOINT, MULTIPLE SITES: Primary | ICD-10-CM

## 2025-08-26 DIAGNOSIS — M79.641 PAIN IN BOTH HANDS: ICD-10-CM

## 2025-08-26 DIAGNOSIS — M25.50 PAIN IN JOINT, MULTIPLE SITES: ICD-10-CM

## 2025-08-26 DIAGNOSIS — M62.81 MUSCLE WEAKNESS (GENERALIZED): ICD-10-CM

## 2025-08-26 DIAGNOSIS — M79.642 PAIN IN BOTH HANDS: ICD-10-CM

## 2025-08-26 LAB
ALBUMIN SERPL-MCNC: 4.2 G/DL (ref 3.5–5.2)
ALBUMIN/GLOB SERPL: 1.4 G/DL
ALP SERPL-CCNC: 83 U/L (ref 39–117)
ALT SERPL W P-5'-P-CCNC: 27 U/L (ref 1–33)
ANION GAP SERPL CALCULATED.3IONS-SCNC: 10.8 MMOL/L (ref 5–15)
AST SERPL-CCNC: 21 U/L (ref 1–32)
BACTERIA UR QL AUTO: NORMAL /HPF
BASOPHILS # BLD AUTO: 0.05 10*3/MM3 (ref 0–0.2)
BASOPHILS NFR BLD AUTO: 0.7 % (ref 0–1.5)
BILIRUB SERPL-MCNC: 0.3 MG/DL (ref 0–1.2)
BILIRUB UR QL STRIP: NEGATIVE
BUN SERPL-MCNC: 13 MG/DL (ref 8–23)
BUN/CREAT SERPL: 17.8 (ref 7–25)
C3 SERPL-MCNC: 150 MG/DL (ref 82–167)
C4 SERPL-MCNC: 30 MG/DL (ref 14–44)
CALCIUM SPEC-SCNC: 9.7 MG/DL (ref 8.6–10.5)
CHLORIDE SERPL-SCNC: 106 MMOL/L (ref 98–107)
CHROMATIN AB SERPL-ACNC: <10 IU/ML (ref 0–14)
CK SERPL-CCNC: 94 U/L (ref 20–180)
CLARITY UR: CLEAR
CO2 SERPL-SCNC: 23.2 MMOL/L (ref 22–29)
COLOR UR: YELLOW
CREAT SERPL-MCNC: 0.73 MG/DL (ref 0.57–1)
CRP SERPL-MCNC: <0.3 MG/DL (ref 0–0.5)
DEPRECATED RDW RBC AUTO: 46.3 FL (ref 37–54)
EGFRCR SERPLBLD CKD-EPI 2021: 92 ML/MIN/1.73
EOSINOPHIL # BLD AUTO: 0.12 10*3/MM3 (ref 0–0.4)
EOSINOPHIL NFR BLD AUTO: 1.7 % (ref 0.3–6.2)
ERYTHROCYTE [DISTWIDTH] IN BLOOD BY AUTOMATED COUNT: 14.1 % (ref 12.3–15.4)
ERYTHROCYTE [SEDIMENTATION RATE] IN BLOOD: 18 MM/HR (ref 0–30)
GLOBULIN UR ELPH-MCNC: 3.1 GM/DL
GLUCOSE SERPL-MCNC: 95 MG/DL (ref 65–99)
GLUCOSE UR STRIP-MCNC: NEGATIVE MG/DL
HBV SURFACE AB SER RIA-ACNC: NORMAL
HBV SURFACE AG SERPL QL IA: NORMAL
HCT VFR BLD AUTO: 42.5 % (ref 34–46.6)
HCV AB SER QL: NORMAL
HGB BLD-MCNC: 13.1 G/DL (ref 12–15.9)
HGB UR QL STRIP.AUTO: NEGATIVE
HOLD SPECIMEN: NORMAL
HYALINE CASTS UR QL AUTO: NORMAL /LPF
IMM GRANULOCYTES # BLD AUTO: 0.03 10*3/MM3 (ref 0–0.05)
IMM GRANULOCYTES NFR BLD AUTO: 0.4 % (ref 0–0.5)
KETONES UR QL STRIP: ABNORMAL
LEUKOCYTE ESTERASE UR QL STRIP.AUTO: ABNORMAL
LYMPHOCYTES # BLD AUTO: 2.07 10*3/MM3 (ref 0.7–3.1)
LYMPHOCYTES NFR BLD AUTO: 29.8 % (ref 19.6–45.3)
MCH RBC QN AUTO: 27.7 PG (ref 26.6–33)
MCHC RBC AUTO-ENTMCNC: 30.8 G/DL (ref 31.5–35.7)
MCV RBC AUTO: 89.9 FL (ref 79–97)
MONOCYTES # BLD AUTO: 0.57 10*3/MM3 (ref 0.1–0.9)
MONOCYTES NFR BLD AUTO: 8.2 % (ref 5–12)
NEUTROPHILS NFR BLD AUTO: 4.1 10*3/MM3 (ref 1.7–7)
NEUTROPHILS NFR BLD AUTO: 59.2 % (ref 42.7–76)
NITRITE UR QL STRIP: NEGATIVE
NRBC BLD AUTO-RTO: 0 /100 WBC (ref 0–0.2)
PH UR STRIP.AUTO: 5.5 [PH] (ref 5–8)
PLATELET # BLD AUTO: 361 10*3/MM3 (ref 140–450)
PMV BLD AUTO: 8.4 FL (ref 6–12)
POTASSIUM SERPL-SCNC: 4.3 MMOL/L (ref 3.5–5.2)
PROT SERPL-MCNC: 7.3 G/DL (ref 6–8.5)
PROT UR QL STRIP: NEGATIVE
RBC # BLD AUTO: 4.73 10*6/MM3 (ref 3.77–5.28)
RBC # UR STRIP: NORMAL /HPF
REF LAB TEST METHOD: NORMAL
SODIUM SERPL-SCNC: 140 MMOL/L (ref 136–145)
SP GR UR STRIP: 1.02 (ref 1–1.03)
SQUAMOUS #/AREA URNS HPF: NORMAL /HPF
UROBILINOGEN UR QL STRIP: ABNORMAL
WBC # UR STRIP: NORMAL /HPF
WBC NRBC COR # BLD AUTO: 6.94 10*3/MM3 (ref 3.4–10.8)

## 2025-08-26 PROCEDURE — 86235 NUCLEAR ANTIGEN ANTIBODY: CPT

## 2025-08-26 PROCEDURE — 83516 IMMUNOASSAY NONANTIBODY: CPT

## 2025-08-26 PROCEDURE — 81001 URINALYSIS AUTO W/SCOPE: CPT

## 2025-08-26 PROCEDURE — 86706 HEP B SURFACE ANTIBODY: CPT

## 2025-08-26 PROCEDURE — 86140 C-REACTIVE PROTEIN: CPT

## 2025-08-26 PROCEDURE — 36415 COLL VENOUS BLD VENIPUNCTURE: CPT

## 2025-08-26 PROCEDURE — 82550 ASSAY OF CK (CPK): CPT

## 2025-08-26 PROCEDURE — 87340 HEPATITIS B SURFACE AG IA: CPT

## 2025-08-26 PROCEDURE — 86704 HEP B CORE ANTIBODY TOTAL: CPT

## 2025-08-26 PROCEDURE — 86803 HEPATITIS C AB TEST: CPT

## 2025-08-26 PROCEDURE — 85613 RUSSELL VIPER VENOM DILUTED: CPT

## 2025-08-26 PROCEDURE — 86431 RHEUMATOID FACTOR QUANT: CPT

## 2025-08-26 PROCEDURE — 86147 CARDIOLIPIN ANTIBODY EA IG: CPT

## 2025-08-26 PROCEDURE — 86225 DNA ANTIBODY NATIVE: CPT

## 2025-08-26 PROCEDURE — 86200 CCP ANTIBODY: CPT

## 2025-08-26 PROCEDURE — 85652 RBC SED RATE AUTOMATED: CPT

## 2025-08-26 PROCEDURE — 85732 THROMBOPLASTIN TIME PARTIAL: CPT

## 2025-08-26 PROCEDURE — 86160 COMPLEMENT ANTIGEN: CPT

## 2025-08-26 PROCEDURE — 86038 ANTINUCLEAR ANTIBODIES: CPT

## 2025-08-26 PROCEDURE — 80053 COMPREHEN METABOLIC PANEL: CPT

## 2025-08-26 PROCEDURE — 85025 COMPLETE CBC W/AUTO DIFF WBC: CPT

## 2025-08-26 PROCEDURE — 82085 ASSAY OF ALDOLASE: CPT

## 2025-08-26 PROCEDURE — 86146 BETA-2 GLYCOPROTEIN ANTIBODY: CPT

## 2025-08-27 LAB
ALDOLASE SERPL-CCNC: 17.7 U/L (ref 3.3–10.3)
ANA SER QL IF: POSITIVE
ANA SPECKLED TITR SER: ABNORMAL {TITER}
B2 GLYCOPROT1 IGA SER-ACNC: <9 GPI IGA UNITS (ref 0–25)
B2 GLYCOPROT1 IGG SER-ACNC: <9 GPI IGG UNITS (ref 0–20)
B2 GLYCOPROT1 IGM SER-ACNC: <9 GPI IGM UNITS (ref 0–32)
CARDIOLIPIN IGA SER IA-ACNC: <9 APL U/ML (ref 0–11)
CARDIOLIPIN IGG SER IA-ACNC: <9 GPL U/ML (ref 0–14)
CARDIOLIPIN IGM SER IA-ACNC: <9 MPL U/ML (ref 0–12)
CCP IGA+IGG SERPL IA-ACNC: 3 UNITS (ref 0–19)
CENTROMERE B AB SER-ACNC: <0.2 AI (ref 0–0.9)
DSDNA AB SER-ACNC: <1 IU/ML (ref 0–9)
ENA SM AB SER-ACNC: <0.2 AI (ref 0–0.9)
ENA SS-A AB SER-ACNC: 0.3 AI (ref 0–0.9)
ENA SS-B AB SER-ACNC: <0.2 AI (ref 0–0.9)
ENA SS-B AB SER-ACNC: <0.2 AI (ref 0–0.9)
HBV CORE AB SERPL QL IA: NEGATIVE
Lab: ABNORMAL

## 2025-08-28 ENCOUNTER — LAB (OUTPATIENT)
Dept: LAB | Facility: HOSPITAL | Age: 65
End: 2025-08-28
Payer: COMMERCIAL

## 2025-08-28 DIAGNOSIS — M79.641 PAIN IN BOTH HANDS: ICD-10-CM

## 2025-08-28 DIAGNOSIS — M62.81 MUSCLE WEAKNESS (GENERALIZED): ICD-10-CM

## 2025-08-28 DIAGNOSIS — M25.50 PAIN IN JOINT, MULTIPLE SITES: ICD-10-CM

## 2025-08-28 DIAGNOSIS — M79.642 PAIN IN BOTH HANDS: ICD-10-CM

## 2025-08-28 DIAGNOSIS — M15.0 PRIMARY GENERALIZED HYPERTROPHIC OSTEOARTHROSIS: ICD-10-CM

## 2025-08-28 LAB
LA 2 SCREEN W REFLEX-IMP: NORMAL
SCREEN APTT: 33.9 SEC (ref 0–43.5)
SCREEN DRVVT: 34.4 SEC (ref 0–47)

## 2025-08-28 PROCEDURE — 36415 COLL VENOUS BLD VENIPUNCTURE: CPT

## 2025-08-28 PROCEDURE — 86235 NUCLEAR ANTIGEN ANTIBODY: CPT

## 2025-08-29 ENCOUNTER — HOSPITAL ENCOUNTER (OUTPATIENT)
Dept: GENERAL RADIOLOGY | Facility: HOSPITAL | Age: 65
Discharge: HOME | End: 2025-08-29
Admitting: NURSE PRACTITIONER
Payer: COMMERCIAL

## 2025-08-29 DIAGNOSIS — M15.0 PRIMARY GENERALIZED HYPERTROPHIC OSTEOARTHROSIS: ICD-10-CM

## 2025-08-29 DIAGNOSIS — M62.81 MUSCLE WEAKNESS (GENERALIZED): ICD-10-CM

## 2025-08-29 DIAGNOSIS — M79.641 PAIN IN BOTH HANDS: ICD-10-CM

## 2025-08-29 DIAGNOSIS — M25.50 PAIN IN JOINT, MULTIPLE SITES: ICD-10-CM

## 2025-08-29 DIAGNOSIS — M79.642 PAIN IN BOTH HANDS: ICD-10-CM

## 2025-08-29 LAB
ENA RNP AB SER-ACNC: 0.3 AI (ref 0–0.9)
ENA SM AB SER-ACNC: <0.2 AI (ref 0–0.9)
PM-SCL75 AB SER LINE BLOT-ACNC: <20 UNITS

## 2025-08-29 PROCEDURE — 73130 X-RAY EXAM OF HAND: CPT

## (undated) DEVICE — DRP OPMI 326071

## (undated) DEVICE — UNDERGLV SURG BIOGEL/PI PF SYNTH SURG SZ8.5 BLU 50/BX

## (undated) DEVICE — SNAR POLYP HOTSNARE/BRAIDED OVL/MINI 7F 2.8X10MM 230CM 1P/U

## (undated) DEVICE — SOLUTION,WATER,IRRIGATION,1000ML,STERILE: Brand: MEDLINE

## (undated) DEVICE — DRAPE,TOWEL,LARGE,INVISISHIELD: Brand: MEDLINE

## (undated) DEVICE — SPONGE,NEURO,1"X1",XR,STRL,LF,10/PK: Brand: MEDLINE

## (undated) DEVICE — EXOFIN PRECISION PEN HIGH VISCOSITY TOPICAL SKIN ADHESIVE: Brand: EXOFIN PRECISION PEN, 1G

## (undated) DEVICE — DRP C/ARMOR

## (undated) DEVICE — QUICK CATCH IN-LINE SUCTION POLYP TRAP IS USED FOR SUCTION RETRIEVAL OF ENDOSCOPICALLY REMOVED POLYPS.

## (undated) DEVICE — KT SPINE SURG TOP W/PRONEVIEW

## (undated) DEVICE — KT SURG TURNOVER 050

## (undated) DEVICE — DRSNG WND BORDR/ADHS NONADHR/GZ LF 4X4IN STRL

## (undated) DEVICE — PK BASIC SPINE 50

## (undated) DEVICE — 6.0MM PRECISION ROUND

## (undated) DEVICE — Device

## (undated) DEVICE — GLV SURG BIOGEL LTX PF 8

## (undated) DEVICE — ELECTRD BLD EZ CLN MOD 6.5IN

## (undated) DEVICE — DECANTER: Brand: UNBRANDED

## (undated) DEVICE — PROB SCRW STIM PEDCL DISP

## (undated) DEVICE — PRT MIN/ACC MARS PEEK STR 22X60MM 1P/U

## (undated) DEVICE — KENDALL SCD EXPRESS FOOT CUFF, MEDIUM: Brand: KENDALL SCD

## (undated) DEVICE — SHEET, DRAPE, SPLIT, STERILE: Brand: MEDLINE

## (undated) DEVICE — PK ENDO GI 50

## (undated) DEVICE — TUBING, SUCTION, 1/4" X 12', STRAIGHT: Brand: MEDLINE

## (undated) DEVICE — SMOKE EVACUATION TUBING WITH 7/8 IN TO 1/4 IN REDUCER: Brand: BUFFALO FILTER

## (undated) DEVICE — PAD, GROUNDING, UNIVERSAL, SPLIT, 9': Brand: MEDLINE